# Patient Record
Sex: MALE | Race: WHITE | NOT HISPANIC OR LATINO | Employment: FULL TIME | URBAN - METROPOLITAN AREA
[De-identification: names, ages, dates, MRNs, and addresses within clinical notes are randomized per-mention and may not be internally consistent; named-entity substitution may affect disease eponyms.]

---

## 2017-07-20 ENCOUNTER — CLINICAL SUPPORT (OUTPATIENT)
Dept: OCCUPATIONAL MEDICINE | Facility: CLINIC | Age: 52
End: 2017-07-20

## 2017-07-20 VITALS
OXYGEN SATURATION: 98 % | SYSTOLIC BLOOD PRESSURE: 110 MMHG | DIASTOLIC BLOOD PRESSURE: 74 MMHG | WEIGHT: 265 LBS | HEIGHT: 73 IN | HEART RATE: 52 BPM | BODY MASS INDEX: 35.12 KG/M2 | TEMPERATURE: 98 F

## 2017-07-20 DIAGNOSIS — Z02.89 ENCOUNTER FOR PHYSICAL EXAMINATION RELATED TO EMPLOYMENT: ICD-10-CM

## 2017-07-20 LAB
BILIRUB UR QL STRIP: NORMAL
GLUCOSE UR QL STRIP: NORMAL
KETONES UR QL STRIP: NORMAL
LEUKOCYTE ESTERASE UR QL STRIP: NORMAL
PH, POC UA: NORMAL (ref 5–8)
POC BLOOD, URINE: NORMAL
POC NITRATES, URINE: NORMAL
PROT UR QL STRIP: NORMAL
SP GR UR STRIP: NORMAL (ref 1–1.03)
UROBILINOGEN UR STRIP-ACNC: NORMAL (ref 0.3–2.2)

## 2017-07-20 PROCEDURE — 90714 TD VACC NO PRESV 7 YRS+ IM: CPT | Mod: S$GLB,,,

## 2017-07-20 PROCEDURE — 90691 TYPHOID VACCINE IM: CPT | Mod: S$GLB,,,

## 2017-07-20 PROCEDURE — 99499 UNLISTED E&M SERVICE: CPT | Mod: S$GLB,,, | Performed by: PREVENTIVE MEDICINE

## 2018-02-12 ENCOUNTER — CLINICAL SUPPORT (OUTPATIENT)
Dept: OCCUPATIONAL MEDICINE | Facility: CLINIC | Age: 53
End: 2018-02-12

## 2018-02-12 DIAGNOSIS — Z00.00 PHYSICAL EXAM: Primary | ICD-10-CM

## 2018-02-12 PROCEDURE — 99000 SPECIMEN HANDLING OFFICE-LAB: CPT | Mod: S$GLB,,, | Performed by: PREVENTIVE MEDICINE

## 2018-02-12 PROCEDURE — 99499 UNLISTED E&M SERVICE: CPT | Mod: S$GLB,,, | Performed by: PREVENTIVE MEDICINE

## 2018-02-12 PROCEDURE — 92552 PURE TONE AUDIOMETRY AIR: CPT | Mod: S$GLB,,, | Performed by: PREVENTIVE MEDICINE

## 2018-02-12 PROCEDURE — 99172 OCULAR FUNCTION SCREEN: CPT | Mod: S$GLB,,, | Performed by: PREVENTIVE MEDICINE

## 2018-02-12 PROCEDURE — 36415 COLL VENOUS BLD VENIPUNCTURE: CPT | Mod: S$GLB,,, | Performed by: PREVENTIVE MEDICINE

## 2018-02-12 PROCEDURE — 93000 ELECTROCARDIOGRAM COMPLETE: CPT | Mod: S$GLB,,, | Performed by: PREVENTIVE MEDICINE

## 2018-09-24 ENCOUNTER — CLINICAL SUPPORT (OUTPATIENT)
Dept: URGENT CARE | Facility: CLINIC | Age: 53
End: 2018-09-24

## 2018-09-24 DIAGNOSIS — Z00.00 PHYSICAL EXAM: Primary | ICD-10-CM

## 2018-09-24 PROCEDURE — 99499 UNLISTED E&M SERVICE: CPT | Mod: S$GLB,,, | Performed by: PREVENTIVE MEDICINE

## 2018-09-24 PROCEDURE — 92552 PURE TONE AUDIOMETRY AIR: CPT | Mod: S$GLB,,, | Performed by: PREVENTIVE MEDICINE

## 2021-03-01 LAB
CHOL/HDLC RATIO: 5.2
CHOLEST SERPL-MSCNC: 258 MG/DL (ref 0–200)
HBA1C MFR BLD: 6.2 % (ref 4–6)
HDLC SERPL-MCNC: 50 MG/DL
LDLC SERPL CALC-MCNC: 183 MG/DL
NON-HDL CHOLESTEROL: 208
TRIGLYCERIDE (LIPID PAN): 121

## 2021-04-08 ENCOUNTER — OFFICE VISIT (OUTPATIENT)
Dept: FAMILY MEDICINE | Facility: CLINIC | Age: 56
End: 2021-04-08
Payer: COMMERCIAL

## 2021-04-08 VITALS
HEART RATE: 63 BPM | RESPIRATION RATE: 18 BRPM | WEIGHT: 257.25 LBS | SYSTOLIC BLOOD PRESSURE: 150 MMHG | TEMPERATURE: 97 F | HEIGHT: 73 IN | OXYGEN SATURATION: 98 % | DIASTOLIC BLOOD PRESSURE: 84 MMHG | BODY MASS INDEX: 34.09 KG/M2

## 2021-04-08 DIAGNOSIS — K21.9 GASTROESOPHAGEAL REFLUX DISEASE WITHOUT ESOPHAGITIS: ICD-10-CM

## 2021-04-08 DIAGNOSIS — E78.2 MIXED HYPERLIPIDEMIA: ICD-10-CM

## 2021-04-08 DIAGNOSIS — R06.09 DYSPNEA ON EXERTION: ICD-10-CM

## 2021-04-08 DIAGNOSIS — R55 NEAR SYNCOPE: ICD-10-CM

## 2021-04-08 DIAGNOSIS — E11.9 DIABETES MELLITUS WITHOUT COMPLICATION: Primary | ICD-10-CM

## 2021-04-08 DIAGNOSIS — G47.33 OSA (OBSTRUCTIVE SLEEP APNEA): ICD-10-CM

## 2021-04-08 DIAGNOSIS — R07.9 CHEST PAIN, UNSPECIFIED TYPE: ICD-10-CM

## 2021-04-08 PROCEDURE — 3008F PR BODY MASS INDEX (BMI) DOCUMENTED: ICD-10-PCS | Mod: CPTII,S$GLB,, | Performed by: INTERNAL MEDICINE

## 2021-04-08 PROCEDURE — 1126F AMNT PAIN NOTED NONE PRSNT: CPT | Mod: S$GLB,,, | Performed by: INTERNAL MEDICINE

## 2021-04-08 PROCEDURE — 99204 PR OFFICE/OUTPT VISIT, NEW, LEVL IV, 45-59 MIN: ICD-10-PCS | Mod: S$GLB,,, | Performed by: INTERNAL MEDICINE

## 2021-04-08 PROCEDURE — 3008F BODY MASS INDEX DOCD: CPT | Mod: CPTII,S$GLB,, | Performed by: INTERNAL MEDICINE

## 2021-04-08 PROCEDURE — 99999 PR PBB SHADOW E&M-EST. PATIENT-LVL III: CPT | Mod: PBBFAC,,, | Performed by: INTERNAL MEDICINE

## 2021-04-08 PROCEDURE — 1126F PR PAIN SEVERITY QUANTIFIED, NO PAIN PRESENT: ICD-10-PCS | Mod: S$GLB,,, | Performed by: INTERNAL MEDICINE

## 2021-04-08 PROCEDURE — 99204 OFFICE O/P NEW MOD 45 MIN: CPT | Mod: S$GLB,,, | Performed by: INTERNAL MEDICINE

## 2021-04-08 PROCEDURE — 99999 PR PBB SHADOW E&M-EST. PATIENT-LVL III: ICD-10-PCS | Mod: PBBFAC,,, | Performed by: INTERNAL MEDICINE

## 2021-04-08 RX ORDER — OMEPRAZOLE 40 MG/1
40 CAPSULE, DELAYED RELEASE ORAL DAILY
COMMUNITY
End: 2022-02-21 | Stop reason: ALTCHOICE

## 2021-04-08 RX ORDER — ROSUVASTATIN CALCIUM 20 MG/1
20 TABLET, COATED ORAL DAILY
Qty: 90 TABLET | Refills: 3 | Status: SHIPPED | OUTPATIENT
Start: 2021-04-08 | End: 2022-01-13

## 2021-04-09 ENCOUNTER — PATIENT OUTREACH (OUTPATIENT)
Dept: ADMINISTRATIVE | Facility: HOSPITAL | Age: 56
End: 2021-04-09

## 2021-04-09 ENCOUNTER — PATIENT MESSAGE (OUTPATIENT)
Dept: FAMILY MEDICINE | Facility: CLINIC | Age: 56
End: 2021-04-09

## 2021-04-09 ENCOUNTER — TELEPHONE (OUTPATIENT)
Dept: ADMINISTRATIVE | Facility: HOSPITAL | Age: 56
End: 2021-04-09

## 2021-04-13 ENCOUNTER — HOSPITAL ENCOUNTER (OUTPATIENT)
Dept: CARDIOLOGY | Facility: HOSPITAL | Age: 56
Discharge: HOME | End: 2021-04-13
Attending: INTERNAL MEDICINE
Payer: COMMERCIAL

## 2021-04-13 DIAGNOSIS — R06.09 DYSPNEA ON EXERTION: ICD-10-CM

## 2021-04-13 DIAGNOSIS — R07.9 CHEST PAIN, UNSPECIFIED TYPE: ICD-10-CM

## 2021-04-13 LAB
CV STRESS BASE HR: 68 BPM
DIASTOLIC BLOOD PRESSURE: 96 MMHG
OHS CV CPX 1 MINUTE RECOVERY HEART RATE: 141 BPM
OHS CV CPX 85 PERCENT MAX PREDICTED HEART RATE MALE: 140
OHS CV CPX ESTIMATED METS: 10
OHS CV CPX MAX PREDICTED HEART RATE: 165
OHS CV CPX PATIENT IS FEMALE: 0
OHS CV CPX PATIENT IS MALE: 1
OHS CV CPX PEAK DIASTOLIC BLOOD PRESSURE: 82 MMHG
OHS CV CPX PEAK HEAR RATE: 169 BPM
OHS CV CPX PEAK RATE PRESSURE PRODUCT: NORMAL
OHS CV CPX PEAK SYSTOLIC BLOOD PRESSURE: 235 MMHG
OHS CV CPX PERCENT MAX PREDICTED HEART RATE ACHIEVED: 102
OHS CV CPX RATE PRESSURE PRODUCT PRESENTING: NORMAL
STRESS ECHO POST EXERCISE DUR MIN: 7 MINUTES
STRESS ECHO POST EXERCISE DUR SEC: 58 SECONDS
SYSTOLIC BLOOD PRESSURE: 152 MMHG

## 2021-04-13 PROCEDURE — 93018 CV STRESS TEST I&R ONLY: CPT | Mod: ,,, | Performed by: INTERNAL MEDICINE

## 2021-04-13 PROCEDURE — 93018 EXERCISE STRESS - EKG (CUPID ONLY): ICD-10-PCS | Mod: ,,, | Performed by: INTERNAL MEDICINE

## 2021-04-13 PROCEDURE — 93016 EXERCISE STRESS - EKG (CUPID ONLY): ICD-10-PCS | Mod: ,,, | Performed by: INTERNAL MEDICINE

## 2021-04-13 PROCEDURE — 93017 CV STRESS TEST TRACING ONLY: CPT

## 2021-04-13 PROCEDURE — 93016 CV STRESS TEST SUPVJ ONLY: CPT | Mod: ,,, | Performed by: INTERNAL MEDICINE

## 2021-04-14 ENCOUNTER — TELEPHONE (OUTPATIENT)
Dept: FAMILY MEDICINE | Facility: CLINIC | Age: 56
End: 2021-04-14

## 2021-04-20 ENCOUNTER — TELEPHONE (OUTPATIENT)
Dept: FAMILY MEDICINE | Facility: CLINIC | Age: 56
End: 2021-04-20

## 2021-04-20 DIAGNOSIS — M25.569 KNEE PAIN, UNSPECIFIED CHRONICITY, UNSPECIFIED LATERALITY: Primary | ICD-10-CM

## 2021-04-26 DIAGNOSIS — M25.562 LEFT KNEE PAIN, UNSPECIFIED CHRONICITY: Primary | ICD-10-CM

## 2021-04-26 DIAGNOSIS — M25.561 RIGHT KNEE PAIN, UNSPECIFIED CHRONICITY: ICD-10-CM

## 2021-04-27 ENCOUNTER — OFFICE VISIT (OUTPATIENT)
Dept: ORTHOPEDICS | Facility: CLINIC | Age: 56
End: 2021-04-27
Payer: COMMERCIAL

## 2021-04-27 ENCOUNTER — HOSPITAL ENCOUNTER (OUTPATIENT)
Dept: RADIOLOGY | Facility: HOSPITAL | Age: 56
Discharge: HOME | End: 2021-04-27
Attending: ORTHOPAEDIC SURGERY
Payer: COMMERCIAL

## 2021-04-27 VITALS
SYSTOLIC BLOOD PRESSURE: 173 MMHG | HEIGHT: 73 IN | BODY MASS INDEX: 34.65 KG/M2 | WEIGHT: 261.44 LBS | DIASTOLIC BLOOD PRESSURE: 93 MMHG | HEART RATE: 71 BPM

## 2021-04-27 DIAGNOSIS — M17.11 PRIMARY OSTEOARTHRITIS OF RIGHT KNEE: Primary | ICD-10-CM

## 2021-04-27 DIAGNOSIS — M25.562 LEFT KNEE PAIN, UNSPECIFIED CHRONICITY: ICD-10-CM

## 2021-04-27 DIAGNOSIS — M17.12 PRIMARY OSTEOARTHRITIS OF LEFT KNEE: ICD-10-CM

## 2021-04-27 DIAGNOSIS — M25.569 KNEE PAIN, UNSPECIFIED CHRONICITY, UNSPECIFIED LATERALITY: ICD-10-CM

## 2021-04-27 PROCEDURE — 73564 XR KNEE COMP 4 OR MORE VIEWS BILAT: ICD-10-PCS | Mod: 26,50,, | Performed by: RADIOLOGY

## 2021-04-27 PROCEDURE — 3008F PR BODY MASS INDEX (BMI) DOCUMENTED: ICD-10-PCS | Mod: CPTII,S$GLB,, | Performed by: ORTHOPAEDIC SURGERY

## 2021-04-27 PROCEDURE — 99214 PR OFFICE/OUTPT VISIT, EST, LEVL IV, 30-39 MIN: ICD-10-PCS | Mod: S$GLB,,, | Performed by: ORTHOPAEDIC SURGERY

## 2021-04-27 PROCEDURE — 1125F PR PAIN SEVERITY QUANTIFIED, PAIN PRESENT: ICD-10-PCS | Mod: S$GLB,,, | Performed by: ORTHOPAEDIC SURGERY

## 2021-04-27 PROCEDURE — 73564 X-RAY EXAM KNEE 4 OR MORE: CPT | Mod: TC,50,FY

## 2021-04-27 PROCEDURE — 99214 OFFICE O/P EST MOD 30 MIN: CPT | Mod: S$GLB,,, | Performed by: ORTHOPAEDIC SURGERY

## 2021-04-27 PROCEDURE — 1125F AMNT PAIN NOTED PAIN PRSNT: CPT | Mod: S$GLB,,, | Performed by: ORTHOPAEDIC SURGERY

## 2021-04-27 PROCEDURE — 99999 PR PBB SHADOW E&M-EST. PATIENT-LVL III: ICD-10-PCS | Mod: PBBFAC,,, | Performed by: ORTHOPAEDIC SURGERY

## 2021-04-27 PROCEDURE — 73564 X-RAY EXAM KNEE 4 OR MORE: CPT | Mod: 26,50,, | Performed by: RADIOLOGY

## 2021-04-27 PROCEDURE — 99999 PR PBB SHADOW E&M-EST. PATIENT-LVL III: CPT | Mod: PBBFAC,,, | Performed by: ORTHOPAEDIC SURGERY

## 2021-04-27 PROCEDURE — 3008F BODY MASS INDEX DOCD: CPT | Mod: CPTII,S$GLB,, | Performed by: ORTHOPAEDIC SURGERY

## 2021-04-27 RX ORDER — EZETIMIBE 10 MG/1
TABLET ORAL
COMMUNITY
End: 2022-04-18

## 2021-04-27 RX ORDER — DICLOFENAC SODIUM 75 MG/1
75 TABLET, DELAYED RELEASE ORAL 2 TIMES DAILY
Qty: 28 TABLET | Refills: 0 | Status: SHIPPED | OUTPATIENT
Start: 2021-04-27 | End: 2021-05-11

## 2021-05-05 ENCOUNTER — PATIENT MESSAGE (OUTPATIENT)
Dept: FAMILY MEDICINE | Facility: CLINIC | Age: 56
End: 2021-05-05

## 2021-06-01 DIAGNOSIS — M17.12 PRIMARY OSTEOARTHRITIS OF LEFT KNEE: ICD-10-CM

## 2021-06-01 DIAGNOSIS — M17.11 PRIMARY OSTEOARTHRITIS OF RIGHT KNEE: Primary | ICD-10-CM

## 2021-07-01 ENCOUNTER — TELEPHONE (OUTPATIENT)
Dept: ADMINISTRATIVE | Facility: HOSPITAL | Age: 56
End: 2021-07-01

## 2021-07-01 ENCOUNTER — PATIENT OUTREACH (OUTPATIENT)
Dept: ADMINISTRATIVE | Facility: HOSPITAL | Age: 56
End: 2021-07-01

## 2022-01-07 ENCOUNTER — PATIENT MESSAGE (OUTPATIENT)
Dept: INTERNAL MEDICINE | Facility: CLINIC | Age: 57
End: 2022-01-07
Payer: COMMERCIAL

## 2022-01-07 DIAGNOSIS — Z12.5 PROSTATE CANCER SCREENING: ICD-10-CM

## 2022-01-07 DIAGNOSIS — E11.9 DIABETES MELLITUS WITHOUT COMPLICATION: ICD-10-CM

## 2022-01-07 DIAGNOSIS — E78.2 MIXED HYPERLIPIDEMIA: Primary | ICD-10-CM

## 2022-01-08 ENCOUNTER — PATIENT MESSAGE (OUTPATIENT)
Dept: INTERNAL MEDICINE | Facility: CLINIC | Age: 57
End: 2022-01-08
Payer: COMMERCIAL

## 2022-01-09 ENCOUNTER — PATIENT MESSAGE (OUTPATIENT)
Dept: INTERNAL MEDICINE | Facility: CLINIC | Age: 57
End: 2022-01-09
Payer: COMMERCIAL

## 2022-01-11 ENCOUNTER — LAB VISIT (OUTPATIENT)
Dept: LAB | Facility: HOSPITAL | Age: 57
End: 2022-01-11
Attending: INTERNAL MEDICINE
Payer: COMMERCIAL

## 2022-01-11 DIAGNOSIS — Z12.5 PROSTATE CANCER SCREENING: ICD-10-CM

## 2022-01-11 DIAGNOSIS — E11.9 DIABETES MELLITUS WITHOUT COMPLICATION: ICD-10-CM

## 2022-01-11 DIAGNOSIS — E78.2 MIXED HYPERLIPIDEMIA: ICD-10-CM

## 2022-01-11 LAB
ALBUMIN SERPL BCP-MCNC: 4 G/DL (ref 3.5–5.2)
ALP SERPL-CCNC: 78 U/L (ref 55–135)
ALT SERPL W/O P-5'-P-CCNC: 37 U/L (ref 10–44)
ANION GAP SERPL CALC-SCNC: 8 MMOL/L (ref 8–16)
AST SERPL-CCNC: 40 U/L (ref 10–40)
BILIRUB SERPL-MCNC: 0.3 MG/DL (ref 0.1–1)
BUN SERPL-MCNC: 14 MG/DL (ref 6–20)
CALCIUM SERPL-MCNC: 8.7 MG/DL (ref 8.7–10.5)
CHLORIDE SERPL-SCNC: 102 MMOL/L (ref 95–110)
CHOLEST SERPL-MCNC: 236 MG/DL (ref 120–199)
CHOLEST/HDLC SERPL: 6.4 {RATIO} (ref 2–5)
CO2 SERPL-SCNC: 25 MMOL/L (ref 23–29)
COMPLEXED PSA SERPL-MCNC: 0.24 NG/ML (ref 0–4)
CREAT SERPL-MCNC: 0.8 MG/DL (ref 0.5–1.4)
EST. GFR  (AFRICAN AMERICAN): >60 ML/MIN/1.73 M^2
EST. GFR  (NON AFRICAN AMERICAN): >60 ML/MIN/1.73 M^2
ESTIMATED AVG GLUCOSE: 154 MG/DL (ref 68–131)
GLUCOSE SERPL-MCNC: 202 MG/DL (ref 70–110)
HBA1C MFR BLD: 7 % (ref 4–5.6)
HDLC SERPL-MCNC: 37 MG/DL (ref 40–75)
HDLC SERPL: 15.7 % (ref 20–50)
LDLC SERPL CALC-MCNC: ABNORMAL MG/DL (ref 63–159)
NONHDLC SERPL-MCNC: 199 MG/DL
POTASSIUM SERPL-SCNC: 3.8 MMOL/L (ref 3.5–5.1)
PROT SERPL-MCNC: 7 G/DL (ref 6–8.4)
SODIUM SERPL-SCNC: 135 MMOL/L (ref 136–145)
TRIGL SERPL-MCNC: 438 MG/DL (ref 30–150)

## 2022-01-11 PROCEDURE — 80053 COMPREHEN METABOLIC PANEL: CPT | Performed by: INTERNAL MEDICINE

## 2022-01-11 PROCEDURE — 36415 COLL VENOUS BLD VENIPUNCTURE: CPT | Mod: PO | Performed by: INTERNAL MEDICINE

## 2022-01-11 PROCEDURE — 84153 ASSAY OF PSA TOTAL: CPT | Performed by: INTERNAL MEDICINE

## 2022-01-11 PROCEDURE — 83036 HEMOGLOBIN GLYCOSYLATED A1C: CPT | Performed by: INTERNAL MEDICINE

## 2022-01-11 PROCEDURE — 80061 LIPID PANEL: CPT | Performed by: INTERNAL MEDICINE

## 2022-01-13 ENCOUNTER — PATIENT MESSAGE (OUTPATIENT)
Dept: INTERNAL MEDICINE | Facility: CLINIC | Age: 57
End: 2022-01-13

## 2022-01-13 ENCOUNTER — OFFICE VISIT (OUTPATIENT)
Dept: INTERNAL MEDICINE | Facility: CLINIC | Age: 57
End: 2022-01-13
Payer: COMMERCIAL

## 2022-01-13 VITALS
SYSTOLIC BLOOD PRESSURE: 140 MMHG | TEMPERATURE: 99 F | OXYGEN SATURATION: 98 % | RESPIRATION RATE: 18 BRPM | HEART RATE: 68 BPM | HEIGHT: 73 IN | DIASTOLIC BLOOD PRESSURE: 80 MMHG | BODY MASS INDEX: 36.35 KG/M2 | WEIGHT: 274.25 LBS

## 2022-01-13 DIAGNOSIS — E11.9 DIABETES MELLITUS WITHOUT COMPLICATION: ICD-10-CM

## 2022-01-13 DIAGNOSIS — E78.2 MIXED HYPERLIPIDEMIA: ICD-10-CM

## 2022-01-13 DIAGNOSIS — F33.0 DEPRESSION, MAJOR, RECURRENT, MILD: ICD-10-CM

## 2022-01-13 DIAGNOSIS — Z01.818 PREOP EXAM FOR INTERNAL MEDICINE: Primary | ICD-10-CM

## 2022-01-13 DIAGNOSIS — J34.2 DEVIATED SEPTUM: ICD-10-CM

## 2022-01-13 DIAGNOSIS — R03.0 TRANSIENT ELEVATED BLOOD PRESSURE: ICD-10-CM

## 2022-01-13 PROBLEM — R07.9 CHEST PAIN: Status: RESOLVED | Noted: 2021-04-08 | Resolved: 2022-01-13

## 2022-01-13 PROCEDURE — 93005 ELECTROCARDIOGRAM TRACING: CPT | Mod: S$GLB,,, | Performed by: INTERNAL MEDICINE

## 2022-01-13 PROCEDURE — 3066F NEPHROPATHY DOC TX: CPT | Mod: CPTII,S$GLB,, | Performed by: INTERNAL MEDICINE

## 2022-01-13 PROCEDURE — 3079F DIAST BP 80-89 MM HG: CPT | Mod: CPTII,S$GLB,, | Performed by: INTERNAL MEDICINE

## 2022-01-13 PROCEDURE — 1159F MED LIST DOCD IN RCRD: CPT | Mod: CPTII,S$GLB,, | Performed by: INTERNAL MEDICINE

## 2022-01-13 PROCEDURE — 93005 EKG 12-LEAD: ICD-10-PCS | Mod: S$GLB,,, | Performed by: INTERNAL MEDICINE

## 2022-01-13 PROCEDURE — 3051F PR MOST RECENT HEMOGLOBIN A1C LEVEL 7.0 - < 8.0%: ICD-10-PCS | Mod: CPTII,S$GLB,, | Performed by: INTERNAL MEDICINE

## 2022-01-13 PROCEDURE — 99999 PR PBB SHADOW E&M-EST. PATIENT-LVL III: ICD-10-PCS | Mod: PBBFAC,,, | Performed by: INTERNAL MEDICINE

## 2022-01-13 PROCEDURE — 3061F PR NEG MICROALBUMINURIA RESULT DOCUMENTED/REVIEW: ICD-10-PCS | Mod: CPTII,S$GLB,, | Performed by: INTERNAL MEDICINE

## 2022-01-13 PROCEDURE — 3066F PR DOCUMENTATION OF TREATMENT FOR NEPHROPATHY: ICD-10-PCS | Mod: CPTII,S$GLB,, | Performed by: INTERNAL MEDICINE

## 2022-01-13 PROCEDURE — 1160F PR REVIEW ALL MEDS BY PRESCRIBER/CLIN PHARMACIST DOCUMENTED: ICD-10-PCS | Mod: CPTII,S$GLB,, | Performed by: INTERNAL MEDICINE

## 2022-01-13 PROCEDURE — 99214 PR OFFICE/OUTPT VISIT, EST, LEVL IV, 30-39 MIN: ICD-10-PCS | Mod: S$GLB,,, | Performed by: INTERNAL MEDICINE

## 2022-01-13 PROCEDURE — 1160F RVW MEDS BY RX/DR IN RCRD: CPT | Mod: CPTII,S$GLB,, | Performed by: INTERNAL MEDICINE

## 2022-01-13 PROCEDURE — 3008F BODY MASS INDEX DOCD: CPT | Mod: CPTII,S$GLB,, | Performed by: INTERNAL MEDICINE

## 2022-01-13 PROCEDURE — 3077F SYST BP >= 140 MM HG: CPT | Mod: CPTII,S$GLB,, | Performed by: INTERNAL MEDICINE

## 2022-01-13 PROCEDURE — 3079F PR MOST RECENT DIASTOLIC BLOOD PRESSURE 80-89 MM HG: ICD-10-PCS | Mod: CPTII,S$GLB,, | Performed by: INTERNAL MEDICINE

## 2022-01-13 PROCEDURE — 99999 PR PBB SHADOW E&M-EST. PATIENT-LVL III: CPT | Mod: PBBFAC,,, | Performed by: INTERNAL MEDICINE

## 2022-01-13 PROCEDURE — 3077F PR MOST RECENT SYSTOLIC BLOOD PRESSURE >= 140 MM HG: ICD-10-PCS | Mod: CPTII,S$GLB,, | Performed by: INTERNAL MEDICINE

## 2022-01-13 PROCEDURE — 93010 ELECTROCARDIOGRAM REPORT: CPT | Mod: S$GLB,,, | Performed by: INTERNAL MEDICINE

## 2022-01-13 PROCEDURE — 1159F PR MEDICATION LIST DOCUMENTED IN MEDICAL RECORD: ICD-10-PCS | Mod: CPTII,S$GLB,, | Performed by: INTERNAL MEDICINE

## 2022-01-13 PROCEDURE — 99214 OFFICE O/P EST MOD 30 MIN: CPT | Mod: S$GLB,,, | Performed by: INTERNAL MEDICINE

## 2022-01-13 PROCEDURE — 3051F HG A1C>EQUAL 7.0%<8.0%: CPT | Mod: CPTII,S$GLB,, | Performed by: INTERNAL MEDICINE

## 2022-01-13 PROCEDURE — 93010 EKG 12-LEAD: ICD-10-PCS | Mod: S$GLB,,, | Performed by: INTERNAL MEDICINE

## 2022-01-13 PROCEDURE — 3061F NEG MICROALBUMINURIA REV: CPT | Mod: CPTII,S$GLB,, | Performed by: INTERNAL MEDICINE

## 2022-01-13 PROCEDURE — 3008F PR BODY MASS INDEX (BMI) DOCUMENTED: ICD-10-PCS | Mod: CPTII,S$GLB,, | Performed by: INTERNAL MEDICINE

## 2022-01-13 RX ORDER — BUPROPION HYDROCHLORIDE 150 MG/1
150 TABLET ORAL DAILY
Qty: 30 TABLET | Refills: 1 | Status: SHIPPED | OUTPATIENT
Start: 2022-01-13 | End: 2022-03-10

## 2022-01-13 RX ORDER — ROSUVASTATIN CALCIUM 20 MG/1
20 TABLET, COATED ORAL DAILY
Qty: 90 TABLET | Refills: 3 | Status: SHIPPED | OUTPATIENT
Start: 2022-01-13 | End: 2022-07-25 | Stop reason: SDUPTHER

## 2022-01-13 NOTE — PROGRESS NOTES
[facility] Note    Chief Complaint      Chief Complaint   Patient presents with    Pre-op Exam       History of Present Illness      Ian Cifuentes is a 56 y.o. male who presents today for   Chief Complaint   Patient presents with    Pre-op Exam   .  Patient comes to appointment here fro preop internal medicine visit . He will be getting correction for deviated septum with Dr shelby caceres at [facility] sinus specialist . He has good functional status , can go up a flight of steps and walk for over a block with no chest pain . He is complaining of depression symptoms currently he describes some issues with learning difficulties as a child , lack of focus .     HPI    No problem-specific Assessment & Plan notes found for this encounter.       Problem List Items Addressed This Visit        Psychiatric    Depression, major, recurrent, mild    Overview     Refer to psychology for assessment of possibel learning disability , depression symptoms             ENT    Deviated septum    Overview     For surgical correction with Dr Kamila caceres             Cardiac/Vascular    Transient elevated blood pressure    Overview     ekg             Endocrine    Diabetes mellitus without complication    Overview     Restart janumet 50/500 daily a1c in 3 m               Other    Preop exam for internal medicine - Primary    Overview     Good functional status no cehst pain with walking greater than one block or up a flight stairs   ekg as noted   Cleared for procedure with low risk cardiac complications   Will need to hold janumet day of procedure                 Past Medical History:  Past Medical History:   Diagnosis Date    Acid reflux        Past Surgical History:  Past Surgical History:   Procedure Laterality Date    TONSILLECTOMY         Family History:  family history includes Diabetes in his brother, father, mother, and sister.     Social History:  Social History     Socioeconomic History    Marital  status:    Tobacco Use    Smoking status: Never Smoker    Smokeless tobacco: Never Used   Substance and Sexual Activity    Alcohol use: Yes     Comment: <1    Drug use: No       Review of Systems:   Review of Systems   Constitutional: Negative for fever and weight loss.   HENT: Positive for congestion. Negative for hearing loss and sore throat.    Eyes: Negative for blurred vision.   Respiratory: Negative for cough and shortness of breath.    Cardiovascular: Negative for chest pain, palpitations, claudication and leg swelling.   Gastrointestinal: Negative for abdominal pain, constipation, diarrhea and heartburn.   Genitourinary: Negative for dysuria.   Musculoskeletal: Negative for back pain and myalgias.   Skin: Negative for rash.   Neurological: Negative for tingling, focal weakness and headaches.   Psychiatric/Behavioral: Positive for depression. Negative for suicidal ideas. The patient is not nervous/anxious.         Medications:  Outpatient Encounter Medications as of 1/13/2022   Medication Sig Dispense Refill    ezetimibe (ZETIA) 10 mg tablet Zetia 10 mg tablet   TK 1 T PO QD      omeprazole (PRILOSEC) 40 MG capsule Take 40 mg by mouth once daily.      [DISCONTINUED] SITagliptan-metformin (JANUMET)  mg per tablet Take 1 tablet by mouth once daily. 90 tablet 3    SITagliptan-metformin (JANUMET XR) 50-1,000 mg TM24 Take 1 tablet by mouth before dinner. 30 tablet 5    [DISCONTINUED] ranitidine (ZANTAC) 75 MG tablet Take 75 mg by mouth as needed for Heartburn.      [DISCONTINUED] rosuvastatin (CRESTOR) 20 MG tablet Take 1 tablet (20 mg total) by mouth once daily. (Patient not taking: No sig reported) 90 tablet 3     No facility-administered encounter medications on file as of 1/13/2022.       Allergies:  Review of patient's allergies indicates:  No Known Allergies      Physical Exam      Vitals:    01/13/22 0812   BP: (!) 140/80   Pulse: 68   Resp: 18   Temp: 98.6 °F (37 °C)        Vital  "Signs  Temp: 98.6 °F (37 °C)  Temp src: Oral  Pulse: 68  Resp: 18  SpO2: 98 %  BP: (!) 140/80  BP Location: Right arm  Patient Position: Sitting  Pain Score: 0-No pain  Height and Weight  Height: 6' 1" (185.4 cm)  Weight: 124.4 kg (274 lb 4 oz)  BSA (Calculated - sq m): 2.53 sq meters  BMI (Calculated): 36.2  Weight in (lb) to have BMI = 25: 189.1]     Body mass index is 36.18 kg/m².    Physical Exam  Constitutional:       Appearance: He is well-developed and well-nourished.   HENT:      Head: Normocephalic.   Eyes:      Pupils: Pupils are equal, round, and reactive to light.   Neck:      Thyroid: No thyromegaly.   Cardiovascular:      Rate and Rhythm: Normal rate and regular rhythm.      Heart sounds: No murmur heard.  No friction rub. No gallop.    Pulmonary:      Effort: Pulmonary effort is normal.      Breath sounds: Normal breath sounds.   Abdominal:      General: Bowel sounds are normal.      Palpations: Abdomen is soft.   Musculoskeletal:         General: No edema. Normal range of motion.      Cervical back: Normal range of motion.   Skin:     General: Skin is warm and dry.   Neurological:      Mental Status: He is alert and oriented to person, place, and time.      Sensory: No sensory deficit.   Psychiatric:         Mood and Affect: Mood and affect normal.         Speech: Speech normal.         Behavior: Behavior normal.          Laboratory:  CBC:  No results for input(s): WBC, RBC, HGB, HCT, PLT, MCV, MCH, MCHC in the last 2160 hours.  CMP:  Recent Labs   Lab Result Units 01/11/22  0755   Glucose mg/dL 202*   Calcium mg/dL 8.7   Albumin g/dL 4.0   Total Protein g/dL 7.0   Sodium mmol/L 135*   Potassium mmol/L 3.8   CO2 mmol/L 25   Chloride mmol/L 102   BUN mg/dL 14   Alkaline Phosphatase U/L 78   ALT U/L 37   AST U/L 40   Total Bilirubin mg/dL 0.3     URINALYSIS:  No results for input(s): COLORU, CLARITYU, SPECGRAV, PHUR, PROTEINUA, GLUCOSEU, BILIRUBINCON, BLOODU, WBCU, RBCU, BACTERIA, MUCUS, NITRITE, " LEUKOCYTESUR, UROBILINOGEN, HYALINECASTS in the last 2160 hours.   LIPIDS:  Recent Labs   Lab Result Units 01/11/22  0755   HDL mg/dL 37*   Cholesterol mg/dL 236*   Triglycerides mg/dL 438*   LDL Cholesterol mg/dL Invalid, Trig>400.0   HDL/Cholesterol Ratio % 15.7*   Non-HDL Cholesterol mg/dL 199   Total Cholesterol/HDL Ratio  6.4*     TSH:  No results for input(s): TSH in the last 2160 hours.  A1C:  Recent Labs   Lab Result Units 01/11/22  0755   Hemoglobin A1C % 7.0*       Radiology:        Assessment:     Ian Cifuentes is a 56 y.o.male with:    Preop exam for internal medicine    Deviated septum    Transient elevated blood pressure  -     IN OFFICE EKG 12-LEAD (to Muse)    Diabetes mellitus without complication  -     SITagliptan-metformin (JANUMET XR) 50-1,000 mg TM24; Take 1 tablet by mouth before dinner.  Dispense: 30 tablet; Refill: 5    Depression, major, recurrent, mild  -     Ambulatory referral/consult to Psychology; Future; Expected date: 01/20/2022                Plan:     Problem List Items Addressed This Visit        Psychiatric    Depression, major, recurrent, mild    Overview     Refer to psychology for assessment of possibel learning disability , depression symptoms             ENT    Deviated septum    Overview     For surgical correction with Dr Treviño ent             Cardiac/Vascular    Transient elevated blood pressure    Overview     ekg             Endocrine    Diabetes mellitus without complication    Overview     Restart janumet 50/500 daily a1c in 3 m               Other    Preop exam for internal medicine - Primary    Overview     Good functional status no cehst pain with walking greater than one block or up a flight stairs   ekg as noted   Cleared for procedure with low risk cardiac complications   Will need to hold janumet day of procedure                As above, continue current medications and maintain follow up with specialists.  Return to clinic in 3  months.      Elias DOMÍNGUEZ  [facility] [place]

## 2022-01-26 ENCOUNTER — PATIENT MESSAGE (OUTPATIENT)
Dept: INTERNAL MEDICINE | Facility: CLINIC | Age: 57
End: 2022-01-26
Payer: COMMERCIAL

## 2022-01-26 DIAGNOSIS — R41.840 LACK OF CONCENTRATION: Primary | ICD-10-CM

## 2022-01-27 ENCOUNTER — PATIENT MESSAGE (OUTPATIENT)
Dept: INTERNAL MEDICINE | Facility: CLINIC | Age: 57
End: 2022-01-27
Payer: COMMERCIAL

## 2022-01-27 NOTE — TELEPHONE ENCOUNTER
Yes but he is saying the referral is wrong he is saying he is going for ADHD testing you have Depression on the referral

## 2022-02-20 ENCOUNTER — PATIENT MESSAGE (OUTPATIENT)
Dept: INTERNAL MEDICINE | Facility: CLINIC | Age: 57
End: 2022-02-20
Payer: COMMERCIAL

## 2022-02-20 DIAGNOSIS — K21.9 GASTROESOPHAGEAL REFLUX DISEASE WITHOUT ESOPHAGITIS: Primary | ICD-10-CM

## 2022-02-21 ENCOUNTER — PATIENT MESSAGE (OUTPATIENT)
Dept: INTERNAL MEDICINE | Facility: CLINIC | Age: 57
End: 2022-02-21
Payer: COMMERCIAL

## 2022-02-21 RX ORDER — PANTOPRAZOLE SODIUM 40 MG/1
40 TABLET, DELAYED RELEASE ORAL DAILY
Qty: 30 TABLET | Refills: 5 | Status: SHIPPED | OUTPATIENT
Start: 2022-02-21 | End: 2022-07-25 | Stop reason: SDUPTHER

## 2022-02-22 ENCOUNTER — PATIENT MESSAGE (OUTPATIENT)
Dept: INTERNAL MEDICINE | Facility: CLINIC | Age: 57
End: 2022-02-22
Payer: COMMERCIAL

## 2022-02-22 DIAGNOSIS — F33.0 DEPRESSION, MAJOR, RECURRENT, MILD: Primary | ICD-10-CM

## 2022-02-23 ENCOUNTER — PATIENT MESSAGE (OUTPATIENT)
Dept: INTERNAL MEDICINE | Facility: CLINIC | Age: 57
End: 2022-02-23
Payer: COMMERCIAL

## 2022-02-24 ENCOUNTER — LAB VISIT (OUTPATIENT)
Dept: LAB | Facility: HOSPITAL | Age: 57
End: 2022-02-24
Attending: INTERNAL MEDICINE
Payer: COMMERCIAL

## 2022-02-24 DIAGNOSIS — F33.0 DEPRESSION, MAJOR, RECURRENT, MILD: ICD-10-CM

## 2022-02-24 LAB
T3 SERPL-MCNC: 97 NG/DL (ref 60–180)
T4 FREE SERPL-MCNC: 1 NG/DL (ref 0.71–1.51)
TSH SERPL DL<=0.005 MIU/L-ACNC: 1.53 UIU/ML (ref 0.4–4)

## 2022-02-24 PROCEDURE — 84443 ASSAY THYROID STIM HORMONE: CPT | Performed by: INTERNAL MEDICINE

## 2022-02-24 PROCEDURE — 84439 ASSAY OF FREE THYROXINE: CPT | Performed by: INTERNAL MEDICINE

## 2022-02-24 PROCEDURE — 36415 COLL VENOUS BLD VENIPUNCTURE: CPT | Mod: PO | Performed by: INTERNAL MEDICINE

## 2022-02-24 PROCEDURE — 84480 ASSAY TRIIODOTHYRONINE (T3): CPT | Performed by: INTERNAL MEDICINE

## 2022-02-25 ENCOUNTER — PATIENT MESSAGE (OUTPATIENT)
Dept: INTERNAL MEDICINE | Facility: CLINIC | Age: 57
End: 2022-02-25
Payer: COMMERCIAL

## 2022-03-09 DIAGNOSIS — F33.0 DEPRESSION, MAJOR, RECURRENT, MILD: ICD-10-CM

## 2022-03-09 NOTE — TELEPHONE ENCOUNTER
No new care gaps identified.  Powered by [employer]. Reference number: [id number].   3/09/2022 12:50:21 PM CST

## 2022-03-10 RX ORDER — BUPROPION HYDROCHLORIDE 150 MG/1
TABLET ORAL
Qty: 30 TABLET | Refills: 5 | Status: SHIPPED | OUTPATIENT
Start: 2022-03-10 | End: 2022-07-25 | Stop reason: SDUPTHER

## 2022-04-18 ENCOUNTER — LAB VISIT (OUTPATIENT)
Dept: LAB | Facility: HOSPITAL | Age: 57
End: 2022-04-18
Attending: INTERNAL MEDICINE
Payer: COMMERCIAL

## 2022-04-18 ENCOUNTER — OFFICE VISIT (OUTPATIENT)
Dept: INTERNAL MEDICINE | Facility: CLINIC | Age: 57
End: 2022-04-18
Payer: COMMERCIAL

## 2022-04-18 VITALS
RESPIRATION RATE: 18 BRPM | HEART RATE: 92 BPM | OXYGEN SATURATION: 96 % | HEIGHT: 73 IN | TEMPERATURE: 98 F | SYSTOLIC BLOOD PRESSURE: 122 MMHG | WEIGHT: 264.69 LBS | DIASTOLIC BLOOD PRESSURE: 80 MMHG | BODY MASS INDEX: 35.08 KG/M2

## 2022-04-18 DIAGNOSIS — F33.0 DEPRESSION, MAJOR, RECURRENT, MILD: ICD-10-CM

## 2022-04-18 DIAGNOSIS — E11.9 DIABETES MELLITUS WITHOUT COMPLICATION: ICD-10-CM

## 2022-04-18 DIAGNOSIS — E78.2 MIXED HYPERLIPIDEMIA: ICD-10-CM

## 2022-04-18 DIAGNOSIS — G47.33 OSA (OBSTRUCTIVE SLEEP APNEA): ICD-10-CM

## 2022-04-18 DIAGNOSIS — M94.0 COSTOCHONDRITIS: Primary | ICD-10-CM

## 2022-04-18 LAB
ESTIMATED AVG GLUCOSE: 143 MG/DL (ref 68–131)
HBA1C MFR BLD: 6.6 % (ref 4–5.6)

## 2022-04-18 PROCEDURE — 1160F PR REVIEW ALL MEDS BY PRESCRIBER/CLIN PHARMACIST DOCUMENTED: ICD-10-PCS | Mod: CPTII,S$GLB,, | Performed by: INTERNAL MEDICINE

## 2022-04-18 PROCEDURE — 99214 OFFICE O/P EST MOD 30 MIN: CPT | Mod: S$GLB,,, | Performed by: INTERNAL MEDICINE

## 2022-04-18 PROCEDURE — 99999 PR PBB SHADOW E&M-EST. PATIENT-LVL IV: CPT | Mod: PBBFAC,,, | Performed by: INTERNAL MEDICINE

## 2022-04-18 PROCEDURE — 99214 PR OFFICE/OUTPT VISIT, EST, LEVL IV, 30-39 MIN: ICD-10-PCS | Mod: S$GLB,,, | Performed by: INTERNAL MEDICINE

## 2022-04-18 PROCEDURE — 3066F NEPHROPATHY DOC TX: CPT | Mod: CPTII,S$GLB,, | Performed by: INTERNAL MEDICINE

## 2022-04-18 PROCEDURE — 1159F PR MEDICATION LIST DOCUMENTED IN MEDICAL RECORD: ICD-10-PCS | Mod: CPTII,S$GLB,, | Performed by: INTERNAL MEDICINE

## 2022-04-18 PROCEDURE — 3061F NEG MICROALBUMINURIA REV: CPT | Mod: CPTII,S$GLB,, | Performed by: INTERNAL MEDICINE

## 2022-04-18 PROCEDURE — 99999 PR PBB SHADOW E&M-EST. PATIENT-LVL IV: ICD-10-PCS | Mod: PBBFAC,,, | Performed by: INTERNAL MEDICINE

## 2022-04-18 PROCEDURE — 3061F PR NEG MICROALBUMINURIA RESULT DOCUMENTED/REVIEW: ICD-10-PCS | Mod: CPTII,S$GLB,, | Performed by: INTERNAL MEDICINE

## 2022-04-18 PROCEDURE — 3079F PR MOST RECENT DIASTOLIC BLOOD PRESSURE 80-89 MM HG: ICD-10-PCS | Mod: CPTII,S$GLB,, | Performed by: INTERNAL MEDICINE

## 2022-04-18 PROCEDURE — 1159F MED LIST DOCD IN RCRD: CPT | Mod: CPTII,S$GLB,, | Performed by: INTERNAL MEDICINE

## 2022-04-18 PROCEDURE — 3051F HG A1C>EQUAL 7.0%<8.0%: CPT | Mod: CPTII,S$GLB,, | Performed by: INTERNAL MEDICINE

## 2022-04-18 PROCEDURE — 83036 HEMOGLOBIN GLYCOSYLATED A1C: CPT | Performed by: INTERNAL MEDICINE

## 2022-04-18 PROCEDURE — 3051F PR MOST RECENT HEMOGLOBIN A1C LEVEL 7.0 - < 8.0%: ICD-10-PCS | Mod: CPTII,S$GLB,, | Performed by: INTERNAL MEDICINE

## 2022-04-18 PROCEDURE — 36415 COLL VENOUS BLD VENIPUNCTURE: CPT | Performed by: INTERNAL MEDICINE

## 2022-04-18 PROCEDURE — 1160F RVW MEDS BY RX/DR IN RCRD: CPT | Mod: CPTII,S$GLB,, | Performed by: INTERNAL MEDICINE

## 2022-04-18 PROCEDURE — 3074F PR MOST RECENT SYSTOLIC BLOOD PRESSURE < 130 MM HG: ICD-10-PCS | Mod: CPTII,S$GLB,, | Performed by: INTERNAL MEDICINE

## 2022-04-18 PROCEDURE — 3074F SYST BP LT 130 MM HG: CPT | Mod: CPTII,S$GLB,, | Performed by: INTERNAL MEDICINE

## 2022-04-18 PROCEDURE — 3008F PR BODY MASS INDEX (BMI) DOCUMENTED: ICD-10-PCS | Mod: CPTII,S$GLB,, | Performed by: INTERNAL MEDICINE

## 2022-04-18 PROCEDURE — 3008F BODY MASS INDEX DOCD: CPT | Mod: CPTII,S$GLB,, | Performed by: INTERNAL MEDICINE

## 2022-04-18 PROCEDURE — 3079F DIAST BP 80-89 MM HG: CPT | Mod: CPTII,S$GLB,, | Performed by: INTERNAL MEDICINE

## 2022-04-18 PROCEDURE — 3066F PR DOCUMENTATION OF TREATMENT FOR NEPHROPATHY: ICD-10-PCS | Mod: CPTII,S$GLB,, | Performed by: INTERNAL MEDICINE

## 2022-04-18 RX ORDER — MELOXICAM 15 MG/1
15 TABLET ORAL DAILY
Qty: 30 TABLET | Refills: 0 | Status: SHIPPED | OUTPATIENT
Start: 2022-04-18 | End: 2022-06-11

## 2022-04-18 NOTE — PROGRESS NOTES
[facility] Note    Chief Complaint      Chief Complaint   Patient presents with    Follow-up     3m       History of Present Illness      Ian Cifuentes is a 57 y.o. male who presents today for   Chief Complaint   Patient presents with    Follow-up     3m   .  Patient comes to appointment here for 3 m f/u for chronic issues as below . He has been complaining of a pain in his back just in his ribcage on the left side . He is still seeing psychologist for issues as discussed at last visit workup in progress . He is comlpiant with all m eds , admits he could be better with his diet .     HPI    No problem-specific Assessment & Plan notes found for this encounter.       Problem List Items Addressed This Visit        Psychiatric    Depression, major, recurrent, mild    Overview       Started buproprion 150 mg qam. Is still seeing psychology workup in progress               Cardiac/Vascular    Mixed hyperlipidemia    Overview     Refill crestor cont current               Endocrine    Diabetes mellitus without complication    Overview     Needs a1c , he states in march he had sinus surrgery and needed to take steroids . . bs were veryhigh . He has in the last 2 weeks gotten better but still in the 150 -170 range                 Orthopedic    Costochondritis - Primary    Overview     meloxicam 15 mg po qd disp 30              Other    NEETA (obstructive sleep apnea)    Overview     Has not restarted cpap as he just had ent procedure . Dr kapoor is managing                   Past Medical History:  Past Medical History:   Diagnosis Date    Acid reflux        Past Surgical History:  Past Surgical History:   Procedure Laterality Date    TONSILLECTOMY         Family History:  family history includes Diabetes in his brother, father, mother, and sister.     Social History:  Social History     Socioeconomic History    Marital status:    Tobacco Use    Smoking status: Never Smoker     Smokeless tobacco: Never Used   Substance and Sexual Activity    Alcohol use: Yes     Alcohol/week: 6.0 standard drinks     Types: 6 Cans of beer per week     Comment: <1    Drug use: No    Sexual activity: Not Currently     Partners: Female       Review of Systems:   Review of Systems   Constitutional: Negative for fever and weight loss.   HENT: Negative for congestion, hearing loss and sore throat.    Eyes: Negative for blurred vision.   Respiratory: Negative for cough and shortness of breath.    Cardiovascular: Negative for chest pain, palpitations, claudication and leg swelling.   Gastrointestinal: Negative for abdominal pain, constipation, diarrhea and heartburn.   Genitourinary: Negative for dysuria.   Musculoskeletal: Negative for back pain and myalgias.   Skin: Negative for rash.   Neurological: Negative for focal weakness and headaches.   Psychiatric/Behavioral: Negative for depression and suicidal ideas. The patient is not nervous/anxious.         Medications:  Outpatient Encounter Medications as of 4/18/2022   Medication Sig Dispense Refill    buPROPion (WELLBUTRIN XL) 150 MG TB24 tablet TAKE 1 TABLET(150 MG) BY MOUTH EVERY DAY 30 tablet 5    pantoprazole (PROTONIX) 40 MG tablet Take 1 tablet (40 mg total) by mouth once daily. 30 tablet 5    rosuvastatin (CRESTOR) 20 MG tablet Take 1 tablet (20 mg total) by mouth once daily. 90 tablet 3    SITagliptan-metformin (JANUMET XR) 50-1,000 mg TM24 Take 1 tablet by mouth before dinner. 30 tablet 5    meloxicam (MOBIC) 15 MG tablet Take 1 tablet (15 mg total) by mouth once daily. 30 tablet 0    [DISCONTINUED] ezetimibe (ZETIA) 10 mg tablet Zetia 10 mg tablet   TK 1 T PO QD       No facility-administered encounter medications on file as of 4/18/2022.       Allergies:  Review of patient's allergies indicates:  No Known Allergies      Physical Exam      Vitals:    04/18/22 1610   BP: 122/80   Pulse: 92   Resp: 18   Temp: 97.6 °F (36.4 °C)        Vital  "Signs  Temp: 97.6 °F (36.4 °C)  Temp src: Oral  Pulse: 92  Resp: 18  SpO2: 96 %  BP: 122/80  BP Location: Right arm  Patient Position: Sitting  Pain Score: 0-No pain  Height and Weight  Height: 6' 1" (185.4 cm)  Weight: 120.1 kg (264 lb 10.6 oz)  BSA (Calculated - sq m): 2.49 sq meters  BMI (Calculated): 34.9  Weight in (lb) to have BMI = 25: 189.1]     Body mass index is 34.92 kg/m².    Physical Exam  Constitutional:       Appearance: He is well-developed.   HENT:      Head: Normocephalic.   Eyes:      Pupils: Pupils are equal, round, and reactive to light.   Neck:      Thyroid: No thyromegaly.   Cardiovascular:      Rate and Rhythm: Normal rate and regular rhythm.      Heart sounds: No murmur heard.    No friction rub. No gallop.   Pulmonary:      Effort: Pulmonary effort is normal.      Breath sounds: Normal breath sounds.   Abdominal:      General: Bowel sounds are normal.      Palpations: Abdomen is soft.   Musculoskeletal:         General: Normal range of motion.      Cervical back: Normal range of motion.   Skin:     General: Skin is warm and dry.   Neurological:      Mental Status: He is alert and oriented to person, place, and time.      Sensory: No sensory deficit.   Psychiatric:         Behavior: Behavior normal.          Laboratory:  CBC:  No results for input(s): WBC, RBC, HGB, HCT, PLT, MCV, MCH, MCHC in the last 2160 hours.  CMP:  No results for input(s): GLU, CALCIUM, ALBUMIN, PROT, NA, K, CO2, CL, BUN, ALKPHOS, ALT, AST, BILITOT in the last 2160 hours.    Invalid input(s): CREATININ  URINALYSIS:  No results for input(s): COLORU, CLARITYU, SPECGRAV, PHUR, PROTEINUA, GLUCOSEU, BILIRUBINCON, BLOODU, WBCU, RBCU, BACTERIA, MUCUS, NITRITE, LEUKOCYTESUR, UROBILINOGEN, HYALINECASTS in the last 2160 hours.   LIPIDS:  Recent Labs   Lab Result Units 02/24/22  1606   TSH uIU/mL 1.526     TSH:  Recent Labs   Lab Result Units 02/24/22  1606   TSH uIU/mL 1.526     A1C:  No results for input(s): HGBA1C in the last " 2160 hours.    Radiology:        Assessment:     Ian Cifuentes is a 57 y.o.male with:    Costochondritis  -     meloxicam (MOBIC) 15 MG tablet; Take 1 tablet (15 mg total) by mouth once daily.  Dispense: 30 tablet; Refill: 0    Diabetes mellitus without complication  -     Cancel: Hemoglobin A1C; Future; Expected date: 04/18/2022  -     Hemoglobin A1C; Future; Expected date: 04/18/2022    Mixed hyperlipidemia    NEETA (obstructive sleep apnea)    Depression, major, recurrent, mild                Plan:     Problem List Items Addressed This Visit        Psychiatric    Depression, major, recurrent, mild    Overview       Started buproprion 150 mg qam. Is still seeing psychology workup in progress               Cardiac/Vascular    Mixed hyperlipidemia    Overview     Refill crestor cont current               Endocrine    Diabetes mellitus without complication    Overview     Needs a1c , he states in march he had sinus surrgery and needed to take steroids . . bs were veryhigh . He has in the last 2 weeks gotten better but still in the 150 -170 range                 Orthopedic    Costochondritis - Primary    Overview     meloxicam 15 mg po qd disp 30              Other    NEETA (obstructive sleep apnea)    Overview     Has not restarted cpap as he just had ent procedure . Dr kapoor is managing                  As above, continue current medications and maintain follow up with specialists.  Return to clinic in 3  months.      Frederick W [facility] [place]

## 2022-05-04 ENCOUNTER — PATIENT MESSAGE (OUTPATIENT)
Dept: INTERNAL MEDICINE | Facility: CLINIC | Age: 57
End: 2022-05-04
Payer: COMMERCIAL

## 2022-05-04 DIAGNOSIS — R10.12 LEFT UPPER QUADRANT ABDOMINAL PAIN: Primary | ICD-10-CM

## 2022-05-09 ENCOUNTER — OFFICE VISIT (OUTPATIENT)
Dept: GASTROENTEROLOGY | Facility: CLINIC | Age: 57
End: 2022-05-09
Payer: COMMERCIAL

## 2022-05-09 ENCOUNTER — PATIENT OUTREACH (OUTPATIENT)
Dept: ADMINISTRATIVE | Facility: OTHER | Age: 57
End: 2022-05-09
Payer: COMMERCIAL

## 2022-05-09 VITALS — HEIGHT: 73 IN | BODY MASS INDEX: 34.51 KG/M2 | WEIGHT: 260.38 LBS

## 2022-05-09 DIAGNOSIS — R10.12 LEFT UPPER QUADRANT ABDOMINAL PAIN: ICD-10-CM

## 2022-05-09 DIAGNOSIS — E11.9 DIABETES MELLITUS WITHOUT COMPLICATION: Primary | ICD-10-CM

## 2022-05-09 DIAGNOSIS — R10.9 LEFT SIDED ABDOMINAL PAIN: Primary | ICD-10-CM

## 2022-05-09 DIAGNOSIS — Z12.11 SCREENING FOR COLON CANCER: ICD-10-CM

## 2022-05-09 DIAGNOSIS — K21.00 GASTROESOPHAGEAL REFLUX DISEASE WITH ESOPHAGITIS, UNSPECIFIED WHETHER HEMORRHAGE: ICD-10-CM

## 2022-05-09 PROCEDURE — 3044F PR MOST RECENT HEMOGLOBIN A1C LEVEL <7.0%: ICD-10-PCS | Mod: CPTII,S$GLB,, | Performed by: NURSE PRACTITIONER

## 2022-05-09 PROCEDURE — 1159F PR MEDICATION LIST DOCUMENTED IN MEDICAL RECORD: ICD-10-PCS | Mod: CPTII,S$GLB,, | Performed by: NURSE PRACTITIONER

## 2022-05-09 PROCEDURE — 3008F PR BODY MASS INDEX (BMI) DOCUMENTED: ICD-10-PCS | Mod: CPTII,S$GLB,, | Performed by: NURSE PRACTITIONER

## 2022-05-09 PROCEDURE — 3008F BODY MASS INDEX DOCD: CPT | Mod: CPTII,S$GLB,, | Performed by: NURSE PRACTITIONER

## 2022-05-09 PROCEDURE — 3066F PR DOCUMENTATION OF TREATMENT FOR NEPHROPATHY: ICD-10-PCS | Mod: CPTII,S$GLB,, | Performed by: NURSE PRACTITIONER

## 2022-05-09 PROCEDURE — 99204 OFFICE O/P NEW MOD 45 MIN: CPT | Mod: S$GLB,,, | Performed by: NURSE PRACTITIONER

## 2022-05-09 PROCEDURE — 3066F NEPHROPATHY DOC TX: CPT | Mod: CPTII,S$GLB,, | Performed by: NURSE PRACTITIONER

## 2022-05-09 PROCEDURE — 99204 PR OFFICE/OUTPT VISIT, NEW, LEVL IV, 45-59 MIN: ICD-10-PCS | Mod: S$GLB,,, | Performed by: NURSE PRACTITIONER

## 2022-05-09 PROCEDURE — 1159F MED LIST DOCD IN RCRD: CPT | Mod: CPTII,S$GLB,, | Performed by: NURSE PRACTITIONER

## 2022-05-09 PROCEDURE — 99999 PR PBB SHADOW E&M-EST. PATIENT-LVL IV: CPT | Mod: PBBFAC,,, | Performed by: NURSE PRACTITIONER

## 2022-05-09 PROCEDURE — 3061F PR NEG MICROALBUMINURIA RESULT DOCUMENTED/REVIEW: ICD-10-PCS | Mod: CPTII,S$GLB,, | Performed by: NURSE PRACTITIONER

## 2022-05-09 PROCEDURE — 99999 PR PBB SHADOW E&M-EST. PATIENT-LVL IV: ICD-10-PCS | Mod: PBBFAC,,, | Performed by: NURSE PRACTITIONER

## 2022-05-09 PROCEDURE — 3061F NEG MICROALBUMINURIA REV: CPT | Mod: CPTII,S$GLB,, | Performed by: NURSE PRACTITIONER

## 2022-05-09 PROCEDURE — 3044F HG A1C LEVEL LT 7.0%: CPT | Mod: CPTII,S$GLB,, | Performed by: NURSE PRACTITIONER

## 2022-05-09 RX ORDER — SODIUM, POTASSIUM,MAG SULFATES 17.5-3.13G
1 SOLUTION, RECONSTITUTED, ORAL ORAL DAILY
Qty: 1 KIT | Refills: 0 | Status: SHIPPED | OUTPATIENT
Start: 2022-05-09 | End: 2022-07-25

## 2022-05-09 NOTE — PROGRESS NOTES
GASTROENTEROLOGY CLINIC NOTE    Chief Complaint: The primary encounter diagnosis was Left sided abdominal pain. Diagnoses of Left upper quadrant abdominal pain, Screening for colon cancer, and Gastroesophageal reflux disease with esophagitis, unspecified whether hemorrhage were also pertinent to this visit.  Referring provider/PCP: Elias Garcia MD    HPI:  Ian Cifuentes is a 57 y.o. male who is a new patient to me with a PMH GERD and DM.  He is here today to establish care for abdominal pain, acid reflux, and colon cancer screening.  The abdominal pain is a new problem that has been occurring intermittently over the last year but has become more constant over the last 3-4 months. The pain is primarily located on his left side and fluctuates between dull and stabbing.  It is a constant dull pain and becomes stabbing in nature with coughing, sneezing, having a bowel movement.  It is sometimes aggravated with bending or sitting.  It is not alleviated with having a bowel movement and is not aggravated or alleviated with eating.  He was initiated on Meloxicam for the pain as it was believed to be musculoskeletal in nature.  Patient reports pain worsened after taking meloxicam for two weeks; therefore, he stopped it.  No changes in bowel habits.  No melena, hematochezia, nocturnal symptoms, nausea, or vomiting.  Bowel movements occur daily and are soft in consistency. He does also endorse h/o reflux. Takes Protonix daily which controls his symptoms.     Treatments Tried: Protonix 40mg daily, Meloxicam, ibuprofen  NSAIDs: Meloxicam, ibuprofen  Anticoagulation or Antiplatelet: No    Prior Upper Endoscopy: 2015 No abnormal findings per patient.   Patient does have h/o esophageal ulcer in 2008.    Prior Colonoscopy:  2015 Few benign polyps removed per patient.     Family h/o Colon Cancer: No  Family h/o Crohn's Disease or Ulcerative Colitis: No  Family h/o Celiac Sprue: No  Abdominal Surgeries:  "No    Review of Systems   Constitutional: Negative for weight loss.   HENT: Negative for sore throat.    Eyes: Negative for blurred vision.   Respiratory: Negative for cough.    Cardiovascular: Negative for chest pain.   Gastrointestinal: Positive for abdominal pain. Negative for blood in stool, constipation, diarrhea, heartburn, melena, nausea and vomiting.   Genitourinary: Negative for dysuria.   Musculoskeletal: Negative for myalgias.   Skin: Negative for rash.   Neurological: Negative for headaches.   Endo/Heme/Allergies: Negative for environmental allergies.   Psychiatric/Behavioral: Negative for suicidal ideas. The patient is not nervous/anxious.        Past Medical History: has a past medical history of Acid reflux.    Past Surgical History: has a past surgical history that includes Tonsillectomy.    Family History:family history includes Diabetes in his brother, father, mother, and sister.    Allergies: Review of patient's allergies indicates:  No Known Allergies    Social History: reports that he has never smoked. He has never used smokeless tobacco. He reports current alcohol use of about 6.0 standard drinks of alcohol per week. He reports that he does not use drugs.    Home medications:   Current Outpatient Medications on File Prior to Visit   Medication Sig Dispense Refill    buPROPion (WELLBUTRIN XL) 150 MG TB24 tablet TAKE 1 TABLET(150 MG) BY MOUTH EVERY DAY 30 tablet 5    meloxicam (MOBIC) 15 MG tablet Take 1 tablet (15 mg total) by mouth once daily. 30 tablet 0    pantoprazole (PROTONIX) 40 MG tablet Take 1 tablet (40 mg total) by mouth once daily. 30 tablet 5    rosuvastatin (CRESTOR) 20 MG tablet Take 1 tablet (20 mg total) by mouth once daily. 90 tablet 3    SITagliptan-metformin (JANUMET XR) 50-1,000 mg TM24 Take 1 tablet by mouth before dinner. 30 tablet 5     No current facility-administered medications on file prior to visit.       Vital signs:  Ht 6' 1" (1.854 m)   Wt 118.1 kg (260 lb " 5.8 oz)   BMI 34.35 kg/m²     Physical Exam  Vitals reviewed.   Constitutional:       General: He is not in acute distress.     Appearance: Normal appearance. He is not ill-appearing.   HENT:      Head: Normocephalic.   Cardiovascular:      Rate and Rhythm: Normal rate and regular rhythm.      Heart sounds: Normal heart sounds. No murmur heard.  Pulmonary:      Effort: Pulmonary effort is normal. No respiratory distress.      Breath sounds: Normal breath sounds.   Chest:      Chest wall: No tenderness.   Abdominal:      General: Bowel sounds are normal. There is no distension.      Palpations: Abdomen is soft.      Tenderness: There is no abdominal tenderness. There is no left CVA tenderness. Negative signs include Mcdonald's sign.      Hernia: No hernia is present.          Comments: Pain reproducible with Carnett's Maneuver.    Skin:     General: Skin is warm.   Neurological:      Mental Status: He is alert and oriented to person, place, and time.   Psychiatric:         Mood and Affect: Mood normal.         Behavior: Behavior normal.         Routine labs:  Lab Results   Component Value Date    WBC 6.06 03/31/2008    HGB 16.3 03/31/2008    HCT 47.2 03/31/2008    MCV 89.4 03/31/2008     03/31/2008     No results found for: INR  No results found for: IRON, FERRITIN, TIBC, FESATURATED  Lab Results   Component Value Date     (L) 01/11/2022    K 3.8 01/11/2022     01/11/2022    CO2 25 01/11/2022    BUN 14 01/11/2022    CREATININE 0.8 01/11/2022     Lab Results   Component Value Date    ALBUMIN 4.0 01/11/2022    ALT 37 01/11/2022    AST 40 01/11/2022    ALKPHOS 78 01/11/2022    BILITOT 0.3 01/11/2022     No results found for: GLUCOSE  Lab Results   Component Value Date    TSH 1.526 02/24/2022     Lab Results   Component Value Date    CALCIUM 8.7 01/11/2022       Imaging:      I have reviewed prior labs, imaging, and notes.      Assessment:  1. Left sided abdominal pain    2. Left upper quadrant  abdominal pain    3. Screening for colon cancer    4. Gastroesophageal reflux disease with esophagitis, unspecified whether hemorrhage        Plan:  Orders Placed This Encounter    Ambulatory referral/consult to Pain Clinic    sodium,potassium,mag sulfates (SUPREP BOWEL PREP KIT) 17.5-3.13-1.6 gram SolR    Case Request Endoscopy: COLONOSCOPY, EGD (ESOPHAGOGASTRODUODENOSCOPY)     EGD d/t h/o reflux and patient with recent NSAID use.   Continue Protonix daily as previously prescribed.   Colonoscopy for colon cancer screening. Suprep   Referral to pain management for suspected abdominal wall tenderness.  Tenderness reproduced with Carnett's Maneuver.  Pain increases in intensity with use of abdominal muscles.  He has tried NSAIDs without relief.       Plan of care discussed with patient who is in agreement and verbalized understanding.     I have explained the planned procedures to the patient.The risks, benefits and alternatives of the procedure were also explained in detail. Patient verbalized understanding, all questions were answered. The patient agrees to proceed as planned    Follow Up: As Needed Pending Above Workup          Ca Pope, OPAL,FNP-BC  [facility]

## 2022-05-09 NOTE — PROGRESS NOTES
Health Maintenance Due   Topic Date Due    Hepatitis C Screening  Never done    Foot Exam  Never done    Eye Exam  Never done    HIV Screening  Never done    Shingles Vaccine (1 of 2) Never done    Colorectal Cancer Screening  04/09/2018    COVID-19 Vaccine (3 - Booster for Moderna series) 08/26/2021     Updates were requested from care everywhere.  Chart was reviewed for overdue Proactive [facility] (ROB) topics (CRS, Breast Cancer Screening, Eye exam)  Health Maintenance has been updated.  LINKS immunization registry triggered.  Immunizations were reconciled.  Order placed for diabetic eye screening photo.

## 2022-05-09 NOTE — PATIENT INSTRUCTIONS
SUPREP Instructions    [facility] [address]  Clinic Office [phone]  Endoscopy Lab [phone]    You are scheduled for a Colonoscopy with Dr. Yao on 07/01/2022 at [facility] in [place].    Check in at the Hospital -1st floor, Information desk.   Call [phone] to reschedule.    An adult friend/family member must come with you to drive you home.  You cannot drive, take a taxi, Uber/Lyft or bus to leave the Endoscopy Center alone.  If you do not have someone to drive you home, your test will be cancelled.     Please follow the directions of your doctor if you take any pills that thin your blood. If you take these meds: Aggrenox, Brilinta, Effient, Eliquis, Lovenox, Plavix, Pletal, Pradaxa, Ticilid, Xarelto or Coumadin, let the doctor's office know.    DON'T: On the morning of the test do not take insulin or pills for diabetes.     DO: On the morning of the test, do take any pills for blood pressure, heart, anti-rejection and or seizures with a small sip of water. Bring any inhalers with you.    To have a good prep, you must follow these instructions - please do not use the directions from the pharmacy.    The doctor will send a prescription for the SUPREP.      The Day Before the test:    You can only drink CLEAR LIQUIDS the whole day before your test.  You can't eat any food for the whole day.    You CAN have:  Water, Coffee or decaf coffee (no milk or cream)  Tea  Soft drinks - regular and sugar free  Jell-O (green or yellow)  Apple Juice, grape juice, white cranberry juice  Gatorade, Power Aid, Crystal Light, Govind Aid  Lemonade and Limeade  Bouillon, clear soup  Snowball, popsicles  YOU CAN'T DRINK ANYTHING RED  YOU CAN'T DRINK ALCOHOL  ONLY DRINK WHAT IS ON THE LIST      At 5 pm the night before your test:    Pour the 1st bottle of SUPREP into the cup provided in the box. Add water to the line on the cup and mix well.  Drink the whole cup and then drink 2 more  full cups of water over 1 hour.  This can be easier to drink if it is cold. You can mix it 20 minutes ahead of time and place in the refrigerator before you drink it.  You must drink it within 30-45 minutes of mixing it.  Do NOT pour the drink over ice.  You can drink it with a straw.    The Day of the test - We will call you 2 days before your test to tell you what time to get there.    5 hours before you come to the hospital (this may be in the middle of the night)  Pour the 2nd bottle of SUPREP into the cup provided in the box. Add water to the line on the cup and mix well.  Drink the whole cup and then drink 2 more full cups of water over 1 hour.  It might be easier to drink if it is cold. You can mix it 20 minutes ahead of time and place in the refrigerator before you drink it.  You must drink it within 30-45 minutes of mixing it.  Do NOT pour the drink over ice.  You can drink it with a straw.    YOU CAN'T EAT OR DRINK ANYTHING ELSE ONCE YOU FINISH THE PREP    Leave all valuables and jewelry at home. You will be at the hospital for 2-4 hours.    Call the Endoscopy department at [phone] with any questions about your procedure.

## 2022-05-12 ENCOUNTER — PATIENT MESSAGE (OUTPATIENT)
Dept: INTERNAL MEDICINE | Facility: CLINIC | Age: 57
End: 2022-05-12
Payer: COMMERCIAL

## 2022-05-12 DIAGNOSIS — F90.0 ATTENTION DEFICIT HYPERACTIVITY DISORDER (ADHD), PREDOMINANTLY INATTENTIVE TYPE: Primary | ICD-10-CM

## 2022-05-12 RX ORDER — LISDEXAMFETAMINE DIMESYLATE CAPSULES 20 MG/1
20 CAPSULE ORAL EVERY MORNING
Qty: 30 CAPSULE | Refills: 0 | Status: SHIPPED | OUTPATIENT
Start: 2022-05-12 | End: 2022-06-11

## 2022-06-03 ENCOUNTER — TELEPHONE (OUTPATIENT)
Dept: ADMINISTRATIVE | Facility: OTHER | Age: 57
End: 2022-06-03
Payer: COMMERCIAL

## 2022-06-11 ENCOUNTER — OFFICE VISIT (OUTPATIENT)
Dept: INTERNAL MEDICINE | Facility: CLINIC | Age: 57
End: 2022-06-11
Payer: COMMERCIAL

## 2022-06-11 VITALS
SYSTOLIC BLOOD PRESSURE: 134 MMHG | OXYGEN SATURATION: 97 % | WEIGHT: 257.94 LBS | HEART RATE: 87 BPM | HEIGHT: 73 IN | BODY MASS INDEX: 34.19 KG/M2 | DIASTOLIC BLOOD PRESSURE: 84 MMHG

## 2022-06-11 DIAGNOSIS — F33.0 DEPRESSION, MAJOR, RECURRENT, MILD: ICD-10-CM

## 2022-06-11 DIAGNOSIS — F90.0 ATTENTION DEFICIT HYPERACTIVITY DISORDER (ADHD), PREDOMINANTLY INATTENTIVE TYPE: ICD-10-CM

## 2022-06-11 PROCEDURE — 3075F SYST BP GE 130 - 139MM HG: CPT | Mod: CPTII,S$GLB,, | Performed by: INTERNAL MEDICINE

## 2022-06-11 PROCEDURE — 99214 PR OFFICE/OUTPT VISIT, EST, LEVL IV, 30-39 MIN: ICD-10-PCS | Mod: S$GLB,,, | Performed by: INTERNAL MEDICINE

## 2022-06-11 PROCEDURE — 1160F RVW MEDS BY RX/DR IN RCRD: CPT | Mod: CPTII,S$GLB,, | Performed by: INTERNAL MEDICINE

## 2022-06-11 PROCEDURE — 3066F PR DOCUMENTATION OF TREATMENT FOR NEPHROPATHY: ICD-10-PCS | Mod: CPTII,S$GLB,, | Performed by: INTERNAL MEDICINE

## 2022-06-11 PROCEDURE — 3066F NEPHROPATHY DOC TX: CPT | Mod: CPTII,S$GLB,, | Performed by: INTERNAL MEDICINE

## 2022-06-11 PROCEDURE — 1159F MED LIST DOCD IN RCRD: CPT | Mod: CPTII,S$GLB,, | Performed by: INTERNAL MEDICINE

## 2022-06-11 PROCEDURE — 3061F NEG MICROALBUMINURIA REV: CPT | Mod: CPTII,S$GLB,, | Performed by: INTERNAL MEDICINE

## 2022-06-11 PROCEDURE — 1160F PR REVIEW ALL MEDS BY PRESCRIBER/CLIN PHARMACIST DOCUMENTED: ICD-10-PCS | Mod: CPTII,S$GLB,, | Performed by: INTERNAL MEDICINE

## 2022-06-11 PROCEDURE — 99999 PR PBB SHADOW E&M-EST. PATIENT-LVL III: ICD-10-PCS | Mod: PBBFAC,,, | Performed by: INTERNAL MEDICINE

## 2022-06-11 PROCEDURE — 3079F DIAST BP 80-89 MM HG: CPT | Mod: CPTII,S$GLB,, | Performed by: INTERNAL MEDICINE

## 2022-06-11 PROCEDURE — 1159F PR MEDICATION LIST DOCUMENTED IN MEDICAL RECORD: ICD-10-PCS | Mod: CPTII,S$GLB,, | Performed by: INTERNAL MEDICINE

## 2022-06-11 PROCEDURE — 3061F PR NEG MICROALBUMINURIA RESULT DOCUMENTED/REVIEW: ICD-10-PCS | Mod: CPTII,S$GLB,, | Performed by: INTERNAL MEDICINE

## 2022-06-11 PROCEDURE — 99214 OFFICE O/P EST MOD 30 MIN: CPT | Mod: S$GLB,,, | Performed by: INTERNAL MEDICINE

## 2022-06-11 PROCEDURE — 3008F BODY MASS INDEX DOCD: CPT | Mod: CPTII,S$GLB,, | Performed by: INTERNAL MEDICINE

## 2022-06-11 PROCEDURE — 3008F PR BODY MASS INDEX (BMI) DOCUMENTED: ICD-10-PCS | Mod: CPTII,S$GLB,, | Performed by: INTERNAL MEDICINE

## 2022-06-11 PROCEDURE — 99999 PR PBB SHADOW E&M-EST. PATIENT-LVL III: CPT | Mod: PBBFAC,,, | Performed by: INTERNAL MEDICINE

## 2022-06-11 PROCEDURE — 3044F HG A1C LEVEL LT 7.0%: CPT | Mod: CPTII,S$GLB,, | Performed by: INTERNAL MEDICINE

## 2022-06-11 PROCEDURE — 3075F PR MOST RECENT SYSTOLIC BLOOD PRESS GE 130-139MM HG: ICD-10-PCS | Mod: CPTII,S$GLB,, | Performed by: INTERNAL MEDICINE

## 2022-06-11 PROCEDURE — 3044F PR MOST RECENT HEMOGLOBIN A1C LEVEL <7.0%: ICD-10-PCS | Mod: CPTII,S$GLB,, | Performed by: INTERNAL MEDICINE

## 2022-06-11 PROCEDURE — 3079F PR MOST RECENT DIASTOLIC BLOOD PRESSURE 80-89 MM HG: ICD-10-PCS | Mod: CPTII,S$GLB,, | Performed by: INTERNAL MEDICINE

## 2022-06-11 RX ORDER — LISDEXAMFETAMINE DIMESYLATE 30 MG/1
30 CAPSULE ORAL EVERY MORNING
Qty: 30 CAPSULE | Refills: 0 | Status: SHIPPED | OUTPATIENT
Start: 2022-06-11 | End: 2022-07-05 | Stop reason: SDUPTHER

## 2022-06-11 NOTE — PROGRESS NOTES
[facility] Clinic Note    Chief Complaint      Chief Complaint   Patient presents with    Follow-up     1 MONTH    Hypertension    Medication Refill       History of Present Illness      Ian Cifuentes is a 57 y.o. male who presents today for   Chief Complaint   Patient presents with    Follow-up     1 MONTH    Hypertension    Medication Refill   .  Patient comes to appointment here for f/u for initiating vyvanse . Is working a little but not very much . Will increase to 30 mg . bp well controlled . No meds currently he has complained of a little dizziness with the medication but is getting better     HPI    No problem-specific Assessment & Plan notes found for this encounter.       Problem List Items Addressed This Visit        Psychiatric    Depression, major, recurrent, mild    Overview       Started buproprion 150 mg qam. Is still seeing psychology workup in progress            Attention deficit hyperactivity disorder (ADHD), predominantly inattentive type    Overview     Increase to 30 mg , he will call back in 2 weeks with update on dose adjustment                   Past Medical History:  Past Medical History:   Diagnosis Date    Acid reflux        Past Surgical History:  Past Surgical History:   Procedure Laterality Date    TONSILLECTOMY         Family History:  family history includes Diabetes in his brother, father, mother, and sister.     Social History:  Social History     Socioeconomic History    Marital status:    Tobacco Use    Smoking status: Never Smoker    Smokeless tobacco: Never Used   Substance and Sexual Activity    Alcohol use: Yes     Alcohol/week: 6.0 standard drinks     Types: 6 Cans of beer per week     Comment: <1    Drug use: No    Sexual activity: Not Currently     Partners: Female       Review of Systems:   Review of Systems   HENT: Negative for hearing loss.    Eyes: Negative for discharge.   Respiratory: Negative for wheezing.   Patient saw Deedee Otto 8/22/18 for BV:    \"ASSESSMENT:  1. Bacterial vaginosis    2. Vaginal itching    3. Vaginal bleeding          PLAN:  Metronidazole 500 mg BID x 10 days  She was encouraged to continue vaginal metrogel twice weekly for prophylaxis due to recurrent BV  Will treat for yeast based on clinical symptoms of vaginal itching and thick, white discharge - rx diflucan 150 mg today, repeat dose in 4 days  If spotting and cramping persists after treatment with metronidazole and Diflucan recommend she follow up with Dr. Jimenez  She is agreeable to the above plan of care she will otherwise follow-up as needed with any concerns\"      Patient states she is still having cramping, light irregular spotting, and dryness. States she is still \"a little itchy.\"    Denies any discharge or odor.    She has finished the above POC appropriately.    Dr. Jimenez, please advise. Any other recommendations? Make appointment with you?     "  Cardiovascular: Negative for chest pain and palpitations.   Gastrointestinal: Negative for blood in stool, constipation, diarrhea and vomiting.   Genitourinary: Negative for hematuria and urgency.   Musculoskeletal: Negative for neck pain.   Neurological: Negative for weakness and headaches.   Endo/Heme/Allergies: Negative for polydipsia.        Medications:  Outpatient Encounter Medications as of 6/11/2022   Medication Sig Dispense Refill    buPROPion (WELLBUTRIN XL) 150 MG TB24 tablet TAKE 1 TABLET(150 MG) BY MOUTH EVERY DAY 30 tablet 5    pantoprazole (PROTONIX) 40 MG tablet Take 1 tablet (40 mg total) by mouth once daily. 30 tablet 5    rosuvastatin (CRESTOR) 20 MG tablet Take 1 tablet (20 mg total) by mouth once daily. 90 tablet 3    SITagliptan-metformin (JANUMET XR) 50-1,000 mg TM24 Take 1 tablet by mouth before dinner. 30 tablet 5    sodium,potassium,mag sulfates (SUPREP BOWEL PREP KIT) 17.5-3.13-1.6 gram SolR Take 177 mLs by mouth once daily. 1 kit 0    [DISCONTINUED] lisdexamfetamine (VYVANSE) 20 MG capsule Take 1 capsule (20 mg total) by mouth every morning. 30 capsule 0    lisdexamfetamine (VYVANSE) 30 MG capsule Take 1 capsule (30 mg total) by mouth every morning. 30 capsule 0    [DISCONTINUED] meloxicam (MOBIC) 15 MG tablet Take 1 tablet (15 mg total) by mouth once daily. 30 tablet 0     No facility-administered encounter medications on file as of 6/11/2022.       Allergies:  Review of patient's allergies indicates:  No Known Allergies      Physical Exam      Vitals:    06/11/22 1039   BP: 134/84   Pulse:         Vital Signs  Pulse: 87  SpO2: 97 %  BP: 134/84  BP Location: Right arm  Patient Position: Sitting  Pain Score: 0-No pain  Height and Weight  Height: 6' 1" (185.4 cm)  Weight: 117 kg (257 lb 15 oz)  BSA (Calculated - sq m): 2.45 sq meters  BMI (Calculated): 34  Weight in (lb) to have BMI = 25: 189.1]     Body mass index is 34.03 kg/m².    Physical Exam  Constitutional:       " Appearance: He is well-developed.   HENT:      Head: Normocephalic.   Eyes:      Pupils: Pupils are equal, round, and reactive to light.   Neck:      Thyroid: No thyromegaly.   Cardiovascular:      Rate and Rhythm: Normal rate and regular rhythm.      Heart sounds: No murmur heard.    No friction rub. No gallop.   Pulmonary:      Effort: Pulmonary effort is normal.      Breath sounds: Normal breath sounds.   Abdominal:      General: Bowel sounds are normal.      Palpations: Abdomen is soft.   Musculoskeletal:         General: Normal range of motion.      Cervical back: Normal range of motion.   Skin:     General: Skin is warm and dry.   Neurological:      Mental Status: He is alert and oriented to person, place, and time.      Sensory: No sensory deficit.   Psychiatric:         Behavior: Behavior normal.          Laboratory:  CBC:  No results for input(s): WBC, RBC, HGB, HCT, PLT, MCV, MCH, MCHC in the last 2160 hours.  CMP:  No results for input(s): GLU, CALCIUM, ALBUMIN, PROT, NA, K, CO2, CL, BUN, ALKPHOS, ALT, AST, BILITOT in the last 2160 hours.    Invalid input(s): CREATININ  URINALYSIS:  No results for input(s): COLORU, CLARITYU, SPECGRAV, PHUR, PROTEINUA, GLUCOSEU, BILIRUBINCON, BLOODU, WBCU, RBCU, BACTERIA, MUCUS, NITRITE, LEUKOCYTESUR, UROBILINOGEN, HYALINECASTS in the last 2160 hours.   LIPIDS:  No results for input(s): TSH, HDL, CHOL, TRIG, LDLCALC, CHOLHDL, NONHDLCHOL, TOTALCHOLEST in the last 2160 hours.  TSH:  No results for input(s): TSH in the last 2160 hours.  A1C:  Recent Labs   Lab Result Units 04/18/22  1647   Hemoglobin A1C % 6.6*       Radiology:        Assessment:     Ian Cifuentes is a 57 y.o.male with:    Depression, major, recurrent, mild    Attention deficit hyperactivity disorder (ADHD), predominantly inattentive type  -     lisdexamfetamine (VYVANSE) 30 MG capsule; Take 1 capsule (30 mg total) by mouth every morning.  Dispense: 30 capsule; Refill: 0                Plan:     Problem  List Items Addressed This Visit        Psychiatric    Depression, major, recurrent, mild    Overview       Started buproprion 150 mg qam. Is still seeing psychology workup in progress            Attention deficit hyperactivity disorder (ADHD), predominantly inattentive type    Overview     Increase to 30 mg , he will call back in 2 weeks with update on dose adjustment                  As above, continue current medications and maintain follow up with specialists.  Return to clinic in 3months.      Frederick W Dantagnan [facility] [place]                  Answers for HPI/ROS submitted by the patient on 6/11/2022  activity change: No  unexpected weight change: No  rhinorrhea: No  trouble swallowing: No  visual disturbance: No  chest tightness: No  polyuria: No  difficulty urinating: No  joint swelling: No  arthralgias: No  confusion: No  dysphoric mood: No

## 2022-06-23 ENCOUNTER — TELEPHONE (OUTPATIENT)
Dept: ENDOSCOPY | Facility: HOSPITAL | Age: 57
End: 2022-06-23
Payer: COMMERCIAL

## 2022-06-29 ENCOUNTER — PATIENT MESSAGE (OUTPATIENT)
Dept: INTERNAL MEDICINE | Facility: CLINIC | Age: 57
End: 2022-06-29
Payer: COMMERCIAL

## 2022-07-05 ENCOUNTER — PATIENT MESSAGE (OUTPATIENT)
Dept: INTERNAL MEDICINE | Facility: CLINIC | Age: 57
End: 2022-07-05
Payer: COMMERCIAL

## 2022-07-05 DIAGNOSIS — F90.0 ATTENTION DEFICIT HYPERACTIVITY DISORDER (ADHD), PREDOMINANTLY INATTENTIVE TYPE: ICD-10-CM

## 2022-07-05 RX ORDER — LISDEXAMFETAMINE DIMESYLATE 30 MG/1
30 CAPSULE ORAL EVERY MORNING
Qty: 30 CAPSULE | Refills: 0 | Status: SHIPPED | OUTPATIENT
Start: 2022-07-05 | End: 2022-07-25

## 2022-07-05 NOTE — TELEPHONE ENCOUNTER
Pended refill, can you dill for Dr MEJIA pt please? Last filled 6/11. Pt leaving to go out of town until 7/15, has 6 pills left.

## 2022-07-05 NOTE — TELEPHONE ENCOUNTER
No new care gaps identified.  [facility] Embedded Care Gaps. Reference number: [id number]. 7/05/2022   1:18:40 PM CDT

## 2022-07-15 DIAGNOSIS — E11.9 DIABETES MELLITUS WITHOUT COMPLICATION: ICD-10-CM

## 2022-07-15 RX ORDER — SITAGLIPTIN AND METFORMIN HYDROCHLORIDE 1000; 50 MG/1; MG/1
TABLET, FILM COATED, EXTENDED RELEASE ORAL
Qty: 30 TABLET | Refills: 2 | Status: SHIPPED | OUTPATIENT
Start: 2022-07-15 | End: 2022-07-25 | Stop reason: SDUPTHER

## 2022-07-15 NOTE — TELEPHONE ENCOUNTER
No new care gaps identified.  [facility] Embedded Care Gaps. Reference number: [id number]. 7/15/2022   8:56:45 AM CDT

## 2022-07-25 ENCOUNTER — OFFICE VISIT (OUTPATIENT)
Dept: INTERNAL MEDICINE | Facility: CLINIC | Age: 57
End: 2022-07-25
Payer: COMMERCIAL

## 2022-07-25 VITALS
WEIGHT: 253.75 LBS | HEIGHT: 73 IN | RESPIRATION RATE: 18 BRPM | DIASTOLIC BLOOD PRESSURE: 80 MMHG | HEART RATE: 70 BPM | SYSTOLIC BLOOD PRESSURE: 124 MMHG | BODY MASS INDEX: 33.63 KG/M2 | OXYGEN SATURATION: 98 %

## 2022-07-25 DIAGNOSIS — F90.0 ATTENTION DEFICIT HYPERACTIVITY DISORDER (ADHD), PREDOMINANTLY INATTENTIVE TYPE: ICD-10-CM

## 2022-07-25 DIAGNOSIS — F33.0 DEPRESSION, MAJOR, RECURRENT, MILD: ICD-10-CM

## 2022-07-25 DIAGNOSIS — K21.9 GASTROESOPHAGEAL REFLUX DISEASE WITHOUT ESOPHAGITIS: ICD-10-CM

## 2022-07-25 DIAGNOSIS — E11.9 DIABETES MELLITUS WITHOUT COMPLICATION: ICD-10-CM

## 2022-07-25 DIAGNOSIS — N52.9 ERECTILE DYSFUNCTION, UNSPECIFIED ERECTILE DYSFUNCTION TYPE: Primary | ICD-10-CM

## 2022-07-25 PROCEDURE — 3008F PR BODY MASS INDEX (BMI) DOCUMENTED: ICD-10-PCS | Mod: CPTII,S$GLB,, | Performed by: INTERNAL MEDICINE

## 2022-07-25 PROCEDURE — 3074F SYST BP LT 130 MM HG: CPT | Mod: CPTII,S$GLB,, | Performed by: INTERNAL MEDICINE

## 2022-07-25 PROCEDURE — 99999 PR PBB SHADOW E&M-EST. PATIENT-LVL III: CPT | Mod: PBBFAC,,, | Performed by: INTERNAL MEDICINE

## 2022-07-25 PROCEDURE — 3074F PR MOST RECENT SYSTOLIC BLOOD PRESSURE < 130 MM HG: ICD-10-PCS | Mod: CPTII,S$GLB,, | Performed by: INTERNAL MEDICINE

## 2022-07-25 PROCEDURE — 3008F BODY MASS INDEX DOCD: CPT | Mod: CPTII,S$GLB,, | Performed by: INTERNAL MEDICINE

## 2022-07-25 PROCEDURE — 3061F PR NEG MICROALBUMINURIA RESULT DOCUMENTED/REVIEW: ICD-10-PCS | Mod: CPTII,S$GLB,, | Performed by: INTERNAL MEDICINE

## 2022-07-25 PROCEDURE — 99999 PR PBB SHADOW E&M-EST. PATIENT-LVL III: ICD-10-PCS | Mod: PBBFAC,,, | Performed by: INTERNAL MEDICINE

## 2022-07-25 PROCEDURE — 99214 PR OFFICE/OUTPT VISIT, EST, LEVL IV, 30-39 MIN: ICD-10-PCS | Mod: S$GLB,,, | Performed by: INTERNAL MEDICINE

## 2022-07-25 PROCEDURE — 99214 OFFICE O/P EST MOD 30 MIN: CPT | Mod: S$GLB,,, | Performed by: INTERNAL MEDICINE

## 2022-07-25 PROCEDURE — 3061F NEG MICROALBUMINURIA REV: CPT | Mod: CPTII,S$GLB,, | Performed by: INTERNAL MEDICINE

## 2022-07-25 PROCEDURE — 3079F PR MOST RECENT DIASTOLIC BLOOD PRESSURE 80-89 MM HG: ICD-10-PCS | Mod: CPTII,S$GLB,, | Performed by: INTERNAL MEDICINE

## 2022-07-25 PROCEDURE — 1159F MED LIST DOCD IN RCRD: CPT | Mod: CPTII,S$GLB,, | Performed by: INTERNAL MEDICINE

## 2022-07-25 PROCEDURE — 3044F HG A1C LEVEL LT 7.0%: CPT | Mod: CPTII,S$GLB,, | Performed by: INTERNAL MEDICINE

## 2022-07-25 PROCEDURE — 1159F PR MEDICATION LIST DOCUMENTED IN MEDICAL RECORD: ICD-10-PCS | Mod: CPTII,S$GLB,, | Performed by: INTERNAL MEDICINE

## 2022-07-25 PROCEDURE — 1160F RVW MEDS BY RX/DR IN RCRD: CPT | Mod: CPTII,S$GLB,, | Performed by: INTERNAL MEDICINE

## 2022-07-25 PROCEDURE — 3066F NEPHROPATHY DOC TX: CPT | Mod: CPTII,S$GLB,, | Performed by: INTERNAL MEDICINE

## 2022-07-25 PROCEDURE — 1160F PR REVIEW ALL MEDS BY PRESCRIBER/CLIN PHARMACIST DOCUMENTED: ICD-10-PCS | Mod: CPTII,S$GLB,, | Performed by: INTERNAL MEDICINE

## 2022-07-25 PROCEDURE — 3079F DIAST BP 80-89 MM HG: CPT | Mod: CPTII,S$GLB,, | Performed by: INTERNAL MEDICINE

## 2022-07-25 PROCEDURE — 3066F PR DOCUMENTATION OF TREATMENT FOR NEPHROPATHY: ICD-10-PCS | Mod: CPTII,S$GLB,, | Performed by: INTERNAL MEDICINE

## 2022-07-25 PROCEDURE — 3044F PR MOST RECENT HEMOGLOBIN A1C LEVEL <7.0%: ICD-10-PCS | Mod: CPTII,S$GLB,, | Performed by: INTERNAL MEDICINE

## 2022-07-25 RX ORDER — ROSUVASTATIN CALCIUM 20 MG/1
20 TABLET, COATED ORAL DAILY
Qty: 30 TABLET | Refills: 5 | Status: SHIPPED | OUTPATIENT
Start: 2022-07-25 | End: 2023-02-23

## 2022-07-25 RX ORDER — BUPROPION HYDROCHLORIDE 150 MG/1
150 TABLET ORAL DAILY
Qty: 30 TABLET | Refills: 5 | Status: SHIPPED | OUTPATIENT
Start: 2022-07-25 | End: 2023-02-22

## 2022-07-25 RX ORDER — PANTOPRAZOLE SODIUM 40 MG/1
40 TABLET, DELAYED RELEASE ORAL DAILY
Qty: 30 TABLET | Refills: 5 | Status: SHIPPED | OUTPATIENT
Start: 2022-07-25 | End: 2023-02-22

## 2022-07-25 RX ORDER — SITAGLIPTIN AND METFORMIN HYDROCHLORIDE 1000; 50 MG/1; MG/1
1 TABLET, FILM COATED, EXTENDED RELEASE ORAL
Qty: 30 TABLET | Refills: 5 | Status: SHIPPED | OUTPATIENT
Start: 2022-07-25 | End: 2023-06-07

## 2022-07-25 RX ORDER — SILDENAFIL 100 MG/1
100 TABLET, FILM COATED ORAL DAILY PRN
Qty: 30 TABLET | Refills: 1 | Status: SHIPPED | OUTPATIENT
Start: 2022-07-25 | End: 2023-06-07

## 2022-07-25 NOTE — PROGRESS NOTES
[facility] Note    Chief Complaint      Chief Complaint   Patient presents with    Follow-up       History of Present Illness      Ian Cifuentes is a 57 y.o. male who presents today for   Chief Complaint   Patient presents with    Follow-up   .  Patient comes to appointment here for 3m f/u for add medication management . He was adjusted to 40 mg vyvanse . He feels he is better focused and his ability to remember things has gotten better .     HPI    No problem-specific Assessment & Plan notes found for this encounter.       Problem List Items Addressed This Visit        Psychiatric    Depression, major, recurrent, mild    Overview       Started buproprion 150 mg qam. Is still seeing psychology workup in progress . Is doing better            Attention deficit hyperactivity disorder (ADHD), predominantly inattentive type    Overview     Increased to 30 mg . Cont current regimen  reviewed               Renal/    Erectile dysfunction - Primary    Overview     Will try viagra 100 mg trial .               Endocrine    Diabetes mellitus without complication    Overview     Needs a1c , he states in march he had sinus surrgery and needed to take steroids . . bs were veryhigh . He has in the last 2 weeks gotten better but still in the 150 -170 range                 GI    Gastroesophageal reflux disease without esophagitis    Overview     Cont current regimen                   Past Medical History:  Past Medical History:   Diagnosis Date    Acid reflux        Past Surgical History:  Past Surgical History:   Procedure Laterality Date    TONSILLECTOMY         Family History:  family history includes Diabetes in his brother, father, mother, and sister.     Social History:  Social History     Socioeconomic History    Marital status:    Tobacco Use    Smoking status: Never Smoker    Smokeless tobacco: Never Used   Substance and Sexual Activity    Alcohol use: Yes     Alcohol/week:  6.0 standard drinks     Types: 6 Cans of beer per week     Comment: <1    Drug use: No    Sexual activity: Not Currently     Partners: Female       Review of Systems:   Review of Systems   Constitutional: Negative for fever and weight loss.   HENT: Negative for congestion, hearing loss and sore throat.    Eyes: Negative for blurred vision.   Respiratory: Negative for cough and shortness of breath.    Cardiovascular: Negative for chest pain, palpitations, claudication and leg swelling.   Gastrointestinal: Negative for abdominal pain, constipation, diarrhea and heartburn.   Genitourinary: Negative for dysuria.        Erectile dysfunction    Musculoskeletal: Negative for back pain and myalgias.   Skin: Negative for rash.   Neurological: Negative for focal weakness and headaches.   Psychiatric/Behavioral: Negative for depression and suicidal ideas. The patient is not nervous/anxious.         Medications:  Outpatient Encounter Medications as of 7/25/2022   Medication Sig Dispense Refill    [DISCONTINUED] buPROPion (WELLBUTRIN XL) 150 MG TB24 tablet TAKE 1 TABLET(150 MG) BY MOUTH EVERY DAY 30 tablet 5    [DISCONTINUED] JANUMET XR 50-1,000 mg TM24 TAKE 1 TABLET BY MOUTH BEFORE DINNER 30 tablet 2    [DISCONTINUED] lisdexamfetamine (VYVANSE) 30 MG capsule Take 1 capsule (30 mg total) by mouth every morning. 30 capsule 0    [DISCONTINUED] pantoprazole (PROTONIX) 40 MG tablet Take 1 tablet (40 mg total) by mouth once daily. 30 tablet 5    [DISCONTINUED] rosuvastatin (CRESTOR) 20 MG tablet Take 1 tablet (20 mg total) by mouth once daily. 90 tablet 3    buPROPion (WELLBUTRIN XL) 150 MG TB24 tablet Take 1 tablet (150 mg total) by mouth once daily. 30 tablet 5    pantoprazole (PROTONIX) 40 MG tablet Take 1 tablet (40 mg total) by mouth once daily. 30 tablet 5    rosuvastatin (CRESTOR) 20 MG tablet Take 1 tablet (20 mg total) by mouth once daily. 30 tablet 5    sildenafiL (VIAGRA) 100 MG tablet Take 1 tablet (100 mg  "total) by mouth daily as needed for Erectile Dysfunction. 30 tablet 1    SITagliptan-metformin (JANUMET XR) 50-1,000 mg TM24 Take 1 tablet by mouth before evening meal. 30 tablet 5    [DISCONTINUED] sodium,potassium,mag sulfates (SUPREP BOWEL PREP KIT) 17.5-3.13-1.6 gram SolR Take 177 mLs by mouth once daily. 1 kit 0     No facility-administered encounter medications on file as of 7/25/2022.       Allergies:  Review of patient's allergies indicates:  No Known Allergies      Physical Exam      Vitals:    07/25/22 1606   BP: 124/80   Pulse: 70   Resp: 18        Vital Signs  Pulse: 70  Resp: 18  SpO2: 98 %  BP: 124/80  BP Location: Left arm  Patient Position: Sitting  Pain Score: 0-No pain  Height and Weight  Height: 6' 1" (185.4 cm)  Weight: 115.1 kg (253 lb 12 oz)  BSA (Calculated - sq m): 2.43 sq meters  BMI (Calculated): 33.5  Weight in (lb) to have BMI = 25: 189.1]     Body mass index is 33.48 kg/m².    Physical Exam  Constitutional:       Appearance: He is well-developed.   HENT:      Head: Normocephalic.   Eyes:      Pupils: Pupils are equal, round, and reactive to light.   Neck:      Thyroid: No thyromegaly.   Cardiovascular:      Rate and Rhythm: Normal rate and regular rhythm.      Heart sounds: No murmur heard.    No friction rub. No gallop.   Pulmonary:      Effort: Pulmonary effort is normal.      Breath sounds: Normal breath sounds.   Abdominal:      General: Bowel sounds are normal.      Palpations: Abdomen is soft.   Musculoskeletal:         General: Normal range of motion.      Cervical back: Normal range of motion.   Skin:     General: Skin is warm and dry.   Neurological:      Mental Status: He is alert and oriented to person, place, and time.      Sensory: No sensory deficit.   Psychiatric:         Behavior: Behavior normal.          Laboratory:  CBC:  No results for input(s): WBC, RBC, HGB, HCT, PLT, MCV, MCH, MCHC in the last 2160 hours.  CMP:  No results for input(s): GLU, CALCIUM, ALBUMIN, " PROT, NA, K, CO2, CL, BUN, ALKPHOS, ALT, AST, BILITOT in the last 2160 hours.    Invalid input(s): CREATININ  URINALYSIS:  No results for input(s): COLORU, CLARITYU, SPECGRAV, PHUR, PROTEINUA, GLUCOSEU, BILIRUBINCON, BLOODU, WBCU, RBCU, BACTERIA, MUCUS, NITRITE, LEUKOCYTESUR, UROBILINOGEN, HYALINECASTS in the last 2160 hours.   LIPIDS:  No results for input(s): TSH, HDL, CHOL, TRIG, LDLCALC, CHOLHDL, NONHDLCHOL, TOTALCHOLEST in the last 2160 hours.  TSH:  No results for input(s): TSH in the last 2160 hours.  A1C:  No results for input(s): HGBA1C in the last 2160 hours.    Radiology:        Assessment:     Ian Cifuentes is a 57 y.o.male with:    Erectile dysfunction, unspecified erectile dysfunction type  -     sildenafiL (VIAGRA) 100 MG tablet; Take 1 tablet (100 mg total) by mouth daily as needed for Erectile Dysfunction.  Dispense: 30 tablet; Refill: 1    Attention deficit hyperactivity disorder (ADHD), predominantly inattentive type    Depression, major, recurrent, mild  -     buPROPion (WELLBUTRIN XL) 150 MG TB24 tablet; Take 1 tablet (150 mg total) by mouth once daily.  Dispense: 30 tablet; Refill: 5    Gastroesophageal reflux disease without esophagitis  -     pantoprazole (PROTONIX) 40 MG tablet; Take 1 tablet (40 mg total) by mouth once daily.  Dispense: 30 tablet; Refill: 5    Diabetes mellitus without complication  -     SITagliptan-metformin (JANUMET XR) 50-1,000 mg TM24; Take 1 tablet by mouth before evening meal.  Dispense: 30 tablet; Refill: 5    Other orders  -     rosuvastatin (CRESTOR) 20 MG tablet; Take 1 tablet (20 mg total) by mouth once daily.  Dispense: 30 tablet; Refill: 5                Plan:     Problem List Items Addressed This Visit        Psychiatric    Depression, major, recurrent, mild    Overview       Started buproprion 150 mg qam. Is still seeing psychology workup in progress . Is doing better            Attention deficit hyperactivity disorder (ADHD), predominantly inattentive  type    Overview     Increased to 30 mg . Cont current regimen  reviewed               Renal/    Erectile dysfunction - Primary    Overview     Will try viagra 100 mg trial .               Endocrine    Diabetes mellitus without complication    Overview     Needs a1c , he states in march he had sinus surrgery and needed to take steroids . . bs were veryhigh . He has in the last 2 weeks gotten better but still in the 150 -170 range                 GI    Gastroesophageal reflux disease without esophagitis    Overview     Cont current regimen                  As above, continue current medications and maintain follow up with specialists.  Return to clinic in 6 months.      Frederick W Dantagnan [facility] [place]

## 2022-08-12 ENCOUNTER — PATIENT MESSAGE (OUTPATIENT)
Dept: INTERNAL MEDICINE | Facility: CLINIC | Age: 57
End: 2022-08-12
Payer: COMMERCIAL

## 2022-08-12 RX ORDER — LISDEXAMFETAMINE DIMESYLATE 40 MG/1
40 CAPSULE ORAL DAILY
Qty: 30 CAPSULE | Refills: 0 | Status: SHIPPED | OUTPATIENT
Start: 2022-08-12 | End: 2022-09-14

## 2022-08-24 ENCOUNTER — PATIENT MESSAGE (OUTPATIENT)
Dept: ADMINISTRATIVE | Facility: HOSPITAL | Age: 57
End: 2022-08-24
Payer: COMMERCIAL

## 2022-09-13 NOTE — TELEPHONE ENCOUNTER
Care Due:                  Date            Visit Type   Department     Provider  --------------------------------------------------------------------------------                                EP -                              PRIMARY      [facility] PRIMARY  Last Visit: 07-      CARE ([facility])   CARE           Elias Garcia                              [facility] PRIMARY  Next Visit: 10-      CARE ([facility])   [facility]           Elias Garcia                                                            Last  Test          Frequency    Reason                     Performed    Due Date  --------------------------------------------------------------------------------    HBA1C.......  6 months...  SITagliptan-metformin....  04-   10-    [facility] Embedded Care Gaps. Reference number: [id number]. 9/13/2022   5:25:47 PM CDT

## 2022-09-14 RX ORDER — LISDEXAMFETAMINE DIMESYLATE 40 MG/1
CAPSULE ORAL
Qty: 30 CAPSULE | Refills: 0 | Status: SHIPPED | OUTPATIENT
Start: 2022-09-14 | End: 2022-10-14

## 2022-11-07 ENCOUNTER — PATIENT MESSAGE (OUTPATIENT)
Dept: ADMINISTRATIVE | Facility: HOSPITAL | Age: 57
End: 2022-11-07
Payer: COMMERCIAL

## 2022-11-07 ENCOUNTER — PATIENT OUTREACH (OUTPATIENT)
Dept: ADMINISTRATIVE | Facility: HOSPITAL | Age: 57
End: 2022-11-07
Payer: COMMERCIAL

## 2022-12-14 DIAGNOSIS — E11.9 TYPE 2 DIABETES MELLITUS WITHOUT COMPLICATION: ICD-10-CM

## 2022-12-14 RX ORDER — LISDEXAMFETAMINE DIMESYLATE 40 MG/1
CAPSULE ORAL
Qty: 30 CAPSULE | Refills: 0 | Status: SHIPPED | OUTPATIENT
Start: 2022-12-14 | End: 2023-01-17

## 2022-12-14 NOTE — TELEPHONE ENCOUNTER
Care Due:                  Date            Visit Type   Department     Provider  --------------------------------------------------------------------------------                                EP -                              PRIMARY      [facility] PRIMARY  Last Visit: 07-      CARE ([facility])   CARE           Elias Garcia  Next Visit: None Scheduled  None         None Found                                                            Last  Test          Frequency    Reason                     Performed    Due Date  --------------------------------------------------------------------------------    CMP.........  12 months..  SITagliptan-metformin,     01- 01-                             rosuvastatin.............    HBA1C.......  6 months...  SITagliptan-metformin....  04-   10-    Lipid Panel.  12 months..  rosuvastatin.............  01- 01-    [facility] Embedded Care Gaps. Reference number: [id number]. 12/14/2022   10:06:11 AM CST

## 2022-12-19 ENCOUNTER — PATIENT MESSAGE (OUTPATIENT)
Dept: ADMINISTRATIVE | Facility: HOSPITAL | Age: 57
End: 2022-12-19
Payer: COMMERCIAL

## 2023-01-16 ENCOUNTER — PATIENT MESSAGE (OUTPATIENT)
Dept: ADMINISTRATIVE | Facility: HOSPITAL | Age: 58
End: 2023-01-16
Payer: COMMERCIAL

## 2023-01-16 NOTE — TELEPHONE ENCOUNTER
No new care gaps identified.  [facility] Embedded Care Gaps. Reference number: [id number]. 1/16/2023   10:05:03 AM CST

## 2023-01-17 RX ORDER — LISDEXAMFETAMINE DIMESYLATE 40 MG/1
CAPSULE ORAL
Qty: 30 CAPSULE | Refills: 0 | Status: SHIPPED | OUTPATIENT
Start: 2023-01-17 | End: 2023-02-23

## 2023-01-22 ENCOUNTER — PATIENT MESSAGE (OUTPATIENT)
Dept: INTERNAL MEDICINE | Facility: CLINIC | Age: 58
End: 2023-01-22
Payer: COMMERCIAL

## 2023-02-01 DIAGNOSIS — E11.9 DIABETES MELLITUS WITHOUT COMPLICATION: ICD-10-CM

## 2023-02-01 DIAGNOSIS — E11.9 TYPE 2 DIABETES MELLITUS WITHOUT COMPLICATION: ICD-10-CM

## 2023-02-22 DIAGNOSIS — K21.9 GASTROESOPHAGEAL REFLUX DISEASE WITHOUT ESOPHAGITIS: ICD-10-CM

## 2023-02-22 DIAGNOSIS — F33.0 DEPRESSION, MAJOR, RECURRENT, MILD: ICD-10-CM

## 2023-02-22 RX ORDER — BUPROPION HYDROCHLORIDE 150 MG/1
TABLET ORAL
Qty: 90 TABLET | Refills: 1 | Status: SHIPPED | OUTPATIENT
Start: 2023-02-22 | End: 2023-06-07

## 2023-02-22 RX ORDER — PANTOPRAZOLE SODIUM 40 MG/1
TABLET, DELAYED RELEASE ORAL
Qty: 90 TABLET | Refills: 1 | Status: SHIPPED | OUTPATIENT
Start: 2023-02-22 | End: 2023-09-29

## 2023-02-23 RX ORDER — LISDEXAMFETAMINE DIMESYLATE 40 MG/1
CAPSULE ORAL
Qty: 30 CAPSULE | Refills: 0 | Status: SHIPPED | OUTPATIENT
Start: 2023-02-23 | End: 2023-06-07

## 2023-02-23 RX ORDER — ROSUVASTATIN CALCIUM 20 MG/1
TABLET, COATED ORAL
Qty: 90 TABLET | Refills: 3 | Status: SHIPPED | OUTPATIENT
Start: 2023-02-23 | End: 2023-06-07

## 2023-02-23 NOTE — TELEPHONE ENCOUNTER
Refill Routing Note   Medication(s) are not appropriate for processing by [facility] for the following reason(s):         Medication outside of protocol  Required labs outdated    [facility] action(s):  Defer  Route  Approve         Appointments  past 12m or future 3m with PCP    Date Provider   Last Visit   7/25/2022 Elias Garcia MD   Next Visit   Visit date not found Elias Garcia MD   ED visits in past 90 days: 0        Note composed:8:29 PM 02/22/2023

## 2023-02-23 NOTE — TELEPHONE ENCOUNTER
Care Due:                  Date            Visit Type   Department     Provider  --------------------------------------------------------------------------------                                EP -                              PRIMARY      [facility] PRIMARY  Last Visit: 07-      CARE ([facility])   CARE           Elias Garcia  Next Visit: None Scheduled  None         None Found                                                            Last  Test          Frequency    Reason                     Performed    Due Date  --------------------------------------------------------------------------------    CMP.........  12 months..  SITagliptan-metformin,     01- 01-                             rosuvastatin.............    HBA1C.......  6 months...  SITagliptan-metformin....  04-   10-    Lipid Panel.  12 months..  rosuvastatin.............  01- 01-    [facility] Embedded Care Gaps. Reference number: [id number]. 2/22/2023   8:28:49 PM CST

## 2023-03-01 ENCOUNTER — PATIENT MESSAGE (OUTPATIENT)
Dept: PRIMARY CARE CLINIC | Facility: CLINIC | Age: 58
End: 2023-03-01
Payer: COMMERCIAL

## 2023-04-03 ENCOUNTER — PATIENT MESSAGE (OUTPATIENT)
Dept: ADMINISTRATIVE | Facility: HOSPITAL | Age: 58
End: 2023-04-03
Payer: COMMERCIAL

## 2023-04-06 DIAGNOSIS — E11.9 TYPE 2 DIABETES MELLITUS WITHOUT COMPLICATION: ICD-10-CM

## 2023-04-13 ENCOUNTER — PATIENT MESSAGE (OUTPATIENT)
Dept: ADMINISTRATIVE | Facility: HOSPITAL | Age: 58
End: 2023-04-13
Payer: COMMERCIAL

## 2023-04-17 ENCOUNTER — PATIENT MESSAGE (OUTPATIENT)
Dept: ADMINISTRATIVE | Facility: HOSPITAL | Age: 58
End: 2023-04-17
Payer: COMMERCIAL

## 2023-04-21 ENCOUNTER — PATIENT MESSAGE (OUTPATIENT)
Dept: ADMINISTRATIVE | Facility: HOSPITAL | Age: 58
End: 2023-04-21
Payer: COMMERCIAL

## 2023-05-31 ENCOUNTER — TELEPHONE (OUTPATIENT)
Dept: PRIMARY CARE CLINIC | Facility: CLINIC | Age: 58
End: 2023-05-31
Payer: COMMERCIAL

## 2023-05-31 NOTE — TELEPHONE ENCOUNTER
Called patient to schedule alc ,patient has labs in the system that was never done . Was unable to leave vm

## 2023-06-07 ENCOUNTER — CLINICAL SUPPORT (OUTPATIENT)
Dept: PRIMARY CARE CLINIC | Facility: CLINIC | Age: 58
End: 2023-06-07
Attending: INTERNAL MEDICINE
Payer: COMMERCIAL

## 2023-06-07 ENCOUNTER — OFFICE VISIT (OUTPATIENT)
Dept: PRIMARY CARE CLINIC | Facility: CLINIC | Age: 58
End: 2023-06-07
Payer: COMMERCIAL

## 2023-06-07 ENCOUNTER — PATIENT MESSAGE (OUTPATIENT)
Dept: INTERNAL MEDICINE | Facility: CLINIC | Age: 58
End: 2023-06-07
Payer: COMMERCIAL

## 2023-06-07 VITALS
SYSTOLIC BLOOD PRESSURE: 140 MMHG | HEIGHT: 73 IN | RESPIRATION RATE: 18 BRPM | DIASTOLIC BLOOD PRESSURE: 80 MMHG | TEMPERATURE: 97 F | HEART RATE: 79 BPM | BODY MASS INDEX: 34.53 KG/M2 | OXYGEN SATURATION: 97 % | WEIGHT: 260.56 LBS

## 2023-06-07 DIAGNOSIS — E78.2 MIXED HYPERLIPIDEMIA: ICD-10-CM

## 2023-06-07 DIAGNOSIS — F33.0 DEPRESSION, MAJOR, RECURRENT, MILD: ICD-10-CM

## 2023-06-07 DIAGNOSIS — Z12.12 SCREENING FOR COLORECTAL CANCER: ICD-10-CM

## 2023-06-07 DIAGNOSIS — Z12.11 SCREENING FOR COLORECTAL CANCER: ICD-10-CM

## 2023-06-07 DIAGNOSIS — E11.9 DIABETES MELLITUS WITHOUT COMPLICATION: ICD-10-CM

## 2023-06-07 DIAGNOSIS — Z11.4 SCREENING FOR HIV (HUMAN IMMUNODEFICIENCY VIRUS): ICD-10-CM

## 2023-06-07 DIAGNOSIS — M79.10 MYALGIA: Primary | ICD-10-CM

## 2023-06-07 DIAGNOSIS — Z11.59 NEED FOR HEPATITIS C SCREENING TEST: ICD-10-CM

## 2023-06-07 PROCEDURE — 3008F PR BODY MASS INDEX (BMI) DOCUMENTED: ICD-10-PCS | Mod: CPTII,S$GLB,, | Performed by: INTERNAL MEDICINE

## 2023-06-07 PROCEDURE — 3077F SYST BP >= 140 MM HG: CPT | Mod: CPTII,S$GLB,, | Performed by: INTERNAL MEDICINE

## 2023-06-07 PROCEDURE — 1159F PR MEDICATION LIST DOCUMENTED IN MEDICAL RECORD: ICD-10-PCS | Mod: CPTII,S$GLB,, | Performed by: INTERNAL MEDICINE

## 2023-06-07 PROCEDURE — 99999 PR PBB SHADOW E&M-EST. PATIENT-LVL IV: CPT | Mod: PBBFAC,,, | Performed by: INTERNAL MEDICINE

## 2023-06-07 PROCEDURE — 3079F DIAST BP 80-89 MM HG: CPT | Mod: CPTII,S$GLB,, | Performed by: INTERNAL MEDICINE

## 2023-06-07 PROCEDURE — 3008F BODY MASS INDEX DOCD: CPT | Mod: CPTII,S$GLB,, | Performed by: INTERNAL MEDICINE

## 2023-06-07 PROCEDURE — 92228 IMG RTA DETC/MNTR DS PHY/QHP: CPT | Mod: TC,S$GLB,, | Performed by: INTERNAL MEDICINE

## 2023-06-07 PROCEDURE — 1159F MED LIST DOCD IN RCRD: CPT | Mod: CPTII,S$GLB,, | Performed by: INTERNAL MEDICINE

## 2023-06-07 PROCEDURE — 92228 IMG RTA DETC/MNTR DS PHY/QHP: CPT | Mod: 26,S$GLB,, | Performed by: OPTOMETRIST

## 2023-06-07 PROCEDURE — 92228 DIABETIC EYE SCREENING PHOTO: ICD-10-PCS | Mod: TC,S$GLB,, | Performed by: INTERNAL MEDICINE

## 2023-06-07 PROCEDURE — 99214 PR OFFICE/OUTPT VISIT, EST, LEVL IV, 30-39 MIN: ICD-10-PCS | Mod: S$GLB,,, | Performed by: INTERNAL MEDICINE

## 2023-06-07 PROCEDURE — 92228 DIABETIC EYE SCREENING PHOTO: ICD-10-PCS | Mod: 26,S$GLB,, | Performed by: OPTOMETRIST

## 2023-06-07 PROCEDURE — 3077F PR MOST RECENT SYSTOLIC BLOOD PRESSURE >= 140 MM HG: ICD-10-PCS | Mod: CPTII,S$GLB,, | Performed by: INTERNAL MEDICINE

## 2023-06-07 PROCEDURE — 3079F PR MOST RECENT DIASTOLIC BLOOD PRESSURE 80-89 MM HG: ICD-10-PCS | Mod: CPTII,S$GLB,, | Performed by: INTERNAL MEDICINE

## 2023-06-07 PROCEDURE — 99214 OFFICE O/P EST MOD 30 MIN: CPT | Mod: S$GLB,,, | Performed by: INTERNAL MEDICINE

## 2023-06-07 PROCEDURE — 99999 PR PBB SHADOW E&M-EST. PATIENT-LVL IV: ICD-10-PCS | Mod: PBBFAC,,, | Performed by: INTERNAL MEDICINE

## 2023-06-07 RX ORDER — ROSUVASTATIN CALCIUM 20 MG/1
20 TABLET, COATED ORAL DAILY
Qty: 90 TABLET | Refills: 3 | Status: SHIPPED | OUTPATIENT
Start: 2023-06-07 | End: 2024-03-26

## 2023-06-07 RX ORDER — METFORMIN HYDROCHLORIDE 500 MG/1
500 TABLET, EXTENDED RELEASE ORAL
Qty: 90 TABLET | Refills: 3 | Status: SHIPPED | OUTPATIENT
Start: 2023-06-07 | End: 2023-09-22 | Stop reason: SDUPTHER

## 2023-06-07 RX ORDER — DULOXETIN HYDROCHLORIDE 60 MG/1
60 CAPSULE, DELAYED RELEASE ORAL DAILY
Qty: 30 CAPSULE | Refills: 11 | Status: SHIPPED | OUTPATIENT
Start: 2023-06-07 | End: 2023-09-22

## 2023-06-07 NOTE — PROGRESS NOTES
HPI:  Ian Cifuentes is a 58 y.o. year old male that  presents with   Chief Complaint   Patient presents with    Muscle Pain     X 2 years     Anxiety   .       Past Medical History:   Diagnosis Date    Acid reflux      Social History     Socioeconomic History    Marital status:    Tobacco Use    Smoking status: Never    Smokeless tobacco: Never   Substance and Sexual Activity    Alcohol use: Yes     Alcohol/week: 6.0 standard drinks     Types: 6 Cans of beer per week     Comment: <1    Drug use: No    Sexual activity: Not Currently     Partners: Female     Past Surgical History:   Procedure Laterality Date    TONSILLECTOMY       Family History   Problem Relation Age of Onset    Diabetes Mother     Diabetes Father     Diabetes Sister     Diabetes Brother            Pt with 2 years of having diffuse muscle pains.  Originally noted in forearms while driving.  Has progressed to bilat arms and legs and back and chest.  Noted that he has pain simply from holding the newspaper to read it.  Reports having pain in am.  Stopped his meds with no change at all.  Notes cramping with repetitive movements.  Feels like he  ran a 10K in the AM.  Has tried high doses of OTC meds (advil, etc) with no relief.  Has progressively worsened over time.  Mild weakness in his opinion.  He does have a hx of lower back disc issues and cervical disc issues.  He does report occ numbness in 4th and 5th digits on his left hand occasionally.      Health Maintenance Topics with due status: Overdue       Topic Date Due    Hepatitis C Screening Never done    Pneumococcal Vaccines (Age 0-64) Never done    Eye Exam Never done    HIV Screening Never done    Shingles Vaccine Never done    Colorectal Cancer Screening 04/09/2018    COVID-19 Vaccine 05/21/2021    Hemoglobin A1c 10/18/2022    Diabetes Urine Screening 01/11/2023    Lipid Panel 01/11/2023     Health Maintenance Topics with due status: Due Soon       Topic Date Due    Foot Exam  "07/25/2023       Review of Systems  Review of Systems   Constitutional:  Negative for chills, fever and weight loss.   Respiratory:  Negative for cough and shortness of breath.    Cardiovascular:  Negative for claudication and leg swelling.   Gastrointestinal:  Negative for abdominal pain.   Musculoskeletal:  Positive for myalgias. Negative for falls and joint pain.   Skin:  Negative for rash.   Neurological:  Negative for dizziness, tingling, focal weakness and headaches.       Physical Exam:  BP (!) 140/80 (BP Location: Right arm, Patient Position: Sitting, BP Method: Large (Manual))   Pulse 79   Temp 97 °F (36.1 °C) (Oral)   Resp 18   Ht 6' 1" (1.854 m)   Wt 118.2 kg (260 lb 9.3 oz)   SpO2 97%   BMI 34.38 kg/m²   Physical Exam  Constitutional:       Appearance: Normal appearance.   Musculoskeletal:         General: No swelling, tenderness, deformity or signs of injury. Normal range of motion.      Right lower leg: No edema.      Left lower leg: No edema.   Skin:     Coloration: Skin is not jaundiced.      Findings: No bruising or rash.   Neurological:      General: No focal deficit present.      Mental Status: He is alert.      Cranial Nerves: No cranial nerve deficit.      Sensory: No sensory deficit.      Gait: Gait normal.         LABS:    No results found for this or any previous visit (from the past 2016 hour(s)).    Imaging:        Assessment:    ICD-10-CM ICD-9-CM    1. Myalgia  M79.10 729.1 BORA Screen w/Reflex      CBC Auto Differential      CK      TSH      Rheumatoid Factor      Sedimentation rate      2. Depression, major, recurrent, mild  F33.0 296.31 DULoxetine (CYMBALTA) 60 MG capsule      3. Mixed hyperlipidemia  E78.2 272.2 rosuvastatin (CRESTOR) 20 MG tablet      4. Diabetes mellitus without complication  E11.9 250.00 metFORMIN (GLUCOPHAGE-XR) 500 MG ER 24hr tablet      Diabetic Eye Screening Photo      5. Need for hepatitis C screening test  Z11.59 V73.89 Hepatitis C Antibody      6. " Screening for colorectal cancer  Z12.11 V76.51 Cologuard Screening (Multitarget Stool DNA)    Z12.12 V76.41 Cologuard Screening (Multitarget Stool DNA)      7. Screening for HIV (human immunodeficiency virus)  Z11.4 V73.89 HIV 1/2 Ag/Ab (4th Gen)        The primary encounter diagnosis was Myalgia. Diagnoses of Depression, major, recurrent, mild, Mixed hyperlipidemia, Diabetes mellitus without complication, Need for hepatitis C screening test, Screening for colorectal cancer, and Screening for HIV (human immunodeficiency virus) were also pertinent to this visit.      Plan:  Orders Placed This Encounter   Procedures    Cologuard Screening (Multitarget Stool DNA)    BORA Screen w/Reflex    CBC Auto Differential    CK    Hepatitis C Antibody    TSH    Rheumatoid Factor    Sedimentation rate    HIV 1/2 Ag/Ab (4th Gen)    Diabetic Eye Screening Photo       Because no change off statin jeana restart crestor.  Pt had issues with afforsing meds, will change from Janumet to Metformin .  Pt stated that the wellbutrin was not sufficient - will change to cymbalta.  Pt off vyyvanse and does not want to start anything at this time.  Will get pt caught up on screenings as well as lab workup for possible causes of myalgias, potentially may need MRI, but will start with labs.    Tomy Younger MD

## 2023-06-07 NOTE — PROGRESS NOTES
Ian Cifuentes is a 58 y.o. male here for a diabetic eye screening with non-dilated fundus photos per Dr Younger .    Patient cooperative?: Yes  Small pupils?: No  Last eye exam: NA    For exam results, see Encounter Report.

## 2023-06-08 ENCOUNTER — LAB VISIT (OUTPATIENT)
Dept: LAB | Facility: HOSPITAL | Age: 58
End: 2023-06-08
Attending: INTERNAL MEDICINE
Payer: COMMERCIAL

## 2023-06-08 DIAGNOSIS — M79.10 MYALGIA: ICD-10-CM

## 2023-06-08 DIAGNOSIS — Z11.4 SCREENING FOR HIV (HUMAN IMMUNODEFICIENCY VIRUS): ICD-10-CM

## 2023-06-08 DIAGNOSIS — E11.9 DIABETES MELLITUS WITHOUT COMPLICATION: ICD-10-CM

## 2023-06-08 DIAGNOSIS — E11.9 TYPE 2 DIABETES MELLITUS WITHOUT COMPLICATION: ICD-10-CM

## 2023-06-08 DIAGNOSIS — Z11.59 NEED FOR HEPATITIS C SCREENING TEST: ICD-10-CM

## 2023-06-08 LAB
ALBUMIN SERPL BCP-MCNC: 4 G/DL (ref 3.5–5.2)
ALP SERPL-CCNC: 82 U/L (ref 55–135)
ALT SERPL W/O P-5'-P-CCNC: 33 U/L (ref 10–44)
ANION GAP SERPL CALC-SCNC: 9 MMOL/L (ref 8–16)
AST SERPL-CCNC: 24 U/L (ref 10–40)
BASOPHILS # BLD AUTO: 0.05 K/UL (ref 0–0.2)
BASOPHILS NFR BLD: 0.9 % (ref 0–1.9)
BILIRUB SERPL-MCNC: 0.7 MG/DL (ref 0.1–1)
BUN SERPL-MCNC: 17 MG/DL (ref 6–20)
CALCIUM SERPL-MCNC: 9.1 MG/DL (ref 8.7–10.5)
CHLORIDE SERPL-SCNC: 102 MMOL/L (ref 95–110)
CHOLEST SERPL-MCNC: 259 MG/DL (ref 120–199)
CHOLEST/HDLC SERPL: 6.3 {RATIO} (ref 2–5)
CK SERPL-CCNC: 157 U/L (ref 20–200)
CO2 SERPL-SCNC: 25 MMOL/L (ref 23–29)
CREAT SERPL-MCNC: 1 MG/DL (ref 0.5–1.4)
DIFFERENTIAL METHOD: ABNORMAL
EOSINOPHIL # BLD AUTO: 0.4 K/UL (ref 0–0.5)
EOSINOPHIL NFR BLD: 6.6 % (ref 0–8)
ERYTHROCYTE [DISTWIDTH] IN BLOOD BY AUTOMATED COUNT: 12.9 % (ref 11.5–14.5)
ERYTHROCYTE [SEDIMENTATION RATE] IN BLOOD BY PHOTOMETRIC METHOD: 7 MM/HR (ref 0–23)
EST. GFR  (NO RACE VARIABLE): >60 ML/MIN/1.73 M^2
ESTIMATED AVG GLUCOSE: 197 MG/DL (ref 68–131)
GLUCOSE SERPL-MCNC: 247 MG/DL (ref 70–110)
HBA1C MFR BLD: 8.5 % (ref 4–5.6)
HCT VFR BLD AUTO: 47 % (ref 40–54)
HCV AB SERPL QL IA: NORMAL
HDLC SERPL-MCNC: 41 MG/DL (ref 40–75)
HDLC SERPL: 15.8 % (ref 20–50)
HGB BLD-MCNC: 16.1 G/DL (ref 14–18)
HIV 1+2 AB+HIV1 P24 AG SERPL QL IA: NORMAL
IMM GRANULOCYTES # BLD AUTO: 0.02 K/UL (ref 0–0.04)
IMM GRANULOCYTES NFR BLD AUTO: 0.4 % (ref 0–0.5)
LDLC SERPL CALC-MCNC: 185.2 MG/DL (ref 63–159)
LYMPHOCYTES # BLD AUTO: 2.1 K/UL (ref 1–4.8)
LYMPHOCYTES NFR BLD: 38.2 % (ref 18–48)
MCH RBC QN AUTO: 31.8 PG (ref 27–31)
MCHC RBC AUTO-ENTMCNC: 34.3 G/DL (ref 32–36)
MCV RBC AUTO: 93 FL (ref 82–98)
MONOCYTES # BLD AUTO: 0.6 K/UL (ref 0.3–1)
MONOCYTES NFR BLD: 10.4 % (ref 4–15)
NEUTROPHILS # BLD AUTO: 2.4 K/UL (ref 1.8–7.7)
NEUTROPHILS NFR BLD: 43.5 % (ref 38–73)
NONHDLC SERPL-MCNC: 218 MG/DL
NRBC BLD-RTO: 0 /100 WBC
PLATELET # BLD AUTO: 186 K/UL (ref 150–450)
PMV BLD AUTO: 10.6 FL (ref 9.2–12.9)
POTASSIUM SERPL-SCNC: 4.7 MMOL/L (ref 3.5–5.1)
PROT SERPL-MCNC: 6.9 G/DL (ref 6–8.4)
RBC # BLD AUTO: 5.06 M/UL (ref 4.6–6.2)
RHEUMATOID FACT SERPL-ACNC: <13 IU/ML (ref 0–15)
SODIUM SERPL-SCNC: 136 MMOL/L (ref 136–145)
TRIGL SERPL-MCNC: 164 MG/DL (ref 30–150)
TSH SERPL DL<=0.005 MIU/L-ACNC: 1.63 UIU/ML (ref 0.4–4)
WBC # BLD AUTO: 5.57 K/UL (ref 3.9–12.7)

## 2023-06-08 PROCEDURE — 86803 HEPATITIS C AB TEST: CPT | Performed by: INTERNAL MEDICINE

## 2023-06-08 PROCEDURE — 86038 ANTINUCLEAR ANTIBODIES: CPT | Performed by: INTERNAL MEDICINE

## 2023-06-08 PROCEDURE — 85025 COMPLETE CBC W/AUTO DIFF WBC: CPT | Performed by: INTERNAL MEDICINE

## 2023-06-08 PROCEDURE — 86235 NUCLEAR ANTIGEN ANTIBODY: CPT | Mod: 59 | Performed by: INTERNAL MEDICINE

## 2023-06-08 PROCEDURE — 86225 DNA ANTIBODY NATIVE: CPT | Performed by: INTERNAL MEDICINE

## 2023-06-08 PROCEDURE — 84443 ASSAY THYROID STIM HORMONE: CPT | Performed by: INTERNAL MEDICINE

## 2023-06-08 PROCEDURE — 86039 ANTINUCLEAR ANTIBODIES (ANA): CPT | Performed by: INTERNAL MEDICINE

## 2023-06-08 PROCEDURE — 87389 HIV-1 AG W/HIV-1&-2 AB AG IA: CPT | Performed by: INTERNAL MEDICINE

## 2023-06-08 PROCEDURE — 83036 HEMOGLOBIN GLYCOSYLATED A1C: CPT | Performed by: INTERNAL MEDICINE

## 2023-06-08 PROCEDURE — 86431 RHEUMATOID FACTOR QUANT: CPT | Performed by: INTERNAL MEDICINE

## 2023-06-08 PROCEDURE — 80053 COMPREHEN METABOLIC PANEL: CPT | Performed by: INTERNAL MEDICINE

## 2023-06-08 PROCEDURE — 85652 RBC SED RATE AUTOMATED: CPT | Performed by: INTERNAL MEDICINE

## 2023-06-08 PROCEDURE — 80061 LIPID PANEL: CPT | Performed by: INTERNAL MEDICINE

## 2023-06-08 PROCEDURE — 82550 ASSAY OF CK (CPK): CPT | Performed by: INTERNAL MEDICINE

## 2023-06-08 PROCEDURE — 36415 COLL VENOUS BLD VENIPUNCTURE: CPT | Performed by: INTERNAL MEDICINE

## 2023-06-09 LAB
ANA PATTERN 1: NORMAL
ANA SER QL IF: POSITIVE
ANA TITR SER IF: NORMAL {TITER}

## 2023-06-12 DIAGNOSIS — R76.8 ELEVATED ANTINUCLEAR ANTIBODY (ANA) LEVEL: Primary | ICD-10-CM

## 2023-06-12 DIAGNOSIS — M25.50 POLYARTHRALGIA: ICD-10-CM

## 2023-06-12 NOTE — PROGRESS NOTES
Lasb ok but cholesterol is elevated and hhis blood sugar is very high . Is he taking the crestor and metformin ?

## 2023-06-13 LAB
ANTI SM ANTIBODY: 0.09 RATIO (ref 0–0.99)
ANTI SM/RNP ANTIBODY: 0.12 RATIO (ref 0–0.99)
ANTI-SM INTERPRETATION: NEGATIVE
ANTI-SM/RNP INTERPRETATION: NEGATIVE
ANTI-SSA ANTIBODY: 0.07 RATIO (ref 0–0.99)
ANTI-SSA INTERPRETATION: NEGATIVE
ANTI-SSB ANTIBODY: 0.07 RATIO (ref 0–0.99)
ANTI-SSB INTERPRETATION: NEGATIVE
DSDNA AB SER-ACNC: NORMAL [IU]/ML

## 2023-06-20 ENCOUNTER — OFFICE VISIT (OUTPATIENT)
Dept: RHEUMATOLOGY | Facility: CLINIC | Age: 58
End: 2023-06-20
Payer: COMMERCIAL

## 2023-06-20 ENCOUNTER — HOSPITAL ENCOUNTER (OUTPATIENT)
Dept: RADIOLOGY | Facility: HOSPITAL | Age: 58
Discharge: HOME | End: 2023-06-20
Attending: INTERNAL MEDICINE
Payer: COMMERCIAL

## 2023-06-20 VITALS
HEART RATE: 73 BPM | HEIGHT: 73 IN | DIASTOLIC BLOOD PRESSURE: 89 MMHG | SYSTOLIC BLOOD PRESSURE: 138 MMHG | WEIGHT: 260.81 LBS | BODY MASS INDEX: 34.57 KG/M2

## 2023-06-20 DIAGNOSIS — M25.50 POLYARTHRALGIA: Primary | ICD-10-CM

## 2023-06-20 DIAGNOSIS — R20.2 PARESTHESIA: Primary | ICD-10-CM

## 2023-06-20 DIAGNOSIS — M25.50 POLYARTHRALGIA: ICD-10-CM

## 2023-06-20 DIAGNOSIS — M54.12 CERVICAL RADICULOPATHY: ICD-10-CM

## 2023-06-20 DIAGNOSIS — M54.16 LUMBAR RADICULOPATHY: ICD-10-CM

## 2023-06-20 DIAGNOSIS — R76.8 ELEVATED ANTINUCLEAR ANTIBODY (ANA) LEVEL: ICD-10-CM

## 2023-06-20 PROCEDURE — 3079F DIAST BP 80-89 MM HG: CPT | Mod: CPTII,S$GLB,, | Performed by: INTERNAL MEDICINE

## 2023-06-20 PROCEDURE — 3052F HG A1C>EQUAL 8.0%<EQUAL 9.0%: CPT | Mod: CPTII,S$GLB,, | Performed by: INTERNAL MEDICINE

## 2023-06-20 PROCEDURE — 3052F PR MOST RECENT HEMOGLOBIN A1C LEVEL 8.0 - < 9.0%: ICD-10-PCS | Mod: CPTII,S$GLB,, | Performed by: INTERNAL MEDICINE

## 2023-06-20 PROCEDURE — 3008F BODY MASS INDEX DOCD: CPT | Mod: CPTII,S$GLB,, | Performed by: INTERNAL MEDICINE

## 2023-06-20 PROCEDURE — 99999 PR PBB SHADOW E&M-EST. PATIENT-LVL IV: ICD-10-PCS | Mod: PBBFAC,,, | Performed by: INTERNAL MEDICINE

## 2023-06-20 PROCEDURE — 3075F SYST BP GE 130 - 139MM HG: CPT | Mod: CPTII,S$GLB,, | Performed by: INTERNAL MEDICINE

## 2023-06-20 PROCEDURE — 77077 JOINT SURVEY SINGLE VIEW: CPT | Mod: TC

## 2023-06-20 PROCEDURE — 73030 X-RAY EXAM OF SHOULDER: CPT | Mod: 26,50,, | Performed by: RADIOLOGY

## 2023-06-20 PROCEDURE — 3008F PR BODY MASS INDEX (BMI) DOCUMENTED: ICD-10-PCS | Mod: CPTII,S$GLB,, | Performed by: INTERNAL MEDICINE

## 2023-06-20 PROCEDURE — 77077 JOINT SURVEY SINGLE VIEW: CPT | Mod: 26,,, | Performed by: RADIOLOGY

## 2023-06-20 PROCEDURE — 99204 OFFICE O/P NEW MOD 45 MIN: CPT | Mod: S$GLB,,, | Performed by: INTERNAL MEDICINE

## 2023-06-20 PROCEDURE — 3075F PR MOST RECENT SYSTOLIC BLOOD PRESS GE 130-139MM HG: ICD-10-PCS | Mod: CPTII,S$GLB,, | Performed by: INTERNAL MEDICINE

## 2023-06-20 PROCEDURE — 73030 XR SHOULDER COMPLETE 2 OR MORE VIEWS BILATERAL: ICD-10-PCS | Mod: 26,50,, | Performed by: RADIOLOGY

## 2023-06-20 PROCEDURE — 1159F PR MEDICATION LIST DOCUMENTED IN MEDICAL RECORD: ICD-10-PCS | Mod: CPTII,S$GLB,, | Performed by: INTERNAL MEDICINE

## 2023-06-20 PROCEDURE — 1159F MED LIST DOCD IN RCRD: CPT | Mod: CPTII,S$GLB,, | Performed by: INTERNAL MEDICINE

## 2023-06-20 PROCEDURE — 99204 PR OFFICE/OUTPT VISIT, NEW, LEVL IV, 45-59 MIN: ICD-10-PCS | Mod: S$GLB,,, | Performed by: INTERNAL MEDICINE

## 2023-06-20 PROCEDURE — 73030 X-RAY EXAM OF SHOULDER: CPT | Mod: TC,50

## 2023-06-20 PROCEDURE — 77077 XR ARTHRITIS SURVEY: ICD-10-PCS | Mod: 26,,, | Performed by: RADIOLOGY

## 2023-06-20 PROCEDURE — 99999 PR PBB SHADOW E&M-EST. PATIENT-LVL IV: CPT | Mod: PBBFAC,,, | Performed by: INTERNAL MEDICINE

## 2023-06-20 PROCEDURE — 3079F PR MOST RECENT DIASTOLIC BLOOD PRESSURE 80-89 MM HG: ICD-10-PCS | Mod: CPTII,S$GLB,, | Performed by: INTERNAL MEDICINE

## 2023-06-20 RX ORDER — SITAGLIPTIN AND METFORMIN HYDROCHLORIDE 50; 500 MG/1; MG/1
TABLET, FILM COATED, EXTENDED RELEASE ORAL
COMMUNITY
Start: 2021-05-06 | End: 2023-09-22

## 2023-06-20 NOTE — PROGRESS NOTES
"Subjective:      Patient ID: Ian Cifuentes is a 58 y.o. male.    Chief Complaint: Disease Management    HPI  58 year old male with PMh of NEETA, depression, ADHD,  type II DM, HL,  GERD, cervical DJD, lumbar djd,  here for evaluation. He started to have muscle pain 4 years ago, worse in last 2 years.   Sometimes, he feels weak in his legs. He does not feel weakness in upper extremity.  Pain starts in forearm and goes to his shoulders.  Reports pain is sometimes burning. Denies joint swelling. Denies any rashes. He has good appetite. Denies night sweats or fevers.   He is currently on Crestor. He stopped it for 5 months and no improvement in the muscles. He used to be on lipitor due to muscle aches. He snores but not severely.  He gets headaches. He feels fatigued.     Patient Active Problem List   Diagnosis    Diabetes mellitus without complication    Mixed hyperlipidemia    Gastroesophageal reflux disease without esophagitis    NEETA (obstructive sleep apnea)    Near syncope    Dyspnea on exertion    Primary osteoarthritis of left knee    Primary osteoarthritis of right knee    Deviated septum    Preop exam for internal medicine    Transient elevated blood pressure    Depression, major, recurrent, mild    Costochondritis    Attention deficit hyperactivity disorder (ADHD), predominantly inattentive type    Erectile dysfunction         Past Medical History:   Diagnosis Date    Acid reflux        Review of Systems see HPI      Objective:   /89   Pulse 73   Ht 6' 1" (1.854 m)   Wt 118.3 kg (260 lb 12.9 oz)   BMI 34.41 kg/m²   Physical Exam   Constitutional: He is oriented to person, place, and time. normal appearance.   HENT:   Head: Normocephalic and atraumatic.   Right Ear: Tympanic membrane normal.   Left Ear: Tympanic membrane normal.   Nose: Nose normal. No rhinorrhea or nasal congestion.   Mouth/Throat: No oropharyngeal exudate or posterior oropharyngeal erythema. Oropharynx is clear.   Eyes: Pupils " are equal, round, and reactive to light. Conjunctivae are normal. Right eye exhibits no discharge. Left eye exhibits no discharge. No scleral icterus.   Cardiovascular: Regular rhythm. Exam reveals no gallop and no friction rub.   No murmur heard.  Pulmonary/Chest: Effort normal and breath sounds normal. No stridor. No respiratory distress. He has no wheezes.   Abdominal: Soft. Bowel sounds are normal. He exhibits no distension and no mass. There is no abdominal tenderness.   Musculoskeletal:         General: No swelling or deformity. Normal range of motion.   Neurological: He is alert and oriented to person, place, and time.   Skin: Skin is warm. No bruising noted. No jaundice or pallor.    5/5 strength in upper and lower extremity    No data to display     Assessment:   58 year old male with PMh of NEETA, depression, ADHD,  type II DM, HL,  GERD, cervical DJD, lumbar djd,  here for evaluation of myalgias.  He has +BORA but no clinical features of SLE. He has normal strength so low suspicion for myositis.    1. Elevated antinuclear antibody (BORA) level    2. Polyarthralgia          Plan:     Problem List Items Addressed This Visit    None  Visit Diagnoses       Elevated antinuclear antibody (BORA) level        Polyarthralgia              Labs  Xrays    Consider PMR referral pending results    #obesity: encourage weight loss  Discussed anti-inflammatory diet  45 * minutes of total time spent on the encounter, which includes face to face time and non-face to face time preparing to see the patient (eg, review of tests), Obtaining and/or reviewing separately obtained history, Documenting clinical information in the electronic or other health record, Independently interpreting results (not separately reported) and communicating results to the patient/family/caregiver, or Care coordination (not separately reported).

## 2023-06-22 ENCOUNTER — HOSPITAL ENCOUNTER (OUTPATIENT)
Dept: RADIOLOGY | Facility: HOSPITAL | Age: 58
Discharge: HOME | End: 2023-06-22
Attending: INTERNAL MEDICINE
Payer: COMMERCIAL

## 2023-06-22 DIAGNOSIS — M25.50 POLYARTHRALGIA: ICD-10-CM

## 2023-06-22 PROCEDURE — 72100 XR LUMBAR SPINE AP AND LATERAL: ICD-10-PCS | Mod: 26,,, | Performed by: RADIOLOGY

## 2023-06-22 PROCEDURE — 72100 X-RAY EXAM L-S SPINE 2/3 VWS: CPT | Mod: 26,,, | Performed by: RADIOLOGY

## 2023-06-22 PROCEDURE — 72100 X-RAY EXAM L-S SPINE 2/3 VWS: CPT | Mod: TC

## 2023-06-29 LAB — NONINV COLON CA DNA+OCC BLD SCRN STL QL: NEGATIVE

## 2023-07-05 ENCOUNTER — PATIENT MESSAGE (OUTPATIENT)
Dept: RHEUMATOLOGY | Facility: CLINIC | Age: 58
End: 2023-07-05
Payer: COMMERCIAL

## 2023-08-03 ENCOUNTER — OFFICE VISIT (OUTPATIENT)
Dept: NEUROLOGY | Facility: CLINIC | Age: 58
End: 2023-08-03
Payer: COMMERCIAL

## 2023-08-03 VITALS
SYSTOLIC BLOOD PRESSURE: 130 MMHG | DIASTOLIC BLOOD PRESSURE: 86 MMHG | BODY MASS INDEX: 33.84 KG/M2 | HEART RATE: 71 BPM | HEIGHT: 73 IN | WEIGHT: 255.31 LBS

## 2023-08-03 DIAGNOSIS — G72.9 MYOPATHY: Primary | ICD-10-CM

## 2023-08-03 DIAGNOSIS — R20.2 PARESTHESIA: ICD-10-CM

## 2023-08-03 PROCEDURE — 99204 PR OFFICE/OUTPT VISIT, NEW, LEVL IV, 45-59 MIN: ICD-10-PCS | Mod: S$GLB,,, | Performed by: PSYCHIATRY & NEUROLOGY

## 2023-08-03 PROCEDURE — 3075F SYST BP GE 130 - 139MM HG: CPT | Mod: CPTII,S$GLB,, | Performed by: PSYCHIATRY & NEUROLOGY

## 2023-08-03 PROCEDURE — 3079F DIAST BP 80-89 MM HG: CPT | Mod: CPTII,S$GLB,, | Performed by: PSYCHIATRY & NEUROLOGY

## 2023-08-03 PROCEDURE — 99204 OFFICE O/P NEW MOD 45 MIN: CPT | Mod: S$GLB,,, | Performed by: PSYCHIATRY & NEUROLOGY

## 2023-08-03 PROCEDURE — 3052F HG A1C>EQUAL 8.0%<EQUAL 9.0%: CPT | Mod: CPTII,S$GLB,, | Performed by: PSYCHIATRY & NEUROLOGY

## 2023-08-03 PROCEDURE — 3008F BODY MASS INDEX DOCD: CPT | Mod: CPTII,S$GLB,, | Performed by: PSYCHIATRY & NEUROLOGY

## 2023-08-03 PROCEDURE — 99999 PR PBB SHADOW E&M-EST. PATIENT-LVL III: ICD-10-PCS | Mod: PBBFAC,,, | Performed by: PSYCHIATRY & NEUROLOGY

## 2023-08-03 PROCEDURE — 3008F PR BODY MASS INDEX (BMI) DOCUMENTED: ICD-10-PCS | Mod: CPTII,S$GLB,, | Performed by: PSYCHIATRY & NEUROLOGY

## 2023-08-03 PROCEDURE — 3052F PR MOST RECENT HEMOGLOBIN A1C LEVEL 8.0 - < 9.0%: ICD-10-PCS | Mod: CPTII,S$GLB,, | Performed by: PSYCHIATRY & NEUROLOGY

## 2023-08-03 PROCEDURE — 99999 PR PBB SHADOW E&M-EST. PATIENT-LVL III: CPT | Mod: PBBFAC,,, | Performed by: PSYCHIATRY & NEUROLOGY

## 2023-08-03 PROCEDURE — 3079F PR MOST RECENT DIASTOLIC BLOOD PRESSURE 80-89 MM HG: ICD-10-PCS | Mod: CPTII,S$GLB,, | Performed by: PSYCHIATRY & NEUROLOGY

## 2023-08-03 PROCEDURE — 3075F PR MOST RECENT SYSTOLIC BLOOD PRESS GE 130-139MM HG: ICD-10-PCS | Mod: CPTII,S$GLB,, | Performed by: PSYCHIATRY & NEUROLOGY

## 2023-08-03 PROCEDURE — 1159F PR MEDICATION LIST DOCUMENTED IN MEDICAL RECORD: ICD-10-PCS | Mod: CPTII,S$GLB,, | Performed by: PSYCHIATRY & NEUROLOGY

## 2023-08-03 PROCEDURE — 1159F MED LIST DOCD IN RCRD: CPT | Mod: CPTII,S$GLB,, | Performed by: PSYCHIATRY & NEUROLOGY

## 2023-08-03 NOTE — PROGRESS NOTES
Ian Cifuentes is a 58 y.o. year old male that  presents for evaluation of chronic muscle pain and weakness. He is accompanied by his wife.    HPI:  Mr Cifuentes has HLD, DM, NEETA, depression, ADHD, and GERD.  He endorses a long standing history of diffuse muscle pain and weakness that he strongly believes had worsened lately.  Stated that the muscle pain is typically triggered by physical activity but can happen even when he is at rest. For instance, he reports several episodes of muscle pain and weakness when he is holding the newspaper, the steering wheel, or when combing his hair and brushing his teeth. He got very concerned recently when he developed an unusual episode of severe jaw pain and weakness and he couldn't chew his food.  Occasional episodes of similar symptoms at work or when climbing stairs.  No muscle wasting or fasciculations, shortness of breath, double vision, extreme fatigue, slurred speech, language or visual impairment. No HA, vertigo, imbalance, falls, bladder or bowel impairment, tremors, or body stiffness. No skin rash. No change in urine color. No family history of muscle disease.   Had extensive but unrevealing rheumatological evaluation including normal CK and MyoMarker 3 panel.     Past Medical History:   Diagnosis Date    Acid reflux      Social History     Socioeconomic History    Marital status:    Tobacco Use    Smoking status: Never    Smokeless tobacco: Never   Substance and Sexual Activity    Alcohol use: Yes     Alcohol/week: 6.0 standard drinks of alcohol     Types: 6 Cans of beer per week     Comment: <1    Drug use: No    Sexual activity: Not Currently     Partners: Female     Past Surgical History:   Procedure Laterality Date    TONSILLECTOMY       Family History   Problem Relation Age of Onset    Diabetes Mother     Diabetes Father     Diabetes Sister     Diabetes Brother            Review of Systems  General ROS: negative for chills, fever or weight  "loss  Psychological ROS: negative for hallucination, delusions, or suicidal ideation  Ophthalmic ROS: negative for blurry vision, photophobia or eye pain  ENT ROS: negative for epistaxis, sore throat or rhinorrhea  Respiratory ROS: no cough, shortness of breath, or wheezing  Cardiovascular ROS: no chest pain or dyspnea on exertion  Gastrointestinal ROS: no abdominal pain, change in bowel habits, or black/ bloody stools  Genito-Urinary ROS: no dysuria, trouble voiding, or hematuria  Musculoskeletal ROS: negative for gait disturbance, POSITIVE for rather subjective muscular weakness  Neurological ROS: no syncope or seizures; no ataxia  Dermatological ROS: negative for pruritis, rash and jaundice      Physical Exam:  /86   Pulse 71   Ht 6' 1" (1.854 m)   Wt 115.8 kg (255 lb 4.7 oz)   BMI 33.68 kg/m²   General appearance: alert, cooperative, no distress  Constitutional:Oriented to person, place, and time.appears well-developed and well-nourished.   HEENT: Normocephalic, atraumatic, neck symmetrical, no nasal discharge   Eyes: conjunctivae/corneas clear, PERRL, EOM's intact  Lungs: clear to auscultation bilaterally, no dullness to percussion bilaterally  Heart: regular rate and rhythm without rub; no displacement of the PMI   Abdomen: soft, non-tender; bowel sounds normoactive; no organomegaly  Extremities: extremities symmetric; no clubbing, cyanosis, or edema  Integument: Skin color, texture, turgor normal; no rashes; hair distrubution normal  Neurologic:   Mental status: alert and awake, oriented x 4, comprehension, naming, and repetition intact. No right to left confusion. Performs serial 7's without difficulty .No dysarthria.  CN 2-12: pupils 4 mm bilaterally, reactive to light. Fundi without papilledema. Visual fields full to confrontation. EOM full without nystagmus. Face sensation normal in all distributions. Face symmetric. Hearing grossly intact. Palate elevates well. Tongue midline without atrophy or " fasciculations.  Motor: 5/5 all over  Sensory: intact in all modalities.  Muscle bulk: normal  DTR's: 2+ all over.  Plantars: no tested.  Coordination: finger to nose and heel-knee-shin intact.  Gait: no ataxia or bradykinesia     LABS:    Complete Blood Count  Lab Results   Component Value Date    RBC 5.06 06/08/2023    HGB 16.1 06/08/2023    HCT 47.0 06/08/2023    MCV 93 06/08/2023    MCH 31.8 (H) 06/08/2023    MCHC 34.3 06/08/2023    RDW 12.9 06/08/2023     06/08/2023    MPV 10.6 06/08/2023    GRAN 2.4 06/08/2023    GRAN 43.5 06/08/2023    LYMPH 2.1 06/08/2023    LYMPH 38.2 06/08/2023    MONO 0.6 06/08/2023    MONO 10.4 06/08/2023    EOS 0.4 06/08/2023    BASO 0.05 06/08/2023    EOSINOPHIL 6.6 06/08/2023    BASOPHIL 0.9 06/08/2023    DIFFMETHOD Automated 06/08/2023       Comprehensive Metabolic Panel  Lab Results   Component Value Date     (H) 06/08/2023    BUN 17 06/08/2023    CREATININE 1.0 06/08/2023     06/08/2023    K 4.7 06/08/2023     06/08/2023    PROT 6.9 06/08/2023    ALBUMIN 4.0 06/08/2023    BILITOT 0.7 06/08/2023    AST 24 06/08/2023    ALKPHOS 82 06/08/2023    CO2 25 06/08/2023    ALT 33 06/08/2023    ANIONGAP 9 06/08/2023    EGFRNONAA >60.0 01/11/2022    ESTGFRAFRICA >60.0 01/11/2022       TSH  Lab Results   Component Value Date    TSH 1.628 06/08/2023         Assessment: 57 y/o with HLD, DM, NEETA, depression, ADHD, and GERD, presents for evaluation of chronic diffuse muscle pain and subjective weakness.  Neuro exam unremarkable.  Extensive rheumatological testing including CK and MyoMarker 3 panel unrevealing so far.  Unclear etiology.  Will pursue EMG/NCV to better characterize his symptoms.          ICD-10-CM ICD-9-CM    1. Myopathy  G72.9 359.9 EMG W/ ULTRASOUND AND NERVE CONDUCTION TEST 2 Extremities      2. Paresthesia  R20.2 782.0 Ambulatory referral/consult to Neurology        The primary encounter diagnosis was Myopathy. A diagnosis of Paresthesia was also  pertinent to this visit.      Plan:  1) Chronic diffuse body pain-weakness: as above  2) HLD  3) DM  4) NEETA  5) Depression  6) ADHD  7) GERD              Orders Placed This Encounter   Procedures    EMG W/ ULTRASOUND AND NERVE CONDUCTION TEST 2 Extremities           Chase Benites MD

## 2023-08-14 ENCOUNTER — TELEPHONE (OUTPATIENT)
Dept: NEUROLOGY | Facility: CLINIC | Age: 58
End: 2023-08-14
Payer: COMMERCIAL

## 2023-08-14 NOTE — TELEPHONE ENCOUNTER
----- Message from Preston Ferguson sent at 8/11/2023  4:24 PM CDT -----  Regarding: RE: EMG  SONJA Chua 8/31 at 8:30am.  Can you please assist with confirming? Thanks so much!    ~Preston    ----- Message -----  From: Geo Hodge MA  Sent: 8/3/2023   3:42 PM CDT  To: Preston Ferguson  Subject: EMG                                              Kimberley Johnston,     This pt needs to be scheduled for an EMG with ultrasound and nerve conduction test 2 extremities. Dr. Benites has put the orders in the system and I informed the pt you will be contacting them to schedule.     Geo Washburn

## 2023-08-14 NOTE — TELEPHONE ENCOUNTER
Called and spoke to pt. Pt asked if EMG was on upper or lower extremities. I told pt I would send message to Dr. Benites and pt verbalized understanding. Pt also confirmed date/time of appt.

## 2023-08-16 ENCOUNTER — PATIENT MESSAGE (OUTPATIENT)
Dept: INTERNAL MEDICINE | Facility: CLINIC | Age: 58
End: 2023-08-16
Payer: COMMERCIAL

## 2023-08-16 DIAGNOSIS — F33.0 DEPRESSION, MAJOR, RECURRENT, MILD: Primary | ICD-10-CM

## 2023-08-17 RX ORDER — BUPROPION HYDROCHLORIDE 150 MG/1
150 TABLET ORAL DAILY
Qty: 30 TABLET | Refills: 5 | Status: SHIPPED | OUTPATIENT
Start: 2023-08-17 | End: 2023-09-14

## 2023-08-31 ENCOUNTER — TELEPHONE (OUTPATIENT)
Dept: NEUROLOGY | Facility: CLINIC | Age: 58
End: 2023-08-31
Payer: COMMERCIAL

## 2023-08-31 NOTE — TELEPHONE ENCOUNTER
Called and received pt's vm. Left message w clinic number and instructed pt call back to be scheduled.

## 2023-09-05 ENCOUNTER — TELEPHONE (OUTPATIENT)
Dept: NEUROLOGY | Facility: CLINIC | Age: 58
End: 2023-09-05
Payer: COMMERCIAL

## 2023-09-05 NOTE — TELEPHONE ENCOUNTER
----- Message from Andree Trevino sent at 9/1/2023 11:10 AM CDT -----  Regarding: Call Back  Contact: Pt [phone]  Pt is returning a call please call

## 2023-09-05 NOTE — TELEPHONE ENCOUNTER
Called and spoke w pt. Pt explained that he is getting results from external EMG and was wondering how to get them to Dr. Benites's office. Discussed various methods for returning results and pt verbalized understanding.

## 2023-09-06 ENCOUNTER — PATIENT MESSAGE (OUTPATIENT)
Dept: NEUROLOGY | Facility: CLINIC | Age: 58
End: 2023-09-06
Payer: COMMERCIAL

## 2023-09-13 DIAGNOSIS — E11.9 DIABETES MELLITUS WITHOUT COMPLICATION: Primary | ICD-10-CM

## 2023-09-14 ENCOUNTER — LAB VISIT (OUTPATIENT)
Dept: LAB | Facility: HOSPITAL | Age: 58
End: 2023-09-14
Attending: INTERNAL MEDICINE
Payer: COMMERCIAL

## 2023-09-14 DIAGNOSIS — F33.0 DEPRESSION, MAJOR, RECURRENT, MILD: ICD-10-CM

## 2023-09-14 DIAGNOSIS — E11.9 DIABETES MELLITUS WITHOUT COMPLICATION: ICD-10-CM

## 2023-09-14 DIAGNOSIS — E11.9 TYPE 2 DIABETES MELLITUS WITHOUT COMPLICATION: ICD-10-CM

## 2023-09-14 LAB
ESTIMATED AVG GLUCOSE: 189 MG/DL (ref 68–131)
HBA1C MFR BLD: 8.2 % (ref 4–5.6)

## 2023-09-14 PROCEDURE — 36415 COLL VENOUS BLD VENIPUNCTURE: CPT | Performed by: INTERNAL MEDICINE

## 2023-09-14 PROCEDURE — 83036 HEMOGLOBIN GLYCOSYLATED A1C: CPT | Performed by: INTERNAL MEDICINE

## 2023-09-14 RX ORDER — BUPROPION HYDROCHLORIDE 150 MG/1
150 TABLET ORAL
Qty: 90 TABLET | Refills: 0 | Status: SHIPPED | OUTPATIENT
Start: 2023-09-14 | End: 2023-12-15

## 2023-09-14 NOTE — TELEPHONE ENCOUNTER
No care due was identified.  [facility] Embedded Care Due Messages. Reference number: [id number].   9/14/2023 12:03:58 PM CDT

## 2023-09-14 NOTE — TELEPHONE ENCOUNTER
Refill Routing Note   Medication(s) are not appropriate for processing by [facility] for the following reason(s):      New or recently adjusted medication    [facility] action(s):  Defer Care Due:  None identified            Appointments  past 12m or future 3m with PCP    Date Provider   Last Visit   7/25/2022 Elias Garcia MD   Next Visit   9/22/2023 Elias Garcia MD   ED visits in past 90 days: 0        Note composed:1:20 PM 09/14/2023

## 2023-09-18 DIAGNOSIS — M62.81 MUSCLE WEAKNESS: Primary | ICD-10-CM

## 2023-09-22 ENCOUNTER — OFFICE VISIT (OUTPATIENT)
Dept: PRIMARY CARE CLINIC | Facility: CLINIC | Age: 58
End: 2023-09-22
Payer: COMMERCIAL

## 2023-09-22 VITALS
WEIGHT: 256.81 LBS | OXYGEN SATURATION: 96 % | DIASTOLIC BLOOD PRESSURE: 74 MMHG | RESPIRATION RATE: 18 BRPM | HEIGHT: 73 IN | SYSTOLIC BLOOD PRESSURE: 118 MMHG | BODY MASS INDEX: 34.04 KG/M2 | HEART RATE: 82 BPM | TEMPERATURE: 97 F

## 2023-09-22 DIAGNOSIS — E78.2 MIXED HYPERLIPIDEMIA: Primary | ICD-10-CM

## 2023-09-22 DIAGNOSIS — F90.0 ATTENTION DEFICIT HYPERACTIVITY DISORDER (ADHD), PREDOMINANTLY INATTENTIVE TYPE: ICD-10-CM

## 2023-09-22 DIAGNOSIS — E11.9 DIABETES MELLITUS WITHOUT COMPLICATION: ICD-10-CM

## 2023-09-22 DIAGNOSIS — F33.0 DEPRESSION, MAJOR, RECURRENT, MILD: ICD-10-CM

## 2023-09-22 DIAGNOSIS — I83.892 VARICOSE VEINS OF LEFT LEG WITH EDEMA: ICD-10-CM

## 2023-09-22 PROCEDURE — 99999 PR PBB SHADOW E&M-EST. PATIENT-LVL IV: ICD-10-PCS | Mod: PBBFAC,,, | Performed by: INTERNAL MEDICINE

## 2023-09-22 PROCEDURE — 99214 OFFICE O/P EST MOD 30 MIN: CPT | Mod: S$GLB,,, | Performed by: INTERNAL MEDICINE

## 2023-09-22 PROCEDURE — 3074F SYST BP LT 130 MM HG: CPT | Mod: CPTII,S$GLB,, | Performed by: INTERNAL MEDICINE

## 2023-09-22 PROCEDURE — 1159F PR MEDICATION LIST DOCUMENTED IN MEDICAL RECORD: ICD-10-PCS | Mod: CPTII,S$GLB,, | Performed by: INTERNAL MEDICINE

## 2023-09-22 PROCEDURE — 99999 PR PBB SHADOW E&M-EST. PATIENT-LVL IV: CPT | Mod: PBBFAC,,, | Performed by: INTERNAL MEDICINE

## 2023-09-22 PROCEDURE — 3078F PR MOST RECENT DIASTOLIC BLOOD PRESSURE < 80 MM HG: ICD-10-PCS | Mod: CPTII,S$GLB,, | Performed by: INTERNAL MEDICINE

## 2023-09-22 PROCEDURE — 1160F RVW MEDS BY RX/DR IN RCRD: CPT | Mod: CPTII,S$GLB,, | Performed by: INTERNAL MEDICINE

## 2023-09-22 PROCEDURE — 3008F PR BODY MASS INDEX (BMI) DOCUMENTED: ICD-10-PCS | Mod: CPTII,S$GLB,, | Performed by: INTERNAL MEDICINE

## 2023-09-22 PROCEDURE — 99214 PR OFFICE/OUTPT VISIT, EST, LEVL IV, 30-39 MIN: ICD-10-PCS | Mod: S$GLB,,, | Performed by: INTERNAL MEDICINE

## 2023-09-22 PROCEDURE — 3052F HG A1C>EQUAL 8.0%<EQUAL 9.0%: CPT | Mod: CPTII,S$GLB,, | Performed by: INTERNAL MEDICINE

## 2023-09-22 PROCEDURE — 3074F PR MOST RECENT SYSTOLIC BLOOD PRESSURE < 130 MM HG: ICD-10-PCS | Mod: CPTII,S$GLB,, | Performed by: INTERNAL MEDICINE

## 2023-09-22 PROCEDURE — 3052F PR MOST RECENT HEMOGLOBIN A1C LEVEL 8.0 - < 9.0%: ICD-10-PCS | Mod: CPTII,S$GLB,, | Performed by: INTERNAL MEDICINE

## 2023-09-22 PROCEDURE — 3078F DIAST BP <80 MM HG: CPT | Mod: CPTII,S$GLB,, | Performed by: INTERNAL MEDICINE

## 2023-09-22 PROCEDURE — 3008F BODY MASS INDEX DOCD: CPT | Mod: CPTII,S$GLB,, | Performed by: INTERNAL MEDICINE

## 2023-09-22 PROCEDURE — 1159F MED LIST DOCD IN RCRD: CPT | Mod: CPTII,S$GLB,, | Performed by: INTERNAL MEDICINE

## 2023-09-22 PROCEDURE — 1160F PR REVIEW ALL MEDS BY PRESCRIBER/CLIN PHARMACIST DOCUMENTED: ICD-10-PCS | Mod: CPTII,S$GLB,, | Performed by: INTERNAL MEDICINE

## 2023-09-22 RX ORDER — METFORMIN HYDROCHLORIDE 500 MG/1
500 TABLET, EXTENDED RELEASE ORAL 2 TIMES DAILY WITH MEALS
Qty: 180 TABLET | Refills: 3 | Status: SHIPPED | OUTPATIENT
Start: 2023-09-22 | End: 2024-09-21

## 2023-09-22 RX ORDER — ESCITALOPRAM OXALATE 10 MG/1
10 TABLET ORAL DAILY
Qty: 30 TABLET | Refills: 5 | Status: SHIPPED | OUTPATIENT
Start: 2023-09-22 | End: 2023-12-04 | Stop reason: SDUPTHER

## 2023-09-22 RX ORDER — DEXTROAMPHETAMINE SACCHARATE, AMPHETAMINE ASPARTATE MONOHYDRATE, DEXTROAMPHETAMINE SULFATE AND AMPHETAMINE SULFATE 5; 5; 5; 5 MG/1; MG/1; MG/1; MG/1
20 CAPSULE, EXTENDED RELEASE ORAL EVERY MORNING
Qty: 30 CAPSULE | Refills: 0 | Status: SHIPPED | OUTPATIENT
Start: 2023-09-22 | End: 2024-03-05 | Stop reason: SDUPTHER

## 2023-09-22 NOTE — PROGRESS NOTES
[facility] Note    Chief Complaint      Chief Complaint   Patient presents with    Referral    Follow-up       History of Present Illness      Ian Cifuentes is a 58 y.o. male who presents today for   Chief Complaint   Patient presents with    Referral    Follow-up   .  Patient comes to appointment here for 6m checkup for chronic issues as below .his a1c is better at 8.2 but still high . Is mno longer taking janumet due to cost . Is on 500 metformin xr only . He is trying to be stricter with diet . Vyvanse could not afford . He is also c/o varicosities in left lower ext     HPI    No problem-specific Assessment & Plan notes found for this encounter.       Problem List Items Addressed This Visit          Psychiatric    Depression, major, recurrent, mild    Overview     Will add lexapro to welbutrin . He will call back with update          Attention deficit hyperactivity disorder (ADHD), predominantly inattentive type    Overview     adderall xr 20 mg secondary to cost             Cardiac/Vascular    Mixed hyperlipidemia - Primary    Overview     Refill crestor cont current             Endocrine    Diabetes mellitus without complication    Overview      a1c high at 8.2 will increase metormim xl to 1000 mg           Other Visit Diagnoses       Varicose veins of left leg with edema                 Past Medical History:  Past Medical History:   Diagnosis Date    Acid reflux        Past Surgical History:  Past Surgical History:   Procedure Laterality Date    TONSILLECTOMY         Family History:  family history includes Diabetes in his brother, father, mother, and sister.     Social History:  Social History     Socioeconomic History    Marital status:    Tobacco Use    Smoking status: Never    Smokeless tobacco: Never   Substance and Sexual Activity    Alcohol use: Yes     Alcohol/week: 6.0 standard drinks of alcohol     Types: 6 Cans of beer per week     Comment: <1    Drug use: No     Sexual activity: Not Currently     Partners: Female       Review of Systems:   Review of Systems   Constitutional:  Negative for fever and weight loss.   HENT:  Negative for congestion, hearing loss and sore throat.    Eyes:  Negative for blurred vision.   Respiratory:  Negative for cough and shortness of breath.    Cardiovascular:  Negative for chest pain, palpitations, claudication and leg swelling.   Gastrointestinal:  Negative for abdominal pain, constipation, diarrhea and heartburn.   Genitourinary:  Negative for dysuria.   Musculoskeletal:  Negative for back pain and myalgias.   Skin:  Negative for rash.   Neurological:  Negative for focal weakness and headaches.   Psychiatric/Behavioral:  Positive for depression. Negative for memory loss and suicidal ideas. The patient is not nervous/anxious.         Medications:  Outpatient Encounter Medications as of 9/22/2023   Medication Sig Dispense Refill    buPROPion (WELLBUTRIN XL) 150 MG TB24 tablet TAKE ONE TABLET BY MOUTH EVERY DAY 90 tablet 0    pantoprazole (PROTONIX) 40 MG tablet TAKE ONE TABLET BY MOUTH EVERY DAY 90 tablet 1    rosuvastatin (CRESTOR) 20 MG tablet Take 1 tablet (20 mg total) by mouth once daily. 90 tablet 3    [DISCONTINUED] metFORMIN (GLUCOPHAGE-XR) 500 MG ER 24hr tablet Take 1 tablet (500 mg total) by mouth daily with breakfast. 90 tablet 3    metFORMIN (GLUCOPHAGE-XR) 500 MG ER 24hr tablet Take 1 tablet (500 mg total) by mouth 2 (two) times daily with meals. 180 tablet 3    [DISCONTINUED] buPROPion (WELLBUTRIN XL) 150 MG TB24 tablet Take 1 tablet (150 mg total) by mouth once daily. 30 tablet 5    [DISCONTINUED] DULoxetine (CYMBALTA) 60 MG capsule Take 1 capsule (60 mg total) by mouth once daily. (Patient not taking: Reported on 6/20/2023) 30 capsule 11    [DISCONTINUED] SITagliptan-metformin (JANUMET XR)  mg TM24 Take by mouth.       No facility-administered encounter medications on file as of 9/22/2023.       Allergies:  Review of  "patient's allergies indicates:  No Known Allergies      Physical Exam      Vitals:    09/22/23 1308   BP: 118/74   Pulse: 82   Resp: 18   Temp: 97 °F (36.1 °C)        Vital Signs  Temp: 97 °F (36.1 °C)  Temp Source: Oral  Pulse: 82  Resp: 18  SpO2: 96 %  BP: 118/74  BP Location: Right arm  Patient Position: Sitting  Pain Score: 0-No pain  Height and Weight  Height: 6' 1" (185.4 cm)  Weight: 116.5 kg (256 lb 13.4 oz)  BSA (Calculated - sq m): 2.45 sq meters  BMI (Calculated): 33.9  Weight in (lb) to have BMI = 25: 189.1]     Body mass index is 33.89 kg/m².    Physical Exam  Constitutional:       Appearance: He is well-developed.   HENT:      Head: Normocephalic.   Eyes:      Pupils: Pupils are equal, round, and reactive to light.   Neck:      Thyroid: No thyromegaly.   Cardiovascular:      Rate and Rhythm: Normal rate and regular rhythm.      Heart sounds: No murmur heard.     No friction rub. No gallop.   Pulmonary:      Effort: Pulmonary effort is normal.      Breath sounds: Normal breath sounds.   Abdominal:      General: Bowel sounds are normal.      Palpations: Abdomen is soft.   Musculoskeletal:         General: Normal range of motion.      Cervical back: Normal range of motion.   Skin:     General: Skin is warm and dry.   Neurological:      Mental Status: He is alert and oriented to person, place, and time.      Sensory: No sensory deficit.   Psychiatric:         Behavior: Behavior normal.          Laboratory:  CBC:  No results for input(s): "WBC", "RBC", "HGB", "HCT", "PLT", "MCV", "MCH", "MCHC" in the last 2160 hours.  CMP:  No results for input(s): "GLU", "CALCIUM", "ALBUMIN", "PROT", "NA", "K", "CO2", "CL", "BUN", "ALKPHOS", "ALT", "AST", "BILITOT" in the last 2160 hours.    Invalid input(s): "CREATININ"  URINALYSIS:  No results for input(s): "COLORU", "CLARITYU", "SPECGRAV", "PHUR", "PROTEINUA", "GLUCOSEU", "BILIRUBINCON", "BLOODU", "WBCU", "RBCU", "BACTERIA", "MUCUS", "NITRITE", "LEUKOCYTESUR", " ""UROBILINOGEN", "HYALINECASTS" in the last 2160 hours.   LIPIDS:  No results for input(s): "TSH", "HDL", "CHOL", "TRIG", "LDLCALC", "CHOLHDL", "NONHDLCHOL", "TOTALCHOLEST" in the last 2160 hours.  TSH:  No results for input(s): "TSH" in the last 2160 hours.  A1C:  Recent Labs   Lab Result Units 09/14/23  1629   Hemoglobin A1C % 8.2*       Radiology:        Assessment:     Ian Cifuentes is a 58 y.o.male with:    Mixed hyperlipidemia    Diabetes mellitus without complication  -     metFORMIN (GLUCOPHAGE-XR) 500 MG ER 24hr tablet; Take 1 tablet (500 mg total) by mouth 2 (two) times daily with meals.  Dispense: 180 tablet; Refill: 3    Attention deficit hyperactivity disorder (ADHD), predominantly inattentive type    Depression, major, recurrent, mild    Varicose veins of left leg with edema                Plan:     Problem List Items Addressed This Visit          Psychiatric    Depression, major, recurrent, mild    Overview     Will add lexapro to welbutrin . He will call back with update          Attention deficit hyperactivity disorder (ADHD), predominantly inattentive type    Overview     adderall xr 20 mg secondary to cost             Cardiac/Vascular    Mixed hyperlipidemia - Primary    Overview     Refill crestor cont current             Endocrine    Diabetes mellitus without complication    Overview      a1c high at 8.2 will increase metormim xl to 1000 mg           Other Visit Diagnoses       Varicose veins of left leg with edema                As above, continue current medications and maintain follow up with specialists.  Return to clinic in 3 months.      Frederick W Dantagnan [facility]                  "

## 2023-10-09 DIAGNOSIS — M79.89 PAIN AND SWELLING OF LOWER EXTREMITY, UNSPECIFIED LATERALITY: Primary | ICD-10-CM

## 2023-10-09 DIAGNOSIS — M79.606 PAIN AND SWELLING OF LOWER EXTREMITY, UNSPECIFIED LATERALITY: Primary | ICD-10-CM

## 2023-10-12 ENCOUNTER — HOSPITAL ENCOUNTER (OUTPATIENT)
Dept: RADIOLOGY | Facility: OTHER | Age: 58
Discharge: HOME | End: 2023-10-12
Attending: SURGERY
Payer: COMMERCIAL

## 2023-10-12 DIAGNOSIS — M79.89 PAIN AND SWELLING OF LOWER EXTREMITY, UNSPECIFIED LATERALITY: ICD-10-CM

## 2023-10-12 DIAGNOSIS — M79.606 PAIN AND SWELLING OF LOWER EXTREMITY, UNSPECIFIED LATERALITY: ICD-10-CM

## 2023-10-12 PROCEDURE — 93970 US LOWER EXTREMITY VEINS BILATERAL INSUFFICIENCY: ICD-10-PCS | Mod: 26,,, | Performed by: STUDENT IN AN ORGANIZED HEALTH CARE EDUCATION/TRAINING PROGRAM

## 2023-10-12 PROCEDURE — 93970 EXTREMITY STUDY: CPT | Mod: 26,,, | Performed by: STUDENT IN AN ORGANIZED HEALTH CARE EDUCATION/TRAINING PROGRAM

## 2023-10-12 PROCEDURE — 93970 EXTREMITY STUDY: CPT | Mod: TC

## 2023-10-19 ENCOUNTER — LAB VISIT (OUTPATIENT)
Dept: LAB | Facility: HOSPITAL | Age: 58
End: 2023-10-19
Attending: STUDENT IN AN ORGANIZED HEALTH CARE EDUCATION/TRAINING PROGRAM
Payer: COMMERCIAL

## 2023-10-19 ENCOUNTER — OFFICE VISIT (OUTPATIENT)
Dept: NEUROLOGY | Facility: CLINIC | Age: 58
End: 2023-10-19
Payer: COMMERCIAL

## 2023-10-19 VITALS
HEART RATE: 56 BPM | SYSTOLIC BLOOD PRESSURE: 147 MMHG | WEIGHT: 253.5 LBS | DIASTOLIC BLOOD PRESSURE: 89 MMHG | BODY MASS INDEX: 33.6 KG/M2 | HEIGHT: 73 IN

## 2023-10-19 DIAGNOSIS — G62.9 NEUROPATHY: Primary | ICD-10-CM

## 2023-10-19 DIAGNOSIS — G62.9 NEUROPATHY: ICD-10-CM

## 2023-10-19 DIAGNOSIS — M62.81 MUSCLE WEAKNESS: ICD-10-CM

## 2023-10-19 LAB — CK SERPL-CCNC: 158 U/L (ref 20–200)

## 2023-10-19 PROCEDURE — 3077F PR MOST RECENT SYSTOLIC BLOOD PRESSURE >= 140 MM HG: ICD-10-PCS | Mod: CPTII,S$GLB,, | Performed by: STUDENT IN AN ORGANIZED HEALTH CARE EDUCATION/TRAINING PROGRAM

## 2023-10-19 PROCEDURE — 99215 OFFICE O/P EST HI 40 MIN: CPT | Mod: S$GLB,,, | Performed by: STUDENT IN AN ORGANIZED HEALTH CARE EDUCATION/TRAINING PROGRAM

## 2023-10-19 PROCEDURE — 86334 PATHOLOGIST INTERPRETATION IFE: ICD-10-PCS | Mod: 26,,, | Performed by: PATHOLOGY

## 2023-10-19 PROCEDURE — 99999 PR PBB SHADOW E&M-EST. PATIENT-LVL III: CPT | Mod: PBBFAC,,, | Performed by: STUDENT IN AN ORGANIZED HEALTH CARE EDUCATION/TRAINING PROGRAM

## 2023-10-19 PROCEDURE — 99999 PR PBB SHADOW E&M-EST. PATIENT-LVL III: ICD-10-PCS | Mod: PBBFAC,,, | Performed by: STUDENT IN AN ORGANIZED HEALTH CARE EDUCATION/TRAINING PROGRAM

## 2023-10-19 PROCEDURE — 3077F SYST BP >= 140 MM HG: CPT | Mod: CPTII,S$GLB,, | Performed by: STUDENT IN AN ORGANIZED HEALTH CARE EDUCATION/TRAINING PROGRAM

## 2023-10-19 PROCEDURE — 3052F PR MOST RECENT HEMOGLOBIN A1C LEVEL 8.0 - < 9.0%: ICD-10-PCS | Mod: CPTII,S$GLB,, | Performed by: STUDENT IN AN ORGANIZED HEALTH CARE EDUCATION/TRAINING PROGRAM

## 2023-10-19 PROCEDURE — 86334 IMMUNOFIX E-PHORESIS SERUM: CPT | Performed by: STUDENT IN AN ORGANIZED HEALTH CARE EDUCATION/TRAINING PROGRAM

## 2023-10-19 PROCEDURE — 1159F PR MEDICATION LIST DOCUMENTED IN MEDICAL RECORD: ICD-10-PCS | Mod: CPTII,S$GLB,, | Performed by: STUDENT IN AN ORGANIZED HEALTH CARE EDUCATION/TRAINING PROGRAM

## 2023-10-19 PROCEDURE — 3079F PR MOST RECENT DIASTOLIC BLOOD PRESSURE 80-89 MM HG: ICD-10-PCS | Mod: CPTII,S$GLB,, | Performed by: STUDENT IN AN ORGANIZED HEALTH CARE EDUCATION/TRAINING PROGRAM

## 2023-10-19 PROCEDURE — 86235 NUCLEAR ANTIGEN ANTIBODY: CPT | Performed by: STUDENT IN AN ORGANIZED HEALTH CARE EDUCATION/TRAINING PROGRAM

## 2023-10-19 PROCEDURE — 84207 ASSAY OF VITAMIN B-6: CPT | Performed by: STUDENT IN AN ORGANIZED HEALTH CARE EDUCATION/TRAINING PROGRAM

## 2023-10-19 PROCEDURE — 84165 PROTEIN E-PHORESIS SERUM: CPT | Performed by: STUDENT IN AN ORGANIZED HEALTH CARE EDUCATION/TRAINING PROGRAM

## 2023-10-19 PROCEDURE — 3008F BODY MASS INDEX DOCD: CPT | Mod: CPTII,S$GLB,, | Performed by: STUDENT IN AN ORGANIZED HEALTH CARE EDUCATION/TRAINING PROGRAM

## 2023-10-19 PROCEDURE — 82607 VITAMIN B-12: CPT | Performed by: STUDENT IN AN ORGANIZED HEALTH CARE EDUCATION/TRAINING PROGRAM

## 2023-10-19 PROCEDURE — 3052F HG A1C>EQUAL 8.0%<EQUAL 9.0%: CPT | Mod: CPTII,S$GLB,, | Performed by: STUDENT IN AN ORGANIZED HEALTH CARE EDUCATION/TRAINING PROGRAM

## 2023-10-19 PROCEDURE — 99215 PR OFFICE/OUTPT VISIT, EST, LEVL V, 40-54 MIN: ICD-10-PCS | Mod: S$GLB,,, | Performed by: STUDENT IN AN ORGANIZED HEALTH CARE EDUCATION/TRAINING PROGRAM

## 2023-10-19 PROCEDURE — 3008F PR BODY MASS INDEX (BMI) DOCUMENTED: ICD-10-PCS | Mod: CPTII,S$GLB,, | Performed by: STUDENT IN AN ORGANIZED HEALTH CARE EDUCATION/TRAINING PROGRAM

## 2023-10-19 PROCEDURE — 84165 PROTEIN E-PHORESIS SERUM: CPT | Mod: 26,,, | Performed by: PATHOLOGY

## 2023-10-19 PROCEDURE — 82746 ASSAY OF FOLIC ACID SERUM: CPT | Performed by: STUDENT IN AN ORGANIZED HEALTH CARE EDUCATION/TRAINING PROGRAM

## 2023-10-19 PROCEDURE — 86334 IMMUNOFIX E-PHORESIS SERUM: CPT | Mod: 26,,, | Performed by: PATHOLOGY

## 2023-10-19 PROCEDURE — 82085 ASSAY OF ALDOLASE: CPT | Performed by: STUDENT IN AN ORGANIZED HEALTH CARE EDUCATION/TRAINING PROGRAM

## 2023-10-19 PROCEDURE — 86235 NUCLEAR ANTIGEN ANTIBODY: CPT | Mod: 59 | Performed by: STUDENT IN AN ORGANIZED HEALTH CARE EDUCATION/TRAINING PROGRAM

## 2023-10-19 PROCEDURE — 3079F DIAST BP 80-89 MM HG: CPT | Mod: CPTII,S$GLB,, | Performed by: STUDENT IN AN ORGANIZED HEALTH CARE EDUCATION/TRAINING PROGRAM

## 2023-10-19 PROCEDURE — 84425 ASSAY OF VITAMIN B-1: CPT | Performed by: STUDENT IN AN ORGANIZED HEALTH CARE EDUCATION/TRAINING PROGRAM

## 2023-10-19 PROCEDURE — 82550 ASSAY OF CK (CPK): CPT | Performed by: STUDENT IN AN ORGANIZED HEALTH CARE EDUCATION/TRAINING PROGRAM

## 2023-10-19 PROCEDURE — 1159F MED LIST DOCD IN RCRD: CPT | Mod: CPTII,S$GLB,, | Performed by: STUDENT IN AN ORGANIZED HEALTH CARE EDUCATION/TRAINING PROGRAM

## 2023-10-19 PROCEDURE — 84165 PATHOLOGIST INTERPRETATION SPE: ICD-10-PCS | Mod: 26,,, | Performed by: PATHOLOGY

## 2023-10-19 RX ORDER — MELOXICAM 7.5 MG/1
7.5 TABLET ORAL DAILY
COMMUNITY
End: 2023-12-04

## 2023-10-19 NOTE — PROGRESS NOTES
Chief Complaint and Duration     Chief Complaint   Patient presents with    Muscle biopsy       History of Present Illness     Ian Cifuentes is a 58 y.o. male who was recently evaluated by prior neurologists in August of 2023.  Patient has a history of hyperlipidemia, diabetes, NEETA, depression, ADHD, GERD.  Endorses longstanding history of diffuse muscle pain and weakness.  Feels that it has been worse, triggered by physical activity.  For episodes of muscle pain and weakness when he is holding the newspaper, serum well, or when combing his hair or brushing his teeth.  Denies any muscle wasting or fasciculations.  Has had extensive workup thus far including normal CK and MyoMarker 3 panel.  EMG has not been completed.  Has also been evaluated by Rheumatology.    Main complaint is that he feels muscle aching.  Does have mild chronic lower back pain.  Most recent x-ray in June shows he has degenerative changes.  No significant stenosis was noted.    Review of patient's allergies indicates:  No Known Allergies  Current Outpatient Medications   Medication Sig Dispense Refill    buPROPion (WELLBUTRIN XL) 150 MG TB24 tablet TAKE ONE TABLET BY MOUTH EVERY DAY 90 tablet 0    dextroamphetamine-amphetamine (ADDERALL XR) 20 MG 24 hr capsule Take 1 capsule (20 mg total) by mouth every morning. 30 capsule 0    EScitalopram oxalate (LEXAPRO) 10 MG tablet Take 1 tablet (10 mg total) by mouth once daily. 30 tablet 5    meloxicam (MOBIC) 7.5 MG tablet Take 7.5 mg by mouth once daily.      metFORMIN (GLUCOPHAGE-XR) 500 MG ER 24hr tablet Take 1 tablet (500 mg total) by mouth 2 (two) times daily with meals. 180 tablet 3    pantoprazole (PROTONIX) 40 MG tablet Take 1 tablet (40 mg total) by mouth once daily. 90 tablet 3    rosuvastatin (CRESTOR) 20 MG tablet Take 1 tablet (20 mg total) by mouth once daily. 90 tablet 3     No current facility-administered medications for this visit.       Medical History     Past Medical History:    Diagnosis Date    Acid reflux      Past Surgical History:   Procedure Laterality Date    TONSILLECTOMY       Family History   Problem Relation Age of Onset    Diabetes Mother     Diabetes Father     Diabetes Sister     Diabetes Brother      Social History     Socioeconomic History    Marital status:    Tobacco Use    Smoking status: Never    Smokeless tobacco: Never   Substance and Sexual Activity    Alcohol use: Yes     Alcohol/week: 6.0 standard drinks of alcohol     Types: 6 Cans of beer per week     Comment: <1    Drug use: No    Sexual activity: Not Currently     Partners: Female       Exam     Vitals:    10/19/23 1006   BP: (!) 147/89   Pulse: (!) 56      Physical Exam:  General: Not in acute distress. Not ill-appearing.   HENT: Normocephalic and atraumatic. Moist mucous membranes.  Eyes: Conjunctivae normal.   Pulmonary: Pulmonary effort is normal.   Abdominal: Abdomen is soft and flat.   Skin: Skin is warm and dry. No rashes.   Psychiatric: Mood normal.        Neurologic Exam   Mental status: oriented to person, place, and time  Attention: Normal. Concentration: normal.  Speech: speech is normal.  Cranial Nerves: PERRL, EOMI intact, V1-V3 Facial sensation intact. Symmetric facies. Hearing grossly intact. Palate and uvula midline, symmetric. No tongue deviation. Trapezius strength intact.     Motor exam: bulk and tone normal. Strength 5/5 in bilateral upper extremities: deltoids, biceps, triceps, wrist flexion/extension, finger abduction/adduction. Strength 5/5 in bilateral lower extremities: hip flexion/extension, thigh adduction/abduction, knee flexion/extension, dorsiflexion/plantarflexion, foot eversion/inversion.    Reflexes: 2+ in bilateral upper extremities: biceps and brachiaradialis, 2+ in bilateral lower extremities: patellar and achilles  Plantar reflex: normal  Childress's/Clonus: negative    Sensory exam: light touch intact    Gait exam: normal  Romberg: negative  Coordination:  normal    Tremor: none  Cogwheel rigidity: none    Labs and Imaging     Labs: reviewed  No results found for this or any previous visit (from the past 24 hour(s)).    A1c of 8.2  Imaging:   I have personally reviewed the images performed.   X-ray of the lumbar spine in June of 2023 with no significant stenosis    Assessment and Plan     Problem List Items Addressed This Visit    None  Visit Diagnoses       Neuropathy    -  Primary    Relevant Orders    Folate (Completed)    Immunofixation Electrophoresis (Completed)    Protein Electrophoresis, Serum (Completed)    Vitamin B1 (Completed)    Vitamin B12 (Completed)    Vitamin B6 (Completed)    EMG W/ ULTRASOUND AND NERVE CONDUCTION TEST 4 Extremities    Muscle weakness        Relevant Orders    Aldolase (Completed)    CK (Completed)    Tenisha-Patricia 1 antibody, IgG (Completed)    PM-Scl Antibody by Immunodiffusion (Completed)          This is a patient new to me, here for another opinion.  Was seeing prior neurologist.    This is a 58-year-old male with fatigable type of weakness, states he feels his muscles aching.  This is a longstanding history.  Does endorse numbness and tingling in his feet.  Does have diabetes.  Mild chronic lower back pain, denies radicular features.  Patient's strength 5/5 on exam today.  Agree can work patient up for neuropathy with possible myelopathic component.  We will get labs for neuropathy, get an EMG to see if there are any myopathic units.  We will also get a myositis panel as well.    Patient is not taking a statin.    Discussed with the patient we would do this workup before proceeding to a muscle biopsy if needed, patient without evidence of a myopathy or weakness in his certain muscle on this exam today.    Follow-up:  On EMG, pending    Time spent on this encounter:  40 minutes. This includes face to face time and non-face to face time preparing to see the patient (eg, review of tests), obtaining and/or reviewing separately obtained  history, documenting clinical information in the electronic or other health record, independently interpreting results and communicating results to the patient/family/caregiver, or care coordinator.     This note was created by combination of typed  and M-Modal dictation. Transcription and phonetic errors may be present.  If there are any questions, please contact me.

## 2023-10-20 LAB
ALBUMIN SERPL ELPH-MCNC: 3.99 G/DL (ref 3.35–5.55)
ALDOLASE SERPL-CCNC: 6.1 U/L (ref 1.2–7.6)
ALPHA1 GLOB SERPL ELPH-MCNC: 0.25 G/DL (ref 0.17–0.41)
ALPHA2 GLOB SERPL ELPH-MCNC: 0.67 G/DL (ref 0.43–0.99)
B-GLOBULIN SERPL ELPH-MCNC: 0.68 G/DL (ref 0.5–1.1)
FOLATE SERPL-MCNC: 6.6 NG/ML (ref 4–24)
GAMMA GLOB SERPL ELPH-MCNC: 0.71 G/DL (ref 0.67–1.58)
INTERPRETATION SERPL IFE-IMP: NORMAL
PATHOLOGIST INTERPRETATION IFE: NORMAL
PATHOLOGIST INTERPRETATION SPE: NORMAL
PROT SERPL-MCNC: 6.3 G/DL (ref 6–8.4)
VIT B12 SERPL-MCNC: 352 PG/ML (ref 210–950)

## 2023-10-23 LAB
ENA JO1 IGG SER-ACNC: <0.2 U
ENA SCL70 IGG SER IA-ACNC: <0.2 U

## 2023-10-24 LAB — PYRIDOXAL SERPL-MCNC: 13 UG/L (ref 5–50)

## 2023-10-25 LAB — VIT B1 BLD-MCNC: 71 UG/L (ref 38–122)

## 2023-11-17 ENCOUNTER — OFFICE VISIT (OUTPATIENT)
Dept: VASCULAR SURGERY | Facility: CLINIC | Age: 58
End: 2023-11-17
Payer: COMMERCIAL

## 2023-11-17 VITALS — HEIGHT: 73 IN | BODY MASS INDEX: 33.6 KG/M2 | WEIGHT: 253.5 LBS

## 2023-11-17 DIAGNOSIS — I83.892 VARICOSE VEINS OF LEFT LEG WITH EDEMA: ICD-10-CM

## 2023-11-17 DIAGNOSIS — I87.2 VENOUS INSUFFICIENCY: Primary | ICD-10-CM

## 2023-11-17 PROCEDURE — 99204 PR OFFICE/OUTPT VISIT, NEW, LEVL IV, 45-59 MIN: ICD-10-PCS | Mod: S$GLB,,, | Performed by: SURGERY

## 2023-11-17 PROCEDURE — 99204 OFFICE O/P NEW MOD 45 MIN: CPT | Mod: S$GLB,,, | Performed by: SURGERY

## 2023-11-17 PROCEDURE — 3052F PR MOST RECENT HEMOGLOBIN A1C LEVEL 8.0 - < 9.0%: ICD-10-PCS | Mod: CPTII,S$GLB,, | Performed by: SURGERY

## 2023-11-17 PROCEDURE — 3008F PR BODY MASS INDEX (BMI) DOCUMENTED: ICD-10-PCS | Mod: CPTII,S$GLB,, | Performed by: SURGERY

## 2023-11-17 PROCEDURE — 1159F PR MEDICATION LIST DOCUMENTED IN MEDICAL RECORD: ICD-10-PCS | Mod: CPTII,S$GLB,, | Performed by: SURGERY

## 2023-11-17 PROCEDURE — 3008F BODY MASS INDEX DOCD: CPT | Mod: CPTII,S$GLB,, | Performed by: SURGERY

## 2023-11-17 PROCEDURE — 3052F HG A1C>EQUAL 8.0%<EQUAL 9.0%: CPT | Mod: CPTII,S$GLB,, | Performed by: SURGERY

## 2023-11-17 PROCEDURE — 1159F MED LIST DOCD IN RCRD: CPT | Mod: CPTII,S$GLB,, | Performed by: SURGERY

## 2023-11-17 NOTE — PROGRESS NOTES
Aryan Atkinson MD, RPVI                                 [facility]                           [facility]                             11/17/2023    HPI:  Ian Cifuentes is a 58 y.o. male with   Patient Active Problem List   Diagnosis    Diabetes mellitus without complication    Mixed hyperlipidemia    Gastroesophageal reflux disease without esophagitis    NEETA (obstructive sleep apnea)    Near syncope    Dyspnea on exertion    Primary osteoarthritis of left knee    Primary osteoarthritis of right knee    Deviated septum    Preop exam for internal medicine    Transient elevated blood pressure    Depression, major, recurrent, mild    Costochondritis    Attention deficit hyperactivity disorder (ADHD), predominantly inattentive type    Erectile dysfunction    being managed by PCP and specialists who is here today for evaluation of LLE edema.  Patient states location is LLE occurring for yrs.  Associated signs and symptoms include varicose veins and pain.  Quality is burning and severity is 6/10.  Symptoms began yrs ago.  Alleviating factors include elevation.  Worsening factors include dependency.  Patient has been wearing compression stockings for greater than 3 months.  Symptoms do limit patient's functional status and daily activities.  no DVT history.  no venous interventions.  no low sodium diet.  no excessive water intake.    Migraine with aura: no  PFO/ASD/right to left shunt: no  Pregnant: no  Breastfeeding: no    no MI  no Stroke  Tobacco use: denies    Past Medical History:   Diagnosis Date    Acid reflux      Past Surgical History:   Procedure Laterality Date    TONSILLECTOMY       Family History   Problem Relation Age of Onset    Diabetes Mother     Diabetes Father     Diabetes Sister     Diabetes Brother      Social History     Socioeconomic History    Marital status:    Tobacco Use    Smoking status: Never    Smokeless tobacco: Never   Substance and Sexual  Activity    Alcohol use: Yes     Alcohol/week: 6.0 standard drinks of alcohol     Types: 6 Cans of beer per week     Comment: <1    Drug use: No    Sexual activity: Not Currently     Partners: Female       Current Outpatient Medications:     buPROPion (WELLBUTRIN XL) 150 MG TB24 tablet, TAKE ONE TABLET BY MOUTH EVERY DAY, Disp: 90 tablet, Rfl: 0    dextroamphetamine-amphetamine (ADDERALL XR) 20 MG 24 hr capsule, Take 1 capsule (20 mg total) by mouth every morning., Disp: 30 capsule, Rfl: 0    EScitalopram oxalate (LEXAPRO) 10 MG tablet, Take 1 tablet (10 mg total) by mouth once daily., Disp: 30 tablet, Rfl: 5    metFORMIN (GLUCOPHAGE-XR) 500 MG ER 24hr tablet, Take 1 tablet (500 mg total) by mouth 2 (two) times daily with meals., Disp: 180 tablet, Rfl: 3    pantoprazole (PROTONIX) 40 MG tablet, Take 1 tablet (40 mg total) by mouth once daily., Disp: 90 tablet, Rfl: 3    rosuvastatin (CRESTOR) 20 MG tablet, Take 1 tablet (20 mg total) by mouth once daily., Disp: 90 tablet, Rfl: 3    meloxicam (MOBIC) 7.5 MG tablet, Take 7.5 mg by mouth once daily., Disp: , Rfl:     REVIEW OF SYSTEMS:  General: No fevers or chills; ENT: No sore throat; Allergy and Immunology: no persistent infections; Hematological and Lymphatic: No history of bleeding or easy bruising; Endocrine: negative; Respiratory: no cough, shortness of breath, or wheezing; Cardiovascular: no chest pain or dyspnea on exertion; Gastrointestinal: no abdominal pain/back, change in bowel habits, or bloody stools; Genito-Urinary: no dysuria, trouble voiding, or hematuria; Musculoskeletal: edema; Neurological: no TIA or stroke symptoms; Psychiatric: no nervousness, anxiety or depression.    PHYSICAL EXAM:                General appearance:  Alert, well-appearing, and in no distress.  Oriented to person, place, and time                    Neurological: Normal speech, no focal findings noted; CN II - XII grossly intact. RLE with sensation to light touch, LLE with  "sensation to light touch.            Musculoskeletal: Digits/nail without cyanosis/clubbing.  Strength 5/5 BLE.                    Neck: Supple, no significant adenopathy                  Chest:  No wheezes, symmetric air entry. No use of accessory muscles               Cardiac: Normal rate and regular rhythm            Abdomen: Soft, nontender, nondistended      Extremities:   2+ R DP pulse, 2+ L DP pulse      1+ RLE edema, 2+ LLE edema    Skin:  RLE no ulcer; LLE no ulcer      RLE no spider veins, LLE + spider veins      RLE no varicose veins, LLE + varicose veins    CEAP 3/3    VCSS 9    LAB RESULTS:  No results found for: "CBC"  No results found for: "LABPROT", "INR"  Lab Results   Component Value Date     06/08/2023    K 4.7 06/08/2023     06/08/2023    CO2 25 06/08/2023     (H) 06/08/2023    BUN 17 06/08/2023    CREATININE 1.0 06/08/2023    CALCIUM 9.1 06/08/2023    ANIONGAP 9 06/08/2023    EGFRNONAA >60.0 01/11/2022     Lab Results   Component Value Date    WBC 5.57 06/08/2023    RBC 5.06 06/08/2023    HGB 16.1 06/08/2023    HCT 47.0 06/08/2023    MCV 93 06/08/2023    MCH 31.8 (H) 06/08/2023    MCHC 34.3 06/08/2023    RDW 12.9 06/08/2023     06/08/2023    MPV 10.6 06/08/2023    GRAN 2.4 06/08/2023    GRAN 43.5 06/08/2023    LYMPH 2.1 06/08/2023    LYMPH 38.2 06/08/2023    MONO 0.6 06/08/2023    MONO 10.4 06/08/2023    EOS 0.4 06/08/2023    BASO 0.05 06/08/2023    EOSINOPHIL 6.6 06/08/2023    BASOPHIL 0.9 06/08/2023    DIFFMETHOD Automated 06/08/2023     .  Lab Results   Component Value Date    HGBA1C 8.2 (H) 09/14/2023       IMAGING:  All pertinent imaging has been reviewed and interpreted independently.    Venous US 10/2023 Impression:     FINDINGS:  Deep venous system:     There is evidence of flow, compressibility and augmentation within bilateral common femoral, femoral, and popliteal veins.  Flow is seen within bilateral peroneal, posterior tibial and anterior tibial veins.   "   Superficial venous system:     Right leg:     There is evidence of reflux lasting longer than 500 ms in the right greater saphenous vein (600 milliseconds involving the greater saphenous vein at the midthigh).     The maximal diameter within the right greater saphenous vein is 10 mm.     The maximal diameter within the right lesser saphenous vein is 5mm.     Left leg:     There is evidence of reflux lasting longer than 500 ms in the left greater saphenous vein (greatest of 4447 milliseconds involving the greater saphenous vein at the distal thigh.  Additionally, 4791 milliseconds involving the lesser saphenous vein at the mid to distal area.     The maximal diameter within the left greater saphenous vein is 11 mm.     The maximal diameter within the left lesser saphenous vein is 4mm.     Impression:     1. There is evidence of hemodynamically significant venous reflux (reflux lasting greater than 500ms) in the bilateral greater saphenous veins and left lesser saphenous vein as detailed above.     2. There is no evidence of bilateral lower extremity deep venous thrombosis.        Electronically signed by: Jan Parry  Date:                                            10/12/2023    IMP/PLAN:  58 y.o. male with   Patient Active Problem List   Diagnosis    Diabetes mellitus without complication    Mixed hyperlipidemia    Gastroesophageal reflux disease without esophagitis    NEETA (obstructive sleep apnea)    Near syncope    Dyspnea on exertion    Primary osteoarthritis of left knee    Primary osteoarthritis of right knee    Deviated septum    Preop exam for internal medicine    Transient elevated blood pressure    Depression, major, recurrent, mild    Costochondritis    Attention deficit hyperactivity disorder (ADHD), predominantly inattentive type    Erectile dysfunction    being managed by PCP and specialists who is here today for evaluation of LLE edema and symptomatic spider and varicose veins.    -rec L GSV  EVLT  -recommend compression with Rx stockings, elevation, dietary changes associated with water and sodium intake discussed at length with patient  -Exercise     I spent 12 minutes evaluating this patient and greater than 50% of the time was spent counseling, coordinator care and discussing the plan of care.  All questions were answered and patient stated understanding with agreement with the above treatment plan.    Aryan Atkinson MD [facility]  Vascular and Endovascular Surgery

## 2023-12-04 ENCOUNTER — LAB VISIT (OUTPATIENT)
Dept: LAB | Facility: HOSPITAL | Age: 58
End: 2023-12-04
Attending: INTERNAL MEDICINE
Payer: COMMERCIAL

## 2023-12-04 ENCOUNTER — OFFICE VISIT (OUTPATIENT)
Dept: PRIMARY CARE CLINIC | Facility: CLINIC | Age: 58
End: 2023-12-04
Payer: COMMERCIAL

## 2023-12-04 VITALS
HEIGHT: 73 IN | SYSTOLIC BLOOD PRESSURE: 138 MMHG | HEART RATE: 63 BPM | DIASTOLIC BLOOD PRESSURE: 80 MMHG | OXYGEN SATURATION: 97 % | RESPIRATION RATE: 18 BRPM | BODY MASS INDEX: 34.04 KG/M2 | TEMPERATURE: 98 F | WEIGHT: 256.81 LBS

## 2023-12-04 DIAGNOSIS — E11.9 DIABETES MELLITUS WITHOUT COMPLICATION: ICD-10-CM

## 2023-12-04 DIAGNOSIS — E78.2 MIXED HYPERLIPIDEMIA: ICD-10-CM

## 2023-12-04 DIAGNOSIS — F33.0 DEPRESSION, MAJOR, RECURRENT, MILD: ICD-10-CM

## 2023-12-04 DIAGNOSIS — E11.9 DIABETES MELLITUS WITHOUT COMPLICATION: Primary | ICD-10-CM

## 2023-12-04 DIAGNOSIS — F90.0 ATTENTION DEFICIT HYPERACTIVITY DISORDER (ADHD), PREDOMINANTLY INATTENTIVE TYPE: ICD-10-CM

## 2023-12-04 LAB
ESTIMATED AVG GLUCOSE: 183 MG/DL (ref 68–131)
HBA1C MFR BLD: 8 % (ref 4–5.6)

## 2023-12-04 PROCEDURE — 3075F PR MOST RECENT SYSTOLIC BLOOD PRESS GE 130-139MM HG: ICD-10-PCS | Mod: CPTII,S$GLB,, | Performed by: INTERNAL MEDICINE

## 2023-12-04 PROCEDURE — 1160F PR REVIEW ALL MEDS BY PRESCRIBER/CLIN PHARMACIST DOCUMENTED: ICD-10-PCS | Mod: CPTII,S$GLB,, | Performed by: INTERNAL MEDICINE

## 2023-12-04 PROCEDURE — 3008F PR BODY MASS INDEX (BMI) DOCUMENTED: ICD-10-PCS | Mod: CPTII,S$GLB,, | Performed by: INTERNAL MEDICINE

## 2023-12-04 PROCEDURE — 3075F SYST BP GE 130 - 139MM HG: CPT | Mod: CPTII,S$GLB,, | Performed by: INTERNAL MEDICINE

## 2023-12-04 PROCEDURE — 3052F HG A1C>EQUAL 8.0%<EQUAL 9.0%: CPT | Mod: CPTII,S$GLB,, | Performed by: INTERNAL MEDICINE

## 2023-12-04 PROCEDURE — 99999 PR PBB SHADOW E&M-EST. PATIENT-LVL IV: ICD-10-PCS | Mod: PBBFAC,,, | Performed by: INTERNAL MEDICINE

## 2023-12-04 PROCEDURE — 99214 PR OFFICE/OUTPT VISIT, EST, LEVL IV, 30-39 MIN: ICD-10-PCS | Mod: S$GLB,,, | Performed by: INTERNAL MEDICINE

## 2023-12-04 PROCEDURE — 36415 COLL VENOUS BLD VENIPUNCTURE: CPT | Performed by: INTERNAL MEDICINE

## 2023-12-04 PROCEDURE — 83036 HEMOGLOBIN GLYCOSYLATED A1C: CPT | Performed by: INTERNAL MEDICINE

## 2023-12-04 PROCEDURE — 99999 PR PBB SHADOW E&M-EST. PATIENT-LVL IV: CPT | Mod: PBBFAC,,, | Performed by: INTERNAL MEDICINE

## 2023-12-04 PROCEDURE — 1159F PR MEDICATION LIST DOCUMENTED IN MEDICAL RECORD: ICD-10-PCS | Mod: CPTII,S$GLB,, | Performed by: INTERNAL MEDICINE

## 2023-12-04 PROCEDURE — 3052F PR MOST RECENT HEMOGLOBIN A1C LEVEL 8.0 - < 9.0%: ICD-10-PCS | Mod: CPTII,S$GLB,, | Performed by: INTERNAL MEDICINE

## 2023-12-04 PROCEDURE — 1160F RVW MEDS BY RX/DR IN RCRD: CPT | Mod: CPTII,S$GLB,, | Performed by: INTERNAL MEDICINE

## 2023-12-04 PROCEDURE — 3079F DIAST BP 80-89 MM HG: CPT | Mod: CPTII,S$GLB,, | Performed by: INTERNAL MEDICINE

## 2023-12-04 PROCEDURE — 99214 OFFICE O/P EST MOD 30 MIN: CPT | Mod: S$GLB,,, | Performed by: INTERNAL MEDICINE

## 2023-12-04 PROCEDURE — 3008F BODY MASS INDEX DOCD: CPT | Mod: CPTII,S$GLB,, | Performed by: INTERNAL MEDICINE

## 2023-12-04 PROCEDURE — 1159F MED LIST DOCD IN RCRD: CPT | Mod: CPTII,S$GLB,, | Performed by: INTERNAL MEDICINE

## 2023-12-04 PROCEDURE — 3079F PR MOST RECENT DIASTOLIC BLOOD PRESSURE 80-89 MM HG: ICD-10-PCS | Mod: CPTII,S$GLB,, | Performed by: INTERNAL MEDICINE

## 2023-12-04 RX ORDER — ESCITALOPRAM OXALATE 20 MG/1
20 TABLET ORAL DAILY
Qty: 30 TABLET | Refills: 5 | Status: SHIPPED | OUTPATIENT
Start: 2023-12-04

## 2023-12-04 NOTE — PROGRESS NOTES
"[facility] Note    Chief Complaint      Chief Complaint   Patient presents with    Follow-up     3m       History of Present Illness      Ian Cifuentes is a 58 y.o. male who presents today for   Chief Complaint   Patient presents with    Follow-up     3m   .  Patient comes to appointment here for 3m checkup for addd and anxiety medication management .  He states his anxiety has been:"creeping up" . He continues with his neck cervical radiculopathy is seeing md at [facility] , but also with his "muscle pain " has been to rheumatology full workup negative to this point and he is scheduled to see pain management .      HPI    No problem-specific Assessment & Plan notes found for this encounter.       Problem List Items Addressed This Visit          Psychiatric    Depression, major, recurrent, mild    Overview     added lexapro to welbutrin . But will increase to 20 mg per patient request will be seeing psychiatrist soon          Attention deficit hyperactivity disorder (ADHD), predominantly inattentive type    Overview     adderall xr 20 mg he will start soon             Cardiac/Vascular    Mixed hyperlipidemia    Overview     Refill crestor cont current             Endocrine    Diabetes mellitus without complication - Primary    Overview      a1c high at 8.2 will increase metormim xl to 1000 mg              Past Medical History:  Past Medical History:   Diagnosis Date    Acid reflux        Past Surgical History:  Past Surgical History:   Procedure Laterality Date    TONSILLECTOMY         Family History:  family history includes Diabetes in his brother, father, mother, and sister.     Social History:  Social History     Socioeconomic History    Marital status:    Tobacco Use    Smoking status: Never    Smokeless tobacco: Never   Substance and Sexual Activity    Alcohol use: Yes     Alcohol/week: 3.0 standard drinks of alcohol     Types: 3 Cans of beer per week     " Comment: <1    Drug use: No    Sexual activity: Not Currently     Partners: Female       Review of Systems:   Review of Systems   Constitutional:  Negative for fever and weight loss.   HENT:  Negative for congestion, hearing loss and sore throat.    Eyes:  Negative for blurred vision.   Respiratory:  Negative for cough and shortness of breath.    Cardiovascular:  Negative for chest pain, palpitations, claudication and leg swelling.   Gastrointestinal:  Negative for abdominal pain, constipation, diarrhea and heartburn.   Genitourinary:  Negative for dysuria.   Musculoskeletal:  Positive for myalgias and neck pain. Negative for back pain.   Skin:  Negative for rash.   Neurological:  Negative for focal weakness and headaches.   Psychiatric/Behavioral:  Negative for depression and suicidal ideas. The patient is not nervous/anxious.         Medications:  Outpatient Encounter Medications as of 12/4/2023   Medication Sig Dispense Refill    buPROPion (WELLBUTRIN XL) 150 MG TB24 tablet TAKE ONE TABLET BY MOUTH EVERY DAY 90 tablet 0    dextroamphetamine-amphetamine (ADDERALL XR) 20 MG 24 hr capsule Take 1 capsule (20 mg total) by mouth every morning. 30 capsule 0    metFORMIN (GLUCOPHAGE-XR) 500 MG ER 24hr tablet Take 1 tablet (500 mg total) by mouth 2 (two) times daily with meals. 180 tablet 3    pantoprazole (PROTONIX) 40 MG tablet Take 1 tablet (40 mg total) by mouth once daily. 90 tablet 3    rosuvastatin (CRESTOR) 20 MG tablet Take 1 tablet (20 mg total) by mouth once daily. 90 tablet 3    [DISCONTINUED] EScitalopram oxalate (LEXAPRO) 10 MG tablet Take 1 tablet (10 mg total) by mouth once daily. 30 tablet 5    EScitalopram oxalate (LEXAPRO) 20 MG tablet Take 1 tablet (20 mg total) by mouth once daily. 30 tablet 5    [DISCONTINUED] meloxicam (MOBIC) 7.5 MG tablet Take 7.5 mg by mouth once daily.       No facility-administered encounter medications on file as of 12/4/2023.       Allergies:  Review of patient's allergies  "indicates:  No Known Allergies      Physical Exam      Vitals:    12/04/23 0932   BP: 138/80   Pulse: 63   Resp: 18   Temp: 98 °F (36.7 °C)        Vital Signs  Temp: 98 °F (36.7 °C)  Temp Source: Oral  Pulse: 63  Resp: 18  SpO2: 97 %  BP: 138/80  BP Location: Right arm  Patient Position: Sitting  Pain Score: 0-No pain  Height and Weight  Height: 6' 1" (185.4 cm)  Weight: 116.5 kg (256 lb 13.4 oz)  BSA (Calculated - sq m): 2.45 sq meters  BMI (Calculated): 33.9  Weight in (lb) to have BMI = 25: 189.1]     Body mass index is 33.89 kg/m².    Physical Exam  Constitutional:       Appearance: He is well-developed.   HENT:      Head: Normocephalic.   Eyes:      Pupils: Pupils are equal, round, and reactive to light.   Neck:      Thyroid: No thyromegaly.   Cardiovascular:      Rate and Rhythm: Normal rate and regular rhythm.      Heart sounds: No murmur heard.     No friction rub. No gallop.   Pulmonary:      Effort: Pulmonary effort is normal.      Breath sounds: Normal breath sounds.   Abdominal:      General: Bowel sounds are normal.      Palpations: Abdomen is soft.   Musculoskeletal:         General: Normal range of motion.      Cervical back: Normal range of motion.   Skin:     General: Skin is warm and dry.   Neurological:      Mental Status: He is alert and oriented to person, place, and time.      Sensory: No sensory deficit.   Psychiatric:         Behavior: Behavior normal.         Thought Content: Thought content does not include homicidal or suicidal ideation.          Laboratory:  CBC:  No results for input(s): "WBC", "RBC", "HGB", "HCT", "PLT", "MCV", "MCH", "MCHC" in the last 2160 hours.  CMP:  No results for input(s): "GLU", "CALCIUM", "ALBUMIN", "PROT", "NA", "K", "CO2", "CL", "BUN", "ALKPHOS", "ALT", "AST", "BILITOT" in the last 2160 hours.    Invalid input(s): "CREATININ"  URINALYSIS:  No results for input(s): "COLORU", "CLARITYU", "SPECGRAV", "PHUR", "PROTEINUA", "GLUCOSEU", "BILIRUBINCON", "BLOODU", " ""WBCU", "RBCU", "BACTERIA", "MUCUS", "NITRITE", "LEUKOCYTESUR", "UROBILINOGEN", "HYALINECASTS" in the last 2160 hours.   LIPIDS:  No results for input(s): "TSH", "HDL", "CHOL", "TRIG", "LDLCALC", "CHOLHDL", "NONHDLCHOL", "TOTALCHOLEST" in the last 2160 hours.  TSH:  No results for input(s): "TSH" in the last 2160 hours.  A1C:  Recent Labs   Lab Result Units 09/14/23  1629   Hemoglobin A1C % 8.2*       Radiology:        Assessment:     Ian Cifuentes is a 58 y.o.male with:    Diabetes mellitus without complication  -     Hemoglobin A1C; Future; Expected date: 12/04/2023    Attention deficit hyperactivity disorder (ADHD), predominantly inattentive type    Mixed hyperlipidemia    Depression, major, recurrent, mild  -     EScitalopram oxalate (LEXAPRO) 20 MG tablet; Take 1 tablet (20 mg total) by mouth once daily.  Dispense: 30 tablet; Refill: 5                Plan:     Problem List Items Addressed This Visit          Psychiatric    Depression, major, recurrent, mild    Overview     added lexapro to welbutrin . But will increase to 20 mg per patient request will be seeing psychiatrist soon          Attention deficit hyperactivity disorder (ADHD), predominantly inattentive type    Overview     adderall xr 20 mg he will start soon             Cardiac/Vascular    Mixed hyperlipidemia    Overview     Refill crestor cont current             Endocrine    Diabetes mellitus without complication - Primary    Overview      a1c high at 8.2 will increase metormim xl to 1000 mg             As above, continue current medications and maintain follow up with specialists.  Return to clinic in 3 months.      Frederick W Dantagnan [facility]                  "

## 2023-12-06 DIAGNOSIS — I87.2 VENOUS INSUFFICIENCY: Primary | ICD-10-CM

## 2023-12-07 ENCOUNTER — TELEPHONE (OUTPATIENT)
Dept: VASCULAR SURGERY | Facility: CLINIC | Age: 58
End: 2023-12-07
Payer: COMMERCIAL

## 2023-12-14 ENCOUNTER — OFFICE VISIT (OUTPATIENT)
Dept: PAIN MEDICINE | Facility: CLINIC | Age: 58
End: 2023-12-14
Payer: COMMERCIAL

## 2023-12-14 VITALS
SYSTOLIC BLOOD PRESSURE: 154 MMHG | DIASTOLIC BLOOD PRESSURE: 91 MMHG | WEIGHT: 256.81 LBS | HEIGHT: 73 IN | HEART RATE: 68 BPM | BODY MASS INDEX: 34.04 KG/M2

## 2023-12-14 DIAGNOSIS — M54.16 LUMBAR RADICULOPATHY: ICD-10-CM

## 2023-12-14 DIAGNOSIS — M54.12 CERVICAL RADICULOPATHY: ICD-10-CM

## 2023-12-14 DIAGNOSIS — M54.51 VERTEBROGENIC LOW BACK PAIN: ICD-10-CM

## 2023-12-14 DIAGNOSIS — M79.7 FIBROMYALGIA: Primary | ICD-10-CM

## 2023-12-14 DIAGNOSIS — M25.50 ARTHRALGIA, UNSPECIFIED JOINT: ICD-10-CM

## 2023-12-14 PROCEDURE — 3052F HG A1C>EQUAL 8.0%<EQUAL 9.0%: CPT | Mod: CPTII,S$GLB,, | Performed by: STUDENT IN AN ORGANIZED HEALTH CARE EDUCATION/TRAINING PROGRAM

## 2023-12-14 PROCEDURE — 3080F PR MOST RECENT DIASTOLIC BLOOD PRESSURE >= 90 MM HG: ICD-10-PCS | Mod: CPTII,S$GLB,, | Performed by: STUDENT IN AN ORGANIZED HEALTH CARE EDUCATION/TRAINING PROGRAM

## 2023-12-14 PROCEDURE — 99999 PR PBB SHADOW E&M-EST. PATIENT-LVL III: CPT | Mod: PBBFAC,,, | Performed by: STUDENT IN AN ORGANIZED HEALTH CARE EDUCATION/TRAINING PROGRAM

## 2023-12-14 PROCEDURE — 3077F PR MOST RECENT SYSTOLIC BLOOD PRESSURE >= 140 MM HG: ICD-10-PCS | Mod: CPTII,S$GLB,, | Performed by: STUDENT IN AN ORGANIZED HEALTH CARE EDUCATION/TRAINING PROGRAM

## 2023-12-14 PROCEDURE — 3008F BODY MASS INDEX DOCD: CPT | Mod: CPTII,S$GLB,, | Performed by: STUDENT IN AN ORGANIZED HEALTH CARE EDUCATION/TRAINING PROGRAM

## 2023-12-14 PROCEDURE — 3077F SYST BP >= 140 MM HG: CPT | Mod: CPTII,S$GLB,, | Performed by: STUDENT IN AN ORGANIZED HEALTH CARE EDUCATION/TRAINING PROGRAM

## 2023-12-14 PROCEDURE — 3080F DIAST BP >= 90 MM HG: CPT | Mod: CPTII,S$GLB,, | Performed by: STUDENT IN AN ORGANIZED HEALTH CARE EDUCATION/TRAINING PROGRAM

## 2023-12-14 PROCEDURE — 99205 PR OFFICE/OUTPT VISIT, NEW, LEVL V, 60-74 MIN: ICD-10-PCS | Mod: S$GLB,,, | Performed by: STUDENT IN AN ORGANIZED HEALTH CARE EDUCATION/TRAINING PROGRAM

## 2023-12-14 PROCEDURE — 3052F PR MOST RECENT HEMOGLOBIN A1C LEVEL 8.0 - < 9.0%: ICD-10-PCS | Mod: CPTII,S$GLB,, | Performed by: STUDENT IN AN ORGANIZED HEALTH CARE EDUCATION/TRAINING PROGRAM

## 2023-12-14 PROCEDURE — 3008F PR BODY MASS INDEX (BMI) DOCUMENTED: ICD-10-PCS | Mod: CPTII,S$GLB,, | Performed by: STUDENT IN AN ORGANIZED HEALTH CARE EDUCATION/TRAINING PROGRAM

## 2023-12-14 PROCEDURE — 99205 OFFICE O/P NEW HI 60 MIN: CPT | Mod: S$GLB,,, | Performed by: STUDENT IN AN ORGANIZED HEALTH CARE EDUCATION/TRAINING PROGRAM

## 2023-12-14 PROCEDURE — 99999 PR PBB SHADOW E&M-EST. PATIENT-LVL III: ICD-10-PCS | Mod: PBBFAC,,, | Performed by: STUDENT IN AN ORGANIZED HEALTH CARE EDUCATION/TRAINING PROGRAM

## 2023-12-14 RX ORDER — CYCLOBENZAPRINE HCL 5 MG
5 TABLET ORAL NIGHTLY PRN
Qty: 90 TABLET | Refills: 0 | Status: SHIPPED | OUTPATIENT
Start: 2023-12-14 | End: 2024-03-13

## 2023-12-14 NOTE — PROGRESS NOTES
Chronic Pain - New Consult    Referring Physician: Smitha Paris MD    Date: 12/14/2023     Re: Ian Cifuentes  MR#: [id number]  YOB: 1965  Age: 58 y.o.    Chief Complaint:   Chief Complaint   Patient presents with    Arm Pain    Back Pain    Muscle Pain    Leg Pain     **This note is dictated using the M*Modal Fluency Direct word recognition program. There are word recognition mistakes that are occasionally missed on review.**    ASSESSMENT: 58 y.o. year old male with body pain, consistent with     1. Vertebrogenic low back pain        2. Cervical radiculopathy  Ambulatory referral/consult to Pain Clinic      3. Lumbar radiculopathy  Ambulatory referral/consult to Pain Clinic        PLAN:     Full body pain with arthralgia, myalgias, back pain, neck pain  -unclear etiology. Rheumatology and neurology work-up negative.  Patient has been following with Dr. Moe at [facility] back and spine.  He has had a cervical transforaminal injection without improvement.  He has diffuse full body pains that rotate around different joints and body parts.  The worst consistent pains are located in the neck and low back.  -could be chemical exposures while in the  or at work  -meets criteria for fibromyalgia  -failed gabapentin and Lyrica  -recommended functional restoration program, however, he cannot working to schedule at this time   -trial Flexeril 5 mg  -proceed with cervical medial branch block as scheduled with Dr. Moe  -physical exam was generally unremarkable aside from TTP  -He is very tight and clearly has some deconditioning.  He would benefit from a consistent exercise program. PT referral placed      - RTC 3 months  - Counseled patient regarding the importance of weight loss and activity modification, constant sleeping habits, and physical therapy.    The above plan and management options were discussed at length with patient. Patient is in agreement with the above and verbalized  "understanding. It will be communicated with the referring physician via electronic record, fax, or mail.  Lab/study reports reviewed were important and necessary because subsequent medical and treatment recommendations required review of the above lab/study reports. Images viewed/reviewed above were important and necessary because subsequent medical and treatment recommendations required review of the reviewed image(s).     Electronically signed by:  Krishna Garcia DO  12/14/2023    Patient was seen in clinic today.  The total amount of time spent on this patient was greater than 1 hour.  Due to medical complexity and multiple issues discussed/addressed I am billing for a level 5 visit. This includes face to face time and non-face to face time preparing to see the patient by reviewing previous labs/imaging, obtaining and/or reviewing separately obtained history, documenting clinical information in the electronic or other health record, independently reviewing results and communicating results to the patient/family/caregiver.  The available imaging was reviewed in person with the patient, pathology and treatment options were explained in detail with the patient.  The patient was given multiple opportunities to ask questions, and all questions were answered.  =========================================================================================================    SUBJECTIVE:    Ian Cifuentes is a 58 y.o. male presents to the clinic for the evaluation of body pain. The pain started 5 years ago following no inciting event and symptoms have been worsening.  He is following with Dr. Moe at [facility]. He is scheduled for a right C2-3 MBB on 1/10/24.  He has been seeing rheumatology and neurology for full body pain that     Back pain has some radiation down the left leg.  Back pain = leg pain.  Back pain is worse with sitting for > 30 minutes.  He also gets "shocks" in both arms with " raising his arm above shoulder height.    He does not do any structured exercise program.  When he started seeing [facility], [facility]      He also has a lot of joint pains. In the wrists, knees, ankles.  These occur spontaneously and last for a short period of time and then go away.  They happen at random times throughout the day. Repetitive movements are bad.     Pain Description:    The pain is located in the Body and muscular / whole body area and radiates to the upper body to lower body .    At BEST  3/10   At WORST  7/10 on the WORST day.    On average pain is rated as 3/10.   Today the pain is rated as 3/10  The pain is continuous.  The pain is described as aching, throbbing, and tingling    Symptoms interfere with daily activity.   Exacerbating factors: Sitting and Laying.    Mitigating factors nothing.   He reports 5 hours of sleep per night. Broken sleep due to pain.    Physical Therapy/Home Exercise: No, not currently in physical therapy or home exercise program    Current Pain Medications:    - wellbutrin    Failed Pain Medications:    - gabapentin (balance, dizziness), lyrica (balance, dizziness), meloxicam, ibuprofen, tylenol    Pain Treatment Therapies:    Pain procedures:   09/2023 - Left C6 SARAH w/ Dr. Moe  09/2023 - Left S1 SARAH w/Dr. Moe  Physical Therapy: not recently  Chiropractor:  Not in a few years  Acupuncture: none  TENS unit: none  Spinal decompression: none  Joint replacement: none    Patient denies urinary incontinence and bowel incontinence.  Patient denies any suicidal or homicidal ideations     report:  Reviewed and consistent with medication use as prescribed.    Imaging:   Cervical MRI 11/2023:    FINDINGS  No acute bony abnormality is identified.  There is chronic straightening of the cervical lordosis.    There is no cerebellar tonsil ectopia.  The spinal cord is normal in caliber and signal.  The disc spaces are partially  desiccated multiple levels.    The patient has chronic, stable disc herniations at C3-C4, C5-C6 and C6-C7.  There is flattening of the cord contour at C5-C6, similar to prior study.  There is no central canal stenosis.    There is chronic asymmetry involving the right C2-C3 facet joint.  Bone edema and periarticular soft tissue edema is evident most notably on sagittal stir imaging.    SPECT scan findings:    There is moderately intense localized increased pharmaceutical activity involving the right C2-C3 facet joint.  The facet by CT is severely narrowed with periarticular sclerosis and facet hypertrophic bone formation.  No abnormality on bone scan imaging is identified elsewhere.     MRI lumbar 11/2023:    MRI findings:    No interval disc herniation or canal stenosis has developed.    There is chronic disc dessication with disc space narrowing most notably involving L5-S1.    No conus medullaris mass is identified.  The spinal cord terminates at the T12 level.    T12-L1:  Stable, posterior midline minimal disc bulge with annular fissure.    L1-L2:  Stable, posterior midline annular fissure.    L2-L3:  Chronic, broad-based posterior 2.1-mm disc herniation with annular fissure. Stable    L3-L4: The spinal canal and neural foramen are patent.  There is no disc bulge or herniation.  The disc is hydrated without loss of height.    L4-L5: The spinal canal and neural foramen are patent.  There is no disc bulge or herniation.  The disc is hydrated without loss of height.    L5-S1:  A chronic, broad-based posterior central 3.2 mm disc herniation is identified.  There is mild flattening of the thecal sac contour.  There is no canal stenosis.    SPECT scan findings:     There is a small focus of moderately intense increased activity across the anterior inferior aspect of the T12 vertebral body.  This approaches the endplate surface.  No abnormality of the lumbar spine is identified.    Impression:    Stable, chronic  findings compared to previous exam 08/21/2023.    There is a small focus of localized increased pharmaceutical activity involving the anterior inferior aspect of the T12 segment with extension to the endplates surface.  No bone scan abnormality of the lumbar spine identified.           12/14/2023     8:18 AM   Pain Disability Index (PDI)   Family/Home Responsibilities: 3   Recreation: 3   Occupation: 3   Sexual Behavior: 3   Self Care: 3   Life-Support Activities: 3   Pain Disability Index (PDI) 21        Past Medical History:   Diagnosis Date    Acid reflux      Past Surgical History:   Procedure Laterality Date    TONSILLECTOMY       Social History     Socioeconomic History    Marital status:    Tobacco Use    Smoking status: Never    Smokeless tobacco: Never   Substance and Sexual Activity    Alcohol use: Yes     Alcohol/week: 3.0 standard drinks of alcohol     Types: 3 Cans of beer per week     Comment: <1    Drug use: No    Sexual activity: Not Currently     Partners: Female     Family History   Problem Relation Age of Onset    Diabetes Mother     Diabetes Father     Diabetes Sister     Diabetes Brother        Review of patient's allergies indicates:  No Known Allergies    Current Outpatient Medications   Medication Sig    buPROPion (WELLBUTRIN XL) 150 MG TB24 tablet TAKE ONE TABLET BY MOUTH EVERY DAY    dextroamphetamine-amphetamine (ADDERALL XR) 20 MG 24 hr capsule Take 1 capsule (20 mg total) by mouth every morning.    EScitalopram oxalate (LEXAPRO) 20 MG tablet Take 1 tablet (20 mg total) by mouth once daily.    metFORMIN (GLUCOPHAGE-XR) 500 MG ER 24hr tablet Take 1 tablet (500 mg total) by mouth 2 (two) times daily with meals.    pantoprazole (PROTONIX) 40 MG tablet Take 1 tablet (40 mg total) by mouth once daily.    rosuvastatin (CRESTOR) 20 MG tablet Take 1 tablet (20 mg total) by mouth once daily.     No current facility-administered medications for this visit.       REVIEW OF  "SYSTEMS:    GENERAL:  No weight loss, malaise or fevers.: No  HEENT:   No recent changes in vision or hearing:NO  NECK:  Negative for lumps, no difficulty with swallowing.NO  RESPIRATORY:  Negative for cough, wheezing or shortness of breath, patient denies any recent URI.:NO  CARDIOVASCULAR:  Negative for chest pain, leg swelling or palpitations.YES CHEST PAIN  GI:  Negative for abdominal discomfort, blood in stools or black stools or change in bowel habits.:NO  MUSCULOSKELETAL:  See HPI.  SKIN:  Negative for lesions, rash, and itching.NO  PSYCH:  No mood disorder or recent psychosocial stressors.  Patients sleep is not disturbed secondary to pain.:YES  HEMATOLOGY/LYMPHOLOGY:  Negative for prolonged bleeding, bruising easily or swollen nodes.  Patient is not currently taking any anti-coagulants:NO  NEURO:   No history of headaches, syncope, paralysis, seizures or tremors.:YES HEADACHES  All other reviewed and negative other than HPI.    OBJECTIVE:    BP (!) 154/91 (BP Location: Right arm, Patient Position: Sitting)   Pulse 68   Ht 6' 1" (1.854 m)   Wt 116.5 kg (256 lb 13.4 oz)   BMI 33.89 kg/m²     PHYSICAL EXAMINATION:    GENERAL: Well appearing, in no acute distress, alert and oriented x3.  PSYCH:  Mood and affect appropriate.  SKIN: Skin color, texture, turgor normal, no rashes or lesions.  HEAD/FACE:  Normocephalic, atraumatic. Cranial nerves grossly intact.    NECK:   - Positive pain to palpation over the cervical paraspinous muscles.   - Spurling  Negative.  - Positive pain with neck extension    CV: RRR with palpation of the radial artery.  PULM: CTAB. No evidence of respiratory difficulty, symmetric chest rise.  GI:  Soft and non-tender.    BACK:   - No obvious deformity or signs of trauma, Normal lumbar lordotic curve  - Negative spinous process tenderness  - Positive paravertebral tenderness  - Negative pain to palpation over the facet joints of the lumbar spine.   - Negative QL / Iliac crest / Glut " tenderness  - Slump test is Negative for radicular pain  - Slump test is Negative for back pain  - Supine Straight leg raising is Negative for radicular pain  - Supine Straight leg raising is Negative for back pain  - Lumbar ROM is normal in Flexion without pain  - Lumbar ROM is normal in Extension with pain  - Lumbar ROM is normal in Lateral Flexion with pain  - Negative Sustained Hip Flexion test (for discogenic pain)  - Negative Altered Gait, Posture  - Axial facet loading test Negative on the bilateral side(s)    SI Joint exam:  - Negative SI joint tenderness to palpation  - Yassine's sign Negative  - Yeoman's Test: Did not perform for SI joint pain indicating anterior SI ligament involvement. Did not perform for anterior thigh pain/paresthesia which indicates femoral nerve stretch.  - Gaenslen's Test:Negative  - Finger Gio's Sign:Negative  - SI compression test:Negative  - SI distraction test:Negative  - Thigh Thrust: Negative  - SI Thrust: Did not perform    MUSKULOSKELETAL:    EXTREMITIES:   Hip Exam:  - Log Roll Negative  - FADIR Negative  - Stinchfield Unable to Perform  - Hip Scour Did not perform  - GTB Tenderness Negative    MUSCULOSKELETAL:  No atrophy or tone abnormalities are noted in the UE or LE.  No deformities, edema, or skin discoloration are noted on visible skin. Good capillary refill.    NEURO: Bilateral upper and lower extremity coordination and muscle stretch reflexes are physiologic and symmetric.      NEUROLOGICAL EXAM:  MENTAL STATUS: A x O x 3, good concentration, speech is fluent and goal directed  MEMORY: recent and remote are intact  CN: CN2-12 grossly intact  MOTOR: 5/5 in all muscle groups  DTRs: 1+ intact symmetric  Sensation:    -no Loss of sensation in a left upper, left lower, right upper, and right lower  arms and legs  distribution.  Babinski: absent on the bilateral side(s)  Childress: absent on the bilateral side(s)  Clonus: absent on the bilateral side(s)    GAIT:  normal.

## 2023-12-15 DIAGNOSIS — F33.0 DEPRESSION, MAJOR, RECURRENT, MILD: ICD-10-CM

## 2023-12-15 RX ORDER — BUPROPION HYDROCHLORIDE 150 MG/1
150 TABLET ORAL
Qty: 90 TABLET | Refills: 3 | Status: SHIPPED | OUTPATIENT
Start: 2023-12-15

## 2023-12-15 NOTE — TELEPHONE ENCOUNTER
Refill Routing Note   Medication(s) are not appropriate for processing by [facility] for the following reason(s):        Required vitals abnormal    [facility] action(s):  Defer               Appointments  past 12m or future 3m with PCP    Date Provider   Last Visit   12/4/2023 Elias Garcia MD   Next Visit   3/5/2024 Elias Garcia MD   ED visits in past 90 days: 0        Note composed:12:55 PM 12/15/2023

## 2023-12-15 NOTE — TELEPHONE ENCOUNTER
No care due was identified.  [facility] Embedded Care Due Messages. Reference number: [id number].   12/15/2023 8:34:14 AM CST

## 2023-12-19 ENCOUNTER — TELEPHONE (OUTPATIENT)
Dept: UROLOGY | Facility: CLINIC | Age: 58
End: 2023-12-19
Payer: COMMERCIAL

## 2023-12-19 DIAGNOSIS — F41.9 ANXIETY DUE TO INVASIVE PROCEDURE: Primary | ICD-10-CM

## 2023-12-19 DIAGNOSIS — Z98.890 STATUS POST LASER ABLATION OF INCOMPETENT VEIN: Primary | ICD-10-CM

## 2023-12-20 RX ORDER — ALPRAZOLAM 0.5 MG/1
TABLET ORAL
Qty: 2 TABLET | Refills: 0 | Status: SHIPPED | OUTPATIENT
Start: 2023-12-20

## 2023-12-21 ENCOUNTER — PROCEDURE VISIT (OUTPATIENT)
Dept: VASCULAR SURGERY | Facility: CLINIC | Age: 58
End: 2023-12-21
Payer: COMMERCIAL

## 2023-12-21 DIAGNOSIS — G89.18 PAIN ASSOCIATED WITH SURGICAL PROCEDURE: Primary | ICD-10-CM

## 2023-12-21 DIAGNOSIS — I87.2 VENOUS INSUFFICIENCY: ICD-10-CM

## 2023-12-21 PROCEDURE — 36478 ENDOVENOUS LASER 1ST VEIN: CPT | Mod: LT,S$GLB,, | Performed by: SURGERY

## 2023-12-21 PROCEDURE — 36478 PR ENDOVENOUS LASER, 1ST VEIN: ICD-10-PCS | Mod: LT,S$GLB,, | Performed by: SURGERY

## 2023-12-21 RX ORDER — LIDOCAINE HYDROCHLORIDE 10 MG/ML
1 INJECTION INFILTRATION; PERINEURAL
Status: SHIPPED | OUTPATIENT
Start: 2023-12-21

## 2023-12-21 NOTE — PROCEDURES
Ian Boswell Vane  12/21/2023     Pre-Procedure Diagnosis: Left greater saphenous vein reflux; significant superficial venous insufficiency    Post-Procedure Diagnsosis: Same    Procedure: Laser endovenous ablation of the left greater saphenous vein    Surgeon: Aryan Atkinson MD    Anesthesia: Local    The skin of the leg was prepped and draped in sterile fashion.  Ultrasound-guidance was used throughout the procedure with a portable duplex ultrasound machine.  The GSV was cannulated using a micro-puncture system.  An 0.035 wire J-tip followed by a 4-Fr Angiodynamics sheath was placed throughout the GSV.   Using tumescent anesthesia, the entire sheathed portion of the GSV was anesthesized keeping the vein at least one cm from the skin; Klein pump was used.  Position of the tip of the laser was then reconfirmed to be approximately 2 cm from the saphenofemoral junction.  The 1470 nm laser was then activated and the entire length of the vein was treated at ~ 49 J/cm.  The fiber and sheath were then removed intact.  Duplex confirmed occlusion of the GSV with continued patency of the common femoral vein.   The incision was closed with Steri-strips.   Sterile compression dressings and a compression stocking were applied and the patient was discharged to home in a satisfactory condition.    Cannulation site: calf    Sheath length: 49 cm    Adams of power: 7 W    Laser time: 325 sec    Joules: 2274 J             Aryan Atkinson MD   Vascular & Endovascular Surgery

## 2023-12-27 ENCOUNTER — HOSPITAL ENCOUNTER (OUTPATIENT)
Dept: VASCULAR SURGERY | Facility: CLINIC | Age: 58
Discharge: HOME | End: 2023-12-27
Attending: SURGERY
Payer: COMMERCIAL

## 2023-12-27 DIAGNOSIS — Z98.890 STATUS POST LASER ABLATION OF INCOMPETENT VEIN: ICD-10-CM

## 2023-12-27 PROCEDURE — 93971 EXTREMITY STUDY: CPT | Mod: S$GLB,,, | Performed by: SURGERY

## 2023-12-27 PROCEDURE — 93971 PR US DUPLEX, UPPER OR LOWER EXT VENOUS,UNILAT OR LTD: ICD-10-PCS | Mod: S$GLB,,, | Performed by: SURGERY

## 2024-01-24 NOTE — PROGRESS NOTES
A1c is a little better than last check . Still mot back to goal . How is he taking his metformin currently

## 2024-03-05 ENCOUNTER — OFFICE VISIT (OUTPATIENT)
Dept: PRIMARY CARE CLINIC | Facility: CLINIC | Age: 59
End: 2024-03-05
Payer: COMMERCIAL

## 2024-03-05 ENCOUNTER — LAB VISIT (OUTPATIENT)
Dept: LAB | Facility: HOSPITAL | Age: 59
End: 2024-03-05
Attending: INTERNAL MEDICINE
Payer: COMMERCIAL

## 2024-03-05 VITALS
HEIGHT: 73 IN | BODY MASS INDEX: 35.24 KG/M2 | SYSTOLIC BLOOD PRESSURE: 134 MMHG | TEMPERATURE: 98 F | OXYGEN SATURATION: 96 % | HEART RATE: 64 BPM | RESPIRATION RATE: 18 BRPM | DIASTOLIC BLOOD PRESSURE: 82 MMHG | WEIGHT: 265.88 LBS

## 2024-03-05 DIAGNOSIS — F90.0 ATTENTION DEFICIT HYPERACTIVITY DISORDER (ADHD), PREDOMINANTLY INATTENTIVE TYPE: ICD-10-CM

## 2024-03-05 DIAGNOSIS — E78.2 MIXED HYPERLIPIDEMIA: ICD-10-CM

## 2024-03-05 DIAGNOSIS — E11.9 DIABETES MELLITUS WITHOUT COMPLICATION: Primary | ICD-10-CM

## 2024-03-05 DIAGNOSIS — F33.0 DEPRESSION, MAJOR, RECURRENT, MILD: ICD-10-CM

## 2024-03-05 DIAGNOSIS — E11.9 DIABETES MELLITUS WITHOUT COMPLICATION: ICD-10-CM

## 2024-03-05 DIAGNOSIS — G47.33 OSA (OBSTRUCTIVE SLEEP APNEA): ICD-10-CM

## 2024-03-05 DIAGNOSIS — G47.09 OTHER INSOMNIA: ICD-10-CM

## 2024-03-05 LAB
ALBUMIN SERPL BCP-MCNC: 3.8 G/DL (ref 3.5–5.2)
ALP SERPL-CCNC: 79 U/L (ref 55–135)
ALT SERPL W/O P-5'-P-CCNC: 30 U/L (ref 10–44)
ANION GAP SERPL CALC-SCNC: 7 MMOL/L (ref 8–16)
AST SERPL-CCNC: 26 U/L (ref 10–40)
BILIRUB SERPL-MCNC: 0.5 MG/DL (ref 0.1–1)
BUN SERPL-MCNC: 14 MG/DL (ref 6–20)
CALCIUM SERPL-MCNC: 8.6 MG/DL (ref 8.7–10.5)
CHLORIDE SERPL-SCNC: 104 MMOL/L (ref 95–110)
CHOLEST SERPL-MCNC: 149 MG/DL (ref 120–199)
CHOLEST/HDLC SERPL: 3.9 {RATIO} (ref 2–5)
CO2 SERPL-SCNC: 29 MMOL/L (ref 23–29)
CREAT SERPL-MCNC: 0.9 MG/DL (ref 0.5–1.4)
EST. GFR  (NO RACE VARIABLE): >60 ML/MIN/1.73 M^2
ESTIMATED AVG GLUCOSE: 197 MG/DL (ref 68–131)
GLUCOSE SERPL-MCNC: 272 MG/DL (ref 70–110)
HBA1C MFR BLD: 8.5 % (ref 4–5.6)
HDLC SERPL-MCNC: 38 MG/DL (ref 40–75)
HDLC SERPL: 25.5 % (ref 20–50)
LDLC SERPL CALC-MCNC: 86.4 MG/DL (ref 63–159)
NONHDLC SERPL-MCNC: 111 MG/DL
POTASSIUM SERPL-SCNC: 4.3 MMOL/L (ref 3.5–5.1)
PROT SERPL-MCNC: 6.6 G/DL (ref 6–8.4)
SODIUM SERPL-SCNC: 140 MMOL/L (ref 136–145)
TRIGL SERPL-MCNC: 123 MG/DL (ref 30–150)

## 2024-03-05 PROCEDURE — 99999 PR PBB SHADOW E&M-EST. PATIENT-LVL V: CPT | Mod: PBBFAC,,, | Performed by: INTERNAL MEDICINE

## 2024-03-05 PROCEDURE — 3079F DIAST BP 80-89 MM HG: CPT | Mod: CPTII,S$GLB,, | Performed by: INTERNAL MEDICINE

## 2024-03-05 PROCEDURE — 3075F SYST BP GE 130 - 139MM HG: CPT | Mod: CPTII,S$GLB,, | Performed by: INTERNAL MEDICINE

## 2024-03-05 PROCEDURE — 36415 COLL VENOUS BLD VENIPUNCTURE: CPT | Performed by: INTERNAL MEDICINE

## 2024-03-05 PROCEDURE — 80061 LIPID PANEL: CPT | Performed by: INTERNAL MEDICINE

## 2024-03-05 PROCEDURE — 80053 COMPREHEN METABOLIC PANEL: CPT | Performed by: INTERNAL MEDICINE

## 2024-03-05 PROCEDURE — 3008F BODY MASS INDEX DOCD: CPT | Mod: CPTII,S$GLB,, | Performed by: INTERNAL MEDICINE

## 2024-03-05 PROCEDURE — 83036 HEMOGLOBIN GLYCOSYLATED A1C: CPT | Performed by: INTERNAL MEDICINE

## 2024-03-05 PROCEDURE — 99214 OFFICE O/P EST MOD 30 MIN: CPT | Mod: S$GLB,,, | Performed by: INTERNAL MEDICINE

## 2024-03-05 PROCEDURE — 1160F RVW MEDS BY RX/DR IN RCRD: CPT | Mod: CPTII,S$GLB,, | Performed by: INTERNAL MEDICINE

## 2024-03-05 PROCEDURE — 1159F MED LIST DOCD IN RCRD: CPT | Mod: CPTII,S$GLB,, | Performed by: INTERNAL MEDICINE

## 2024-03-05 RX ORDER — TRAZODONE HYDROCHLORIDE 100 MG/1
100 TABLET ORAL NIGHTLY
Qty: 30 TABLET | Refills: 3 | Status: SHIPPED | OUTPATIENT
Start: 2024-03-05 | End: 2025-03-05

## 2024-03-05 RX ORDER — DEXTROAMPHETAMINE SACCHARATE, AMPHETAMINE ASPARTATE MONOHYDRATE, DEXTROAMPHETAMINE SULFATE AND AMPHETAMINE SULFATE 5; 5; 5; 5 MG/1; MG/1; MG/1; MG/1
20 CAPSULE, EXTENDED RELEASE ORAL EVERY MORNING
Qty: 30 CAPSULE | Refills: 0 | Status: SHIPPED | OUTPATIENT
Start: 2024-03-05

## 2024-03-05 NOTE — PROGRESS NOTES
[facility] Note    Chief Complaint      Chief Complaint   Patient presents with    Follow-up     3m       History of Present Illness      Ian Cifuentes is a 59 y.o. male who presents today for   Chief Complaint   Patient presents with    Follow-up     3m   .  Patient comes to appointment here for 3 m checkup for chronic issues as below as well as for add and anxiety medication management check . He is doing well with his current regimen .  reviewed today . He is currently not taking adderall . He has stopped taling but notes he has been having some issues with focus but will like to restart .    HPI    No problem-specific Assessment & Plan notes found for this encounter.       Problem List Items Addressed This Visit          Psychiatric    Depression, major, recurrent, mild    Overview     Cont lexapro and welbutrin .  Psych consulted but never has scheduled . He would like referral          Attention deficit hyperactivity disorder (ADHD), predominantly inattentive type    Overview     Restart adderall xr 20 mg see hpi  reviewed             Cardiac/Vascular    Mixed hyperlipidemia    Overview      crestor tolerating cont current             Endocrine    Diabetes mellitus without complication - Primary    Overview      a1c high at 8.0 ,he is compliant with meds . Has 2 steroid injections just prior to labs . Will repeat A1c .            Other    NEETA (obstructive sleep apnea)    Overview     Has not restarted cpap as he just had ent procedure . Dr kapoor is managing          Other insomnia    Overview     Will add trazodone 100 m g             Past Medical History:  Past Medical History:   Diagnosis Date    Acid reflux        Past Surgical History:  Past Surgical History:   Procedure Laterality Date    TONSILLECTOMY         Family History:  family history includes Diabetes in his brother, father, mother, and sister.     Social History:  Social History     Socioeconomic History     Marital status:    Tobacco Use    Smoking status: Never    Smokeless tobacco: Never   Substance and Sexual Activity    Alcohol use: Yes     Alcohol/week: 3.0 standard drinks of alcohol     Types: 3 Cans of beer per week     Comment: <1    Drug use: No    Sexual activity: Not Currently     Partners: Female       Review of Systems:   Review of Systems   Constitutional:  Negative for fever and weight loss.   HENT:  Negative for congestion, hearing loss and sore throat.    Eyes:  Negative for blurred vision.   Respiratory:  Negative for cough and shortness of breath.    Cardiovascular:  Negative for chest pain, palpitations, claudication and leg swelling.   Gastrointestinal:  Negative for abdominal pain, constipation, diarrhea and heartburn.   Genitourinary:  Negative for dysuria.   Musculoskeletal:  Negative for back pain and myalgias.   Skin:  Negative for rash.   Neurological:  Negative for focal weakness and headaches.   Psychiatric/Behavioral:  Negative for depression, memory loss and suicidal ideas. The patient is nervous/anxious.         Medications:  Outpatient Encounter Medications as of 3/5/2024   Medication Sig Dispense Refill    ALPRAZolam (XANAX) 0.5 MG tablet Take one tablet by mouth 1 hour before procedure and bring other tablet with you to the procedure. 2 tablet 0    buPROPion (WELLBUTRIN XL) 150 MG TB24 tablet TAKE ONE TABLET BY MOUTH EVERY DAY 90 tablet 3    cyclobenzaprine (FLEXERIL) 5 MG tablet Take 1 tablet (5 mg total) by mouth nightly as needed for Muscle spasms. 90 tablet 0    EScitalopram oxalate (LEXAPRO) 20 MG tablet Take 1 tablet (20 mg total) by mouth once daily. 30 tablet 5    metFORMIN (GLUCOPHAGE-XR) 500 MG ER 24hr tablet Take 1 tablet (500 mg total) by mouth 2 (two) times daily with meals. 180 tablet 3    pantoprazole (PROTONIX) 40 MG tablet Take 1 tablet (40 mg total) by mouth once daily. 90 tablet 3    rosuvastatin (CRESTOR) 20 MG tablet Take 1 tablet (20 mg total) by mouth  "once daily. 90 tablet 3    [DISCONTINUED] dextroamphetamine-amphetamine (ADDERALL XR) 20 MG 24 hr capsule Take 1 capsule (20 mg total) by mouth every morning. 30 capsule 0    dextroamphetamine-amphetamine (ADDERALL XR) 20 MG 24 hr capsule Take 1 capsule (20 mg total) by mouth every morning. 30 capsule 0    traZODone (DESYREL) 100 MG tablet Take 1 tablet (100 mg total) by mouth every evening. 30 tablet 3     Facility-Administered Encounter Medications as of 3/5/2024   Medication Dose Route Frequency Provider Last Rate Last Admin    LIDOcaine HCL 10 mg/ml (1%) injection 1 mL  1 mL Other 1 time in Clinic/HOD Aryan Atkinson MD        LIDOcaine-EPINEPHrine 1%-1:100,000 30 mL, LIDOcaine HCL 10 mg/ml (1%) 20 mL, sodium bicarbonate 10 mL in sodium chloride 0.9% 500 mL solution   MISCELLANEOUS 1 time in Clinic/HOD Aryan Atkinson MD           Allergies:  Review of patient's allergies indicates:  No Known Allergies      Physical Exam      Vitals:    03/05/24 0856   BP: 134/82   Pulse:    Resp:    Temp:         Vital Signs  Temp: 98 °F (36.7 °C)  Temp Source: Oral  Pulse: 64  Resp: 18  SpO2: 96 %  BP: 134/82  BP Location: Right arm  Patient Position: Sitting  Pain Score: 0-No pain  Height and Weight  Height: 6' 1" (185.4 cm)  Weight: 120.6 kg (265 lb 14 oz)  BSA (Calculated - sq m): 2.49 sq meters  BMI (Calculated): 35.1  Weight in (lb) to have BMI = 25: 189.1]     Body mass index is 35.08 kg/m².    Physical Exam  Constitutional:       Appearance: He is well-developed.   HENT:      Head: Normocephalic.   Eyes:      Pupils: Pupils are equal, round, and reactive to light.   Neck:      Thyroid: No thyromegaly.   Cardiovascular:      Rate and Rhythm: Normal rate and regular rhythm.      Heart sounds: No murmur heard.     No friction rub. No gallop.   Pulmonary:      Effort: Pulmonary effort is normal.      Breath sounds: Normal breath sounds.   Abdominal:      General: Bowel sounds are normal.      Palpations: Abdomen " "is soft.   Musculoskeletal:         General: Normal range of motion.      Cervical back: Normal range of motion.   Skin:     General: Skin is warm and dry.   Neurological:      Mental Status: He is alert and oriented to person, place, and time.      Sensory: No sensory deficit.   Psychiatric:         Behavior: Behavior normal.          Laboratory:  CBC:  No results for input(s): "WBC", "RBC", "HGB", "HCT", "PLT", "MCV", "MCH", "MCHC" in the last 2160 hours.  CMP:  No results for input(s): "GLU", "CALCIUM", "ALBUMIN", "PROT", "NA", "K", "CO2", "CL", "BUN", "ALKPHOS", "ALT", "AST", "BILITOT" in the last 2160 hours.    Invalid input(s): "CREATININ"  URINALYSIS:  No results for input(s): "COLORU", "CLARITYU", "SPECGRAV", "PHUR", "PROTEINUA", "GLUCOSEU", "BILIRUBINCON", "BLOODU", "WBCU", "RBCU", "BACTERIA", "MUCUS", "NITRITE", "LEUKOCYTESUR", "UROBILINOGEN", "HYALINECASTS" in the last 2160 hours.   LIPIDS:  No results for input(s): "TSH", "HDL", "CHOL", "TRIG", "LDLCALC", "CHOLHDL", "NONHDLCHOL", "TOTALCHOLEST" in the last 2160 hours.  TSH:  No results for input(s): "TSH" in the last 2160 hours.  A1C:  No results for input(s): "HGBA1C" in the last 2160 hours.    Radiology:        Assessment:     Ian Cifuentes is a 59 y.o.male with:    Diabetes mellitus without complication  -     Hemoglobin A1C; Future; Expected date: 03/05/2024    Attention deficit hyperactivity disorder (ADHD), predominantly inattentive type  -     Ambulatory referral/consult to Psychiatry; Future; Expected date: 03/12/2024  -     dextroamphetamine-amphetamine (ADDERALL XR) 20 MG 24 hr capsule; Take 1 capsule (20 mg total) by mouth every morning.  Dispense: 30 capsule; Refill: 0    Mixed hyperlipidemia  -     Comprehensive Metabolic Panel; Future; Expected date: 03/05/2024  -     Lipid Panel; Future; Expected date: 03/05/2024    NEETA (obstructive sleep apnea)    Depression, major, recurrent, mild  -     Ambulatory referral/consult to Psychiatry; " Future; Expected date: 03/12/2024    Other insomnia  -     traZODone (DESYREL) 100 MG tablet; Take 1 tablet (100 mg total) by mouth every evening.  Dispense: 30 tablet; Refill: 3                Plan:     Problem List Items Addressed This Visit          Psychiatric    Depression, major, recurrent, mild    Overview     Cont lexapro and welbutrin .  Psych consulted but never has scheduled . He would like referral          Attention deficit hyperactivity disorder (ADHD), predominantly inattentive type    Overview     Restart adderall xr 20 mg see hpi  reviewed             Cardiac/Vascular    Mixed hyperlipidemia    Overview      crestor tolerating cont current             Endocrine    Diabetes mellitus without complication - Primary    Overview      a1c high at 8.0 ,he is compliant with meds . Has 2 steroid injections just prior to labs . Will repeat A1c .            Other    NEETA (obstructive sleep apnea)    Overview     Has not restarted cpap as he just had ent procedure . Dr kapoor is managing          Other insomnia    Overview     Will add trazodone 100 m g            As above, continue current medications and maintain follow up with specialists.  Return to clinic in 3 months.  '    Frederick W Dantagnan [facility]

## 2024-03-07 ENCOUNTER — TELEPHONE (OUTPATIENT)
Dept: PSYCHIATRY | Facility: CLINIC | Age: 59
End: 2024-03-07
Payer: COMMERCIAL

## 2024-03-07 NOTE — TELEPHONE ENCOUNTER
Pt has [place] ins gave pt Ins dept number as well Billing to make payment for visit. Made pt aware pt was not going to be seen for ADHD appt as referral stated in system. Pt only wanted psych eval for first visit . Sara Weinberg

## 2024-03-20 ENCOUNTER — PATIENT MESSAGE (OUTPATIENT)
Dept: PRIMARY CARE CLINIC | Facility: CLINIC | Age: 59
End: 2024-03-20
Payer: COMMERCIAL

## 2024-03-21 ENCOUNTER — TELEPHONE (OUTPATIENT)
Dept: NEUROLOGY | Facility: CLINIC | Age: 59
End: 2024-03-21
Payer: COMMERCIAL

## 2024-03-22 ENCOUNTER — TELEPHONE (OUTPATIENT)
Dept: NEUROLOGY | Facility: CLINIC | Age: 59
End: 2024-03-22
Payer: COMMERCIAL

## 2024-03-22 ENCOUNTER — PATIENT MESSAGE (OUTPATIENT)
Dept: PRIMARY CARE CLINIC | Facility: CLINIC | Age: 59
End: 2024-03-22
Payer: COMMERCIAL

## 2024-03-22 DIAGNOSIS — E11.9 DIABETES MELLITUS WITHOUT COMPLICATION: Primary | ICD-10-CM

## 2024-03-22 NOTE — TELEPHONE ENCOUNTER
----- Message from Lorenza Drew sent at 3/22/2024 10:29 AM CDT -----  Regarding: Self [phone]  Type: Patient Call Back     Who called: Self     What is the request in detail: Pt returning call to schedule EMG.      Can the clinic reply by [facility]? Yes     Would the patient rather a call back or a response via [facility]? [facility]     Best call back number: [phone]      Additional Information: Pt is ok with late May appt. Ok to update in portal.    Called pt about scheduling EMG, he's scheduled for 5/14.

## 2024-03-25 DIAGNOSIS — E78.2 MIXED HYPERLIPIDEMIA: ICD-10-CM

## 2024-03-25 NOTE — TELEPHONE ENCOUNTER
No care due was identified.  [facility] Embedded Care Due Messages. Reference number: [id number].   3/25/2024 5:03:36 PM CDT

## 2024-03-26 RX ORDER — ROSUVASTATIN CALCIUM 20 MG/1
20 TABLET, COATED ORAL
Qty: 90 TABLET | Refills: 3 | Status: SHIPPED | OUTPATIENT
Start: 2024-03-26

## 2024-03-26 NOTE — TELEPHONE ENCOUNTER
Refill Routing Note   Medication(s) are not appropriate for processing by [facility] for the following reason(s):        Responsible provider unclear    [facility] action(s):  Defer             Appointments  past 12m or future 3m with PCP    Date Provider   Last Visit   3/5/2024 Elias Garcia MD   Next Visit   6/5/2024 Elias Garcia MD   ED visits in past 90 days: 0        Note composed:1:01 PM 03/26/2024

## 2024-04-01 ENCOUNTER — CLINICAL SUPPORT (OUTPATIENT)
Dept: REHABILITATION | Facility: HOSPITAL | Age: 59
End: 2024-04-01
Attending: STUDENT IN AN ORGANIZED HEALTH CARE EDUCATION/TRAINING PROGRAM
Payer: COMMERCIAL

## 2024-04-01 ENCOUNTER — PATIENT MESSAGE (OUTPATIENT)
Dept: PRIMARY CARE CLINIC | Facility: CLINIC | Age: 59
End: 2024-04-01
Payer: COMMERCIAL

## 2024-04-01 DIAGNOSIS — M25.60 DECREASED MOBILITY OF JOINT: Primary | ICD-10-CM

## 2024-04-01 DIAGNOSIS — M25.50 ARTHRALGIA, UNSPECIFIED JOINT: ICD-10-CM

## 2024-04-01 DIAGNOSIS — M79.7 FIBROMYALGIA: ICD-10-CM

## 2024-04-01 PROCEDURE — 97162 PT EVAL MOD COMPLEX 30 MIN: CPT

## 2024-04-01 NOTE — PROGRESS NOTES
"[facility]   Physical Therapy Initial Evaluation      Name: Ian Cifuentes  Clinic Number: [id number]  Therapy Diagnosis:   Encounter Diagnoses   Name Primary?    Fibromyalgia     Arthralgia, unspecified joint     Decreased mobility of joint Yes        Physician: Krishna Garcia,*  Physician Orders: PT Eval and Treat   Medical Diagnosis from Referral:   M79.7 (ICD-10-CM) - Fibromyalgia   M25.50 (ICD-10-CM) - Arthralgia, unspecified joint   Evaluation Date: 4/1/2024  Authorization Period Expiration: 12/31/2024  Plan of Care Expiration: 5/1/2024  Progress Note Due: 5/1/2024  Date of Surgery: None  Visit # / Visits authorized: 1/ 1   FOTO: 1/ 3  Precautions: Standard and Diabetes HTN*, HLD  Time In: 5:00 pm  Time Out: 6:00 pm  Total Billable Time: 60 minutes    Subjective   Date of onset: 2021  History of current condition - Ian reports: "If I do anything with my hands, I feel like I'm "damaging" my hands to where I can't use them anymore. Everything hurts." He reports having had two bags of groceries down at his sides, he has pain shooting into his wrists and up into his arms and elbows, with. electric shocks down the inside of the arm, primarily in the left arm, but depending on the activity he is doing. He also notes pain in the entirity of his left leg, but notes that the most debilitating area is both of his hands and wrists. He reports having extensive workups for rheumatologic, neurologic and inflammatory causes for pain, all of which has been negative. Pain medication and steroid injections have not improved anything.  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     Medical History:   Past Medical History:   Diagnosis Date    Acid reflux      Surgical History:   Ian Cifuentes  has a past surgical history that includes Tonsillectomy.    Medications:   Ian has a current medication list which includes the following prescription(s): alprazolam, bupropion, dextroamphetamine-amphetamine, escitalopram oxalate, metformin, pantoprazole, rosuvastatin, semaglutide, and trazodone, and the following Facility-Administered Medications: lidocaine hcl 10 mg/ml (1%) and LIDOcaine-EPINEPHrine 1%-1:100,000 30 mL, LIDOcaine HCL 10 mg/ml (1%) 20 mL, sodium bicarbonate 10 mL in sodium chloride 0.9% 500 mL solution.    Allergies:   Review of patient's allergies indicates:  No Known Allergies     Objective       Right Left   Biceps Brachii 2+          2+   Brachioradialis 2+          2+   Triceps Brachii 2+          2+     Sensation: Cutaneous and dermatomal sensation is intact and symmetrical.     Right Left   Upper Trapezius (C2,3,4) - -   Shoulder Abduction (C5) 5/5 5/5   Elbow Flexion (C6) 5/5 5/5   Elbow Extension (C7) 5/5 5/5   Wrist Flexion(C7) 5/5 5/5   Wrist Extension (C6) 5/5 5/5   Thumb Abduction (C8-T1) 5/5 5/5   Dorsal Interossei (C8-T1) 5/5 5/5   Palmar Interossei (C8-T1) 5/5 5/5        Elbow  Right Left Pain/Dysfunction with Movement   AROM      Flexion   130 degrees  130 degrees    Extension  0 degrees  0 degrees    Pronation 60 degrees 70 degrees    Supination  90 degrees  90 degrees      Wrist Right Left Pain/Dysfunction with  Movement   PROM      Flexion   60 degrees  60 degrees    Extension  50 degrees  40 degrees        Intake Outcome Measure for FOTO - Survey    Therapist reviewed FOTO scores for Ian Cifuentes on 4/1/2024.   FOTO report - see Media section or FOTO account episode details.    Intake Score: -%         Treatment     Total Treatment time (time-based codes) separate from Evaluation: 5 minutes     Ian received the treatments listed below:      therapeutic exercises to develop ROM, flexibility, and posture for 5 minutes including:  Seated Thoracic Extension Self-Mobilizations    Patient Education and Home Exercises     Education provided:   - -Findings of evaluation and examination, and affect of these on plan for treatment  -Prognosis and expectations  -Role of PT and team-centered care for patient  -Home exercise program and expectations of therapy      Written Home Exercises Provided: yes. Exercises were reviewed and Ian was able to demonstrate them prior to the end of the session.  Ian demonstrated good  understanding of the education provided. See EMR under Patient Instructions for exercises provided during therapy sessions.    Assessment     Ian is a 59 y.o. male referred to outpatient Physical Therapy with a medical diagnosis of   M79.7 (ICD-10-CM) - Fibromyalgia   M25.50 (ICD-10-CM) - Arthralgia, unspecified joint   Patient presents with unremarkable neurological screen of the cervical spine and upper extremities, including normal deep tendon reflexes, strength without fatigable weakness and sensation. He demonstrate objective limitations of elbow pronation and wrist extension and flexion. The patient's symptoms appear to be complex in behavior and origin, including psychosocial factors and pain beliefs. Discussion of the Functional Restoration Program was discussed as being a more suitable alternative, however, the patient notes that due to continuing his work schedule, he cannot commit to the amount of  time that FRP would require and he would like to attempt PT. Plan for this time is to treat and continue communication with referring provider as to the benefit of hand- therapy for upper extremity pain and PT to focus on leg pain.    Patient prognosis is Guarded.   Patient will benefit from skilled outpatient Physical Therapy to address the deficits stated above and in the chart below, provide patient /family education, and to maximize patientt's level of independence.     Plan of care discussed with patient: Yes  Patient's spiritual, cultural and educational needs considered and patient is agreeable to the plan of care and goals as stated below:     Anticipated Barriers for therapy: Chronicity, Unknown Etiology, Hypersensitivity, Irritability of Symptoms, Pain Beliefs    Medical Necessity is demonstrated by the following  History  Co-morbidities and personal factors that may impact the plan of care [] LOW: no personal factors / co-morbidities  [] MODERATE: 1-2 personal factors / co-morbidities  [x] HIGH: 3+ personal factors / co-morbidities    Moderate / High Support Documentation:   Co-morbidities affecting plan of care: See Medical History    Personal Factors:   age  education level  coping style  lifestyle  character     Examination  Body Structures and Functions, activity limitations and participation restrictions that may impact the plan of care [] LOW: addressing 1-2 elements  [x] MODERATE: 3+ elements  [] HIGH: 4+ elements (please support below)    Moderate / High Support Documentation: Multiple Body Regions (bilateral wrist, elbows, knees, feet)     Clinical Presentation [] LOW: stable  [x] MODERATE: Evolving  [] HIGH: Unstable     Decision Making/ Complexity Score: moderate       Goals:  Short Term Goals: 2 weeks   1.) Patient will demonstrate independence in compliance and technique of home exercise program provided as per teach-back method of assessment.  2.) Patient will report a 25% improvement in  wrist extension and flexion mobility with good carry-over in range of motion improvements.    Long Term Goals: 4 weeks   1.) Patient will demonstrate independence in compliance and technique of home exercise program provided as per teach-back method of assessment.  2.) Patient will report a 25% improvement in wrist extension and flexion mobility with good carry-over in range of motion improvements.  3.) Patient will demonstrate a 25% improvement as per FOTO score to demonstrate improvements in perceived functional ability,    Plan     Plan of care Certification: 4/1/2024 to 5/1/2024.    Outpatient Physical Therapy 1 times weekly for 4 weeks to include the following interventions: Manual Therapy, Moist Heat/ Ice, Neuromuscular Re-ed, Patient Education, Self Care, Therapeutic Activities, and Therapeutic Exercise.     Mag Funes PT DPT  Board Certified in Orthopedic Physical Therapy          Physician's Signature: _________________________________________ Date: ________________

## 2024-04-01 NOTE — Clinical Note
Dr. Garcia, I am the physical therapist who evaluated our patient, Mr. Cifuentes. His symptoms seem to be complex, both in behavior and in origin. He has complaints in multiple body regions, of which the nature of the symptoms remains elusive. Due to the widespread amount of pain, and to his inability to commit to Functional Restoration, which might be the most appropriate at this time, what might your thoughts be on referring him to OT for his bilateral upper extremity pain, and I can take care of lower extremity in PT? I think that all his body regions may be difficult to treat with quality and consistency in PT alone. Happy to know your thoughts. Mag Funes, PT DPT Board Certified in Orthopedic Physical Therapy

## 2024-04-03 DIAGNOSIS — E11.9 TYPE 2 DIABETES MELLITUS WITHOUT COMPLICATION: ICD-10-CM

## 2024-04-08 NOTE — PLAN OF CARE
"[facility]   Physical Therapy Initial Evaluation      Name: Ian Cifuentes  Clinic Number: [id number]  Therapy Diagnosis:   Encounter Diagnoses   Name Primary?    Fibromyalgia     Arthralgia, unspecified joint     Decreased mobility of joint Yes        Physician: Krishna Garcia,*  Physician Orders: PT Eval and Treat   Medical Diagnosis from Referral:   M79.7 (ICD-10-CM) - Fibromyalgia   M25.50 (ICD-10-CM) - Arthralgia, unspecified joint   Evaluation Date: 4/1/2024  Authorization Period Expiration: 12/31/2024  Plan of Care Expiration: 5/1/2024  Progress Note Due: 5/1/2024  Date of Surgery: None  Visit # / Visits authorized: 1/ 1   FOTO: 1/ 3  Precautions: Standard and Diabetes HTN*, HLD  Time In: 5:00 pm  Time Out: 6:00 pm  Total Billable Time: 60 minutes    Subjective   Date of onset: 2021  History of current condition - Ian reports: "If I do anything with my hands, I feel like I'm "damaging" my hands to where I can't use them anymore. Everything hurts." He reports having had two bags of groceries down at his sides, he has pain shooting into his wrists and up into his arms and elbows, with. electric shocks down the inside of the arm, primarily in the left arm, but depending on the activity he is doing. He also notes pain in the entirity of his left leg, but notes that the most debilitating area is both of his hands and wrists. He reports having extensive workups for rheumatologic, neurologic and inflammatory causes for pain, all of which has been negative. Pain medication and steroid injections have not improved anything.  Falls: None  Imaging: See Media  Prior Therapy: Yes, reports that it did not help and he could not tolerate the "exercise" portion of this hands  Social History:  Lives with their spouse  Occupation: Operates at a [employer]   Prior Level of Function: Moderate limitation with activities of daily living, work-related activities  Current " Level of Function: Moderate limitation with activities of daily living, work-related activities    Pain:  Current 4/10, worst 8/10, best 3/10   Location: Entire body  Description: Aching, Dull, Sharp, and Shooting  Aggravating Factors: Random (especially legs)  Easing Factors: Nothing (24/7 there is never a day without pain)    Patients goals: To decreased the pain in his hands so that he can use them with less pain and improve quality of life.     Medical History:   Past Medical History:   Diagnosis Date    Acid reflux      Surgical History:   Ian Cifuentes  has a past surgical history that includes Tonsillectomy.    Medications:   Ian has a current medication list which includes the following prescription(s): alprazolam, bupropion, dextroamphetamine-amphetamine, escitalopram oxalate, metformin, pantoprazole, rosuvastatin, semaglutide, and trazodone, and the following Facility-Administered Medications: lidocaine hcl 10 mg/ml (1%) and LIDOcaine-EPINEPHrine 1%-1:100,000 30 mL, LIDOcaine HCL 10 mg/ml (1%) 20 mL, sodium bicarbonate 10 mL in sodium chloride 0.9% 500 mL solution.    Allergies:   Review of patient's allergies indicates:  No Known Allergies     Objective       Right Left   Biceps Brachii 2+          2+   Brachioradialis 2+          2+   Triceps Brachii 2+          2+     Sensation: Cutaneous and dermatomal sensation is intact and symmetrical.     Right Left   Upper Trapezius (C2,3,4) - -   Shoulder Abduction (C5) 5/5 5/5   Elbow Flexion (C6) 5/5 5/5   Elbow Extension (C7) 5/5 5/5   Wrist Flexion(C7) 5/5 5/5   Wrist Extension (C6) 5/5 5/5   Thumb Abduction (C8-T1) 5/5 5/5   Dorsal Interossei (C8-T1) 5/5 5/5   Palmar Interossei (C8-T1) 5/5 5/5        Elbow  Right Left Pain/Dysfunction with Movement   AROM      Flexion   130 degrees  130 degrees    Extension  0 degrees  0 degrees    Pronation 60 degrees 70 degrees    Supination  90 degrees  90 degrees      Wrist Right Left Pain/Dysfunction with  Movement   PROM      Flexion   60 degrees  60 degrees    Extension  50 degrees  40 degrees        Intake Outcome Measure for FOTO - Survey    Therapist reviewed FOTO scores for Ian Cifuentes on 4/1/2024.   FOTO report - see Media section or FOTO account episode details.    Intake Score: -%         Treatment     Total Treatment time (time-based codes) separate from Evaluation: 5 minutes     Ian received the treatments listed below:      therapeutic exercises to develop ROM, flexibility, and posture for 5 minutes including:  Seated Thoracic Extension Self-Mobilizations    Patient Education and Home Exercises     Education provided:   - -Findings of evaluation and examination, and affect of these on plan for treatment  -Prognosis and expectations  -Role of PT and team-centered care for patient  -Home exercise program and expectations of therapy      Written Home Exercises Provided: yes. Exercises were reviewed and Ian was able to demonstrate them prior to the end of the session.  Ian demonstrated good  understanding of the education provided. See EMR under Patient Instructions for exercises provided during therapy sessions.    Assessment     Ian is a 59 y.o. male referred to outpatient Physical Therapy with a medical diagnosis of   M79.7 (ICD-10-CM) - Fibromyalgia   M25.50 (ICD-10-CM) - Arthralgia, unspecified joint   Patient presents with unremarkable neurological screen of the cervical spine and upper extremities, including normal deep tendon reflexes, strength without fatigable weakness and sensation. He demonstrate objective limitations of elbow pronation and wrist extension and flexion. The patient's symptoms appear to be complex in behavior and origin, including psychosocial factors and pain beliefs. Discussion of the Functional Restoration Program was discussed as being a more suitable alternative, however, the patient notes that due to continuing his work schedule, he cannot commit to the amount of  time that FRP would require and he would like to attempt PT. Plan for this time is to treat and continue communication with referring provider as to the benefit of hand- therapy for upper extremity pain and PT to focus on leg pain.    Patient prognosis is Guarded.   Patient will benefit from skilled outpatient Physical Therapy to address the deficits stated above and in the chart below, provide patient /family education, and to maximize patientt's level of independence.     Plan of care discussed with patient: Yes  Patient's spiritual, cultural and educational needs considered and patient is agreeable to the plan of care and goals as stated below:     Anticipated Barriers for therapy: Chronicity, Unknown Etiology, Hypersensitivity, Irritability of Symptoms, Pain Beliefs    Medical Necessity is demonstrated by the following  History  Co-morbidities and personal factors that may impact the plan of care [] LOW: no personal factors / co-morbidities  [] MODERATE: 1-2 personal factors / co-morbidities  [x] HIGH: 3+ personal factors / co-morbidities    Moderate / High Support Documentation:   Co-morbidities affecting plan of care: See Medical History    Personal Factors:   age  education level  coping style  lifestyle  character     Examination  Body Structures and Functions, activity limitations and participation restrictions that may impact the plan of care [] LOW: addressing 1-2 elements  [x] MODERATE: 3+ elements  [] HIGH: 4+ elements (please support below)    Moderate / High Support Documentation: Multiple Body Regions (bilateral wrist, elbows, knees, feet)     Clinical Presentation [] LOW: stable  [x] MODERATE: Evolving  [] HIGH: Unstable     Decision Making/ Complexity Score: moderate       Goals:  Short Term Goals: 2 weeks   1.) Patient will demonstrate independence in compliance and technique of home exercise program provided as per teach-back method of assessment.  2.) Patient will report a 25% improvement in  wrist extension and flexion mobility with good carry-over in range of motion improvements.    Long Term Goals: 4 weeks   1.) Patient will demonstrate independence in compliance and technique of home exercise program provided as per teach-back method of assessment.  2.) Patient will report a 25% improvement in wrist extension and flexion mobility with good carry-over in range of motion improvements.  3.) Patient will demonstrate a 25% improvement as per FOTO score to demonstrate improvements in perceived functional ability,    Plan     Plan of care Certification: 4/1/2024 to 5/1/2024.    Outpatient Physical Therapy 1 times weekly for 4 weeks to include the following interventions: Manual Therapy, Moist Heat/ Ice, Neuromuscular Re-ed, Patient Education, Self Care, Therapeutic Activities, and Therapeutic Exercise.     Mag Funes PT DPT  Board Certified in Orthopedic Physical Therapy          Physician's Signature: _________________________________________ Date: ________________

## 2024-04-10 ENCOUNTER — TELEPHONE (OUTPATIENT)
Dept: PAIN MEDICINE | Facility: CLINIC | Age: 59
End: 2024-04-10
Payer: COMMERCIAL

## 2024-04-10 ENCOUNTER — PATIENT MESSAGE (OUTPATIENT)
Dept: ADMINISTRATIVE | Facility: HOSPITAL | Age: 59
End: 2024-04-10
Payer: COMMERCIAL

## 2024-04-10 ENCOUNTER — CLINICAL SUPPORT (OUTPATIENT)
Dept: REHABILITATION | Facility: HOSPITAL | Age: 59
End: 2024-04-10
Attending: STUDENT IN AN ORGANIZED HEALTH CARE EDUCATION/TRAINING PROGRAM
Payer: COMMERCIAL

## 2024-04-10 DIAGNOSIS — M25.50 ARTHRALGIA, UNSPECIFIED JOINT: ICD-10-CM

## 2024-04-10 DIAGNOSIS — M79.7 FIBROMYALGIA: Primary | ICD-10-CM

## 2024-04-10 DIAGNOSIS — M25.50 ARTHRALGIA, UNSPECIFIED JOINT: Primary | ICD-10-CM

## 2024-04-10 DIAGNOSIS — M25.60 DECREASED MOBILITY OF JOINT: ICD-10-CM

## 2024-04-10 PROCEDURE — 97112 NEUROMUSCULAR REEDUCATION: CPT

## 2024-04-10 PROCEDURE — 97140 MANUAL THERAPY 1/> REGIONS: CPT

## 2024-04-10 PROCEDURE — 97110 THERAPEUTIC EXERCISES: CPT

## 2024-04-10 NOTE — PROGRESS NOTES
"  Physical Therapy Daily Treatment Note     Name: Ian Cifuentes  Clinic Number: [id number]    Therapy Diagnosis:   Encounter Diagnoses   Name Primary?    Fibromyalgia Yes    Arthralgia, unspecified joint     Decreased mobility of joint      Physician: Krishna Garcia,*    Visit Date: 4/10/2024    Physician: Krishna Garcia,*  Physician Orders: PT Eval and Treat   Medical Diagnosis from Referral:   M79.7 (ICD-10-CM) - Fibromyalgia   M25.50 (ICD-10-CM) - Arthralgia, unspecified joint   Evaluation Date: 4/1/2024  Authorization Period Expiration: 12/31/2024  Plan of Care Expiration: 5/1/2024  Progress Note Due: 5/1/2024  Date of Surgery: None  Visit # / Visits authorized: 1/ 1   Time In: 7:08 am  Time Out: 8:00 am  Total Billable Time: 52 minutes  Precautions: Standard and Diabetes HTN*, HLD  Subjective   Pt reports: after the last session, his hands were feeling better.  He was compliant with home exercise program.  Response to previous treatment: Initial evaluation  Functional change: Ongoing; improved pain  Pain: No number provided/10  Location: Bilateral hands and elbows   Objective   Left Wrist Extension: 50 degrees (60 degrees after manual therapy)  Right Wrist Extension: 40 degrees, (45 degrees after manual therapy)    Ian received therapeutic exercises to develop strength, endurance, ROM, flexibility, posture, and core stabilization for 24 minutes including:  Wrist Extension, cueing for increased medial deviation, 3x5 each side  Open Books, 12x5" hold each side  UBE, 3-1 minute intervals, 30 seconds forward/backward    Ian received the following manual therapy techniques: Joint mobilizations and Manual traction were applied for 18inutes, including:  Wrist Extension Mobilizations, Grade III-IV  Prone Thoracic Extension Mobilizations, Grade V  Radiohumeral Distraction, Grade V, bilateral  (B) 1st CMC Mobiizations, bilateral    Ian participated in neuromuscular re-education activities to " improve: Balance, Coordination, Kinesthetic, Sense, Proprioception, and Posture for 10 minutes. The following activities were included:  Prone Middle Trapezius, Level 1, 2x12    Ian participated in dynamic functional therapeutic activities to improve functional performance for 0  minutes, including:    Home Exercises Provided and Patient Education Provided     Education provided:   - Home exercise program     Written Home Exercises Provided: Patient instructed to cont prior HEP.  Exercises were reviewed and Ian was able to demonstrate them prior to the end of the session.  Ian demonstrated good  understanding of the education provided.     See EMR under Patient Instructions for exercises provided prior visit.    Assessment   Ian demonstrates 50 degrees of wrist extension on the left, and only 40 degrees on the right that improved by 10 degrees on the left and 5 degrees on the right. Pronation and elbow flexion was limited and manual therapy was used to mobilize the radiohumeral joint. He started getting some increased pain on the left elbow upon conclusion of therapy, but was able to tolerate exercise.  Ian Is progressing well towards his goals.   Pt prognosis is Guarded.     Pt will continue to benefit from skilled outpatient physical therapy to address the deficits listed in the problem list box on initial evaluation, provide pt/family education and to maximize pt's level of independence in the home and community environment.     Pt's spiritual, cultural and educational needs considered and pt agreeable to plan of care and goals.     Anticipated barriers to physical therapy: Chronicity, Unknown Etiology, Hypersensitivity, Irritability of Symptoms, Pain Beliefs    Goals:    Short Term Goals: 2 weeks   1.) Patient will demonstrate independence in compliance and technique of home exercise program provided as per teach-back method of assessment.  2.) Patient will report a 25% improvement in wrist extension  and flexion mobility with good carry-over in range of motion improvements.     Long Term Goals: 4 weeks   1.) Patient will demonstrate independence in compliance and technique of home exercise program provided as per teach-back method of assessment.  2.) Patient will report a 25% improvement in wrist extension and flexion mobility with good carry-over in range of motion improvements.  3.) Patient will demonstrate a 25% improvement as per FOTO score to demonstrate improvements in perceived functional ability,    Plan     Continue to progress as per plan of care.    Mag Funes, PT DPT  Board Certified in Orthopedic Physical Therapy

## 2024-04-23 ENCOUNTER — OFFICE VISIT (OUTPATIENT)
Dept: PSYCHIATRY | Facility: CLINIC | Age: 59
End: 2024-04-23
Payer: COMMERCIAL

## 2024-04-23 VITALS
HEART RATE: 69 BPM | SYSTOLIC BLOOD PRESSURE: 147 MMHG | DIASTOLIC BLOOD PRESSURE: 85 MMHG | BODY MASS INDEX: 33.73 KG/M2 | WEIGHT: 255.63 LBS

## 2024-04-23 DIAGNOSIS — F41.1 GENERALIZED ANXIETY DISORDER: ICD-10-CM

## 2024-04-23 DIAGNOSIS — F33.0 DEPRESSION, MAJOR, RECURRENT, MILD: Primary | ICD-10-CM

## 2024-04-23 PROCEDURE — 3051F HG A1C>EQUAL 7.0%<8.0%: CPT | Mod: CPTII,S$GLB,, | Performed by: NURSE PRACTITIONER

## 2024-04-23 PROCEDURE — 3008F BODY MASS INDEX DOCD: CPT | Mod: CPTII,S$GLB,, | Performed by: NURSE PRACTITIONER

## 2024-04-23 PROCEDURE — 99205 OFFICE O/P NEW HI 60 MIN: CPT | Mod: S$GLB,,, | Performed by: NURSE PRACTITIONER

## 2024-04-23 PROCEDURE — 3079F DIAST BP 80-89 MM HG: CPT | Mod: CPTII,S$GLB,, | Performed by: NURSE PRACTITIONER

## 2024-04-23 PROCEDURE — 99999 PR PBB SHADOW E&M-EST. PATIENT-LVL III: CPT | Mod: PBBFAC,,, | Performed by: NURSE PRACTITIONER

## 2024-04-23 PROCEDURE — 3077F SYST BP >= 140 MM HG: CPT | Mod: CPTII,S$GLB,, | Performed by: NURSE PRACTITIONER

## 2024-04-23 NOTE — PROGRESS NOTES
"Outpatient Psychiatry Initial Visit (MD/NP)    4/23/2024    Ian Cifuentes, a 59 y.o. male, presenting for initial evaluation visit. Met with patient.    Reason for Encounter: self-referral. Patient complains of attention problems.    History of Present Illness:    Pt is a 59 year old male who presents for psychaitric evaluation. Reports ADHD sx in childhood. Struggled in school. Dx with ADHD last year but he doubts the validity. Describes undx learning and comprehension disability but never tested when in school.  Affect appears bright with euthymic mood. Thought processes are linear, clear, and organized. Denies SI/HI/AVH.        ADHD Adult:  Have difficulty sustaining attention in tasks or fun activities? yes   Don't follow through on instructions and fail to finish work? yes   Have difficulty organizing tasks and activities? yes   Avoid, dislike, or are reluctant to engage in work thar requires sustained mental effort? yes   Easily distracted? yes   Forgetful in daily activities? yes   Fidget with hands or feet, or squirm in seat? no  Have difficulty engaging in leisure activities or doing fun things quietly? no  Feel "on the go" or "driven by a motor"? no  Blurt out answers before questions have been completed? no  Have difficulty waiting your turn, are impatient? no  Interrupt or intrude on others? 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   with kids.   Social History            Socioeconomic History    Marital status:    Tobacco Use    " Smoking status: Never    Smokeless tobacco: Never   Substance and Sexual Activity    Alcohol use: Yes       Alcohol/week: 3.0 standard drinks of alcohol       Types: 3 Cans of beer per week       Comment: <1    Drug use: No    Sexual activity: Not Currently       Partners: Female       Medical History: DM, hyperlipidemia    Review Of Systems:     GENERAL:  No weight gain or loss  SKIN:  No rashes or lacerations  HEAD:  No headaches  EYES:  No exophthalmos, jaundice or blindness  EARS:  No dizziness, tinnitus or hearing loss  NOSE:  No changes in smell  MOUTH & THROAT:  No dyskinetic movements or obvious goiter  CHEST:  No shortness of breath, hyperventilation or cough  CARDIOVASCULAR:  No tachycardia or chest pain  ABDOMEN:  No nausea, vomiting, pain, constipation or diarrhea  URINARY:  No frequency, dysuria or sexual dysfunction  ENDOCRINE:  No polydipsia, polyuria  MUSCULOSKELETAL:  No pain or stiffness of the joints  NEUROLOGIC:  No weakness, sensory changes, seizures, confusion, memory loss, tremor or other abnormal movements    Current Evaluation:     Nutritional Screening: Considering the patient's height and weight, medications, medical history and preferences, should a referral be made to the dietitian? no    Constitutional  Vitals:  Most recent vital signs, dated greater than 90 days prior to this appointment, were reviewed.    Vitals:    04/23/24 1519   BP: (!) 147/85   Pulse: 69   Weight: 115.9 kg (255 lb 10 oz)        General:  unremarkable, age appropriate     Musculoskeletal  Muscle Strength/Tone:  not examined   Gait & Station:  non-ataxic     Psychiatric  Speech:  no latency; no press   Mood & Affect:  steady  congruent and appropriate   Thought Process:  normal and logical   Associations:  intact   Thought Content:  normal, no suicidality, no homicidality, delusions, or paranoia   Insight:  intact   Judgement: behavior is adequate to circumstances   Orientation:  grossly intact   Memory: intact for  content of interview   Language: grossly intact   Attention Span & Concentration:  able to focus   Fund of Knowledge:  intact and appropriate to age and level of education       Relevant Elements of Neurological Exam: normal gait    Functioning in Relationships:  Spouse/partner: see above HPI  Peers: see above HPI  Employers: see above HPI    Laboratory Data  No visits with results within 1 Month(s) from this visit.   Latest known visit with results is:   Lab Visit on 03/05/2024   Component Date Value Ref Range Status    Sodium 03/05/2024 140  136 - 145 mmol/L Final    Potassium 03/05/2024 4.3  3.5 - 5.1 mmol/L Final    Chloride 03/05/2024 104  95 - 110 mmol/L Final    CO2 03/05/2024 29  23 - 29 mmol/L Final    Glucose 03/05/2024 272 (H)  70 - 110 mg/dL Final    BUN 03/05/2024 14  6 - 20 mg/dL Final    Creatinine 03/05/2024 0.9  0.5 - 1.4 mg/dL Final    Calcium 03/05/2024 8.6 (L)  8.7 - 10.5 mg/dL Final    Total Protein 03/05/2024 6.6  6.0 - 8.4 g/dL Final    Albumin 03/05/2024 3.8  3.5 - 5.2 g/dL Final    Total Bilirubin 03/05/2024 0.5  0.1 - 1.0 mg/dL Final    Alkaline Phosphatase 03/05/2024 79  55 - 135 U/L Final    AST 03/05/2024 26  10 - 40 U/L Final    ALT 03/05/2024 30  10 - 44 U/L Final    eGFR 03/05/2024 >60.0  >60 mL/min/1.73 m^2 Final    Anion Gap 03/05/2024 7 (L)  8 - 16 mmol/L Final    Hemoglobin A1C 03/05/2024 8.5 (H)  4.0 - 5.6 % Final    Estimated Avg Glucose 03/05/2024 197 (H)  68 - 131 mg/dL Final    Cholesterol 03/05/2024 149  120 - 199 mg/dL Final    Triglycerides 03/05/2024 123  30 - 150 mg/dL Final    HDL 03/05/2024 38 (L)  40 - 75 mg/dL Final    LDL Cholesterol 03/05/2024 86.4  63.0 - 159.0 mg/dL Final    HDL/Cholesterol Ratio 03/05/2024 25.5  20.0 - 50.0 % Final    Total Cholesterol/HDL Ratio 03/05/2024 3.9  2.0 - 5.0 Final    Non-HDL Cholesterol 03/05/2024 111  mg/dL Final         Medications  Outpatient Encounter Medications as of 4/23/2024   Medication Sig Dispense Refill    ALPRAZolam  (XANAX) 0.5 MG tablet Take one tablet by mouth 1 hour before procedure and bring other tablet with you to the procedure. 2 tablet 0    buPROPion (WELLBUTRIN XL) 150 MG TB24 tablet TAKE ONE TABLET BY MOUTH EVERY DAY 90 tablet 3    dextroamphetamine-amphetamine (ADDERALL XR) 20 MG 24 hr capsule Take 1 capsule (20 mg total) by mouth every morning. 30 capsule 0    metFORMIN (GLUCOPHAGE-XR) 500 MG ER 24hr tablet Take 1 tablet (500 mg total) by mouth 2 (two) times daily with meals. 180 tablet 3    pantoprazole (PROTONIX) 40 MG tablet Take 1 tablet (40 mg total) by mouth once daily. 90 tablet 3    rosuvastatin (CRESTOR) 20 MG tablet TAKE ONE TABLET BY MOUTH EVERY DAY 90 tablet 3    traZODone (DESYREL) 100 MG tablet Take 1 tablet (100 mg total) by mouth every evening. 30 tablet 3    [DISCONTINUED] EScitalopram oxalate (LEXAPRO) 20 MG tablet Take 1 tablet (20 mg total) by mouth once daily. 30 tablet 5    [DISCONTINUED] rosuvastatin (CRESTOR) 20 MG tablet Take 1 tablet (20 mg total) by mouth once daily. 90 tablet 3    [DISCONTINUED] semaglutide (OZEMPIC) 0.25 mg or 0.5 mg (2 mg/3 mL) pen injector Inject 0.25 mg into the skin every 7 days. 1 each 0     Facility-Administered Encounter Medications as of 4/23/2024   Medication Dose Route Frequency Provider Last Rate Last Admin    LIDOcaine HCL 10 mg/ml (1%) injection 1 mL  1 mL Other 1 time in Clinic/HOD Aryan Atkinson MD        LIDOcaine-EPINEPHrine 1%-1:100,000 30 mL, LIDOcaine HCL 10 mg/ml (1%) 20 mL, sodium bicarbonate 10 mL in sodium chloride 0.9% 500 mL solution   MISCELLANEOUS 1 time in Clinic/HOD Aryan Atkinson MD           Assessment - Diagnosis - Goals:     Impression:       ICD-10-CM ICD-9-CM   1. Depression, major, recurrent, mild  F33.0 296.31   2. Generalized anxiety disorder  F41.1 300.02       Strengths and Liabilities: Strength: Patient accepts guidance/feedback, Strength: Patient is motivated for change., Strength: Patient has positive support  network., Strength: Patient has reasonable judgment.    Treatment Goals:  Specify outcomes written in observable, behavioral terms:   Anxiety: reducing negative automatic thoughts, reducing physical symptoms of anxiety, and reducing time spent worrying (<30 minutes/day)  Depression: increasing social contacts (three/week), reducing excessive guilt, reducing fatigue, and reducing negative automatic thoughts    Treatment Plan/Recommendations:   Medication Management: The risks and benefits of medication were discussed with the patient.  The treatment plan and follow up plan were reviewed with the patient.  Reviewed available medical records and labs  Continue Lexapro and Wellbutrin per PCP  Stop Adderall  Follow-up as needed     Return to Clinic: as needed    Counseling time: 35 minutes  Total time: 60 minutes

## 2024-05-08 ENCOUNTER — CLINICAL SUPPORT (OUTPATIENT)
Dept: REHABILITATION | Facility: HOSPITAL | Age: 59
End: 2024-05-08
Attending: STUDENT IN AN ORGANIZED HEALTH CARE EDUCATION/TRAINING PROGRAM
Payer: COMMERCIAL

## 2024-05-08 DIAGNOSIS — R20.1 IMPAIRED SENSATION: Primary | ICD-10-CM

## 2024-05-08 DIAGNOSIS — M25.60 DECREASED MOBILITY OF JOINT: ICD-10-CM

## 2024-05-08 DIAGNOSIS — M79.603 PAIN AND NUMBNESS OF UPPER EXTREMITY: ICD-10-CM

## 2024-05-08 DIAGNOSIS — M25.50 ARTHRALGIA, UNSPECIFIED JOINT: ICD-10-CM

## 2024-05-08 DIAGNOSIS — M79.7 FIBROMYALGIA: Primary | ICD-10-CM

## 2024-05-08 DIAGNOSIS — R20.0 PAIN AND NUMBNESS OF UPPER EXTREMITY: ICD-10-CM

## 2024-05-08 PROCEDURE — 97165 OT EVAL LOW COMPLEX 30 MIN: CPT

## 2024-05-08 PROCEDURE — 97110 THERAPEUTIC EXERCISES: CPT

## 2024-05-08 PROCEDURE — 97018 PARAFFIN BATH THERAPY: CPT

## 2024-05-08 PROCEDURE — 97140 MANUAL THERAPY 1/> REGIONS: CPT

## 2024-05-08 NOTE — PROGRESS NOTES
"  Physical Therapy Daily Treatment and Progress Note     Name: Ian Cifuentes  Clinic Number: [id number]    Therapy Diagnosis:   Encounter Diagnoses   Name Primary?    Fibromyalgia Yes    Arthralgia, unspecified joint     Decreased mobility of joint      Physician: Krishna Garcia,*    Visit Date: 5/8/2024  Physician: Krishna Garcia,*  Physician Orders: PT Eval and Treat   Medical Diagnosis from Referral:   M79.7 (ICD-10-CM) - Fibromyalgia   M25.50 (ICD-10-CM) - Arthralgia, unspecified joint   Evaluation Date: 4/1/2024  Authorization Period Expiration: 12/31/2024  Plan of Care Expiration: 5/1/2024  Progress Note Due: 5/1/2024  Date of Surgery: None  Visit # / Visits authorized: 2/ 20   Time In: 8:10 am  Time Out: 9:00 am  Total Billable Time: 50 minutes  Precautions: Standard and Diabetes HTN*, HLD  Subjective   Pt reports: he has not been to therapy in a couple weeks (last visit April 10) due to "moving, and personal issues." He does feel that his hands continue to get painful after he  something for too long. He has pain in both shoulders, elbows, wrists, and hands.  He was compliant with home exercise program.  Response to previous treatment: Initial evaluation  Functional change: Ongoing; improved pain  Pain: No number provided/10  Location: Bilateral hands and elbows   Objective   Left Wrist Extension: 50 degrees (55 degrees after manual therapy)  Right Wrist Extension: 40 degrees, (45 degrees after manual therapy)    Ian received therapeutic exercises to develop strength, endurance, ROM, flexibility, posture, and core stabilization for 32 minutes including:  NuStep, 7 minutes, Level 3, Hill Setting  Wrist Extension + Finger Abduction with Rubber Band (small) , cueing for increased medial deviation, 3x8   Seated Thoracic Extension Self-Mobilizations with 1/2 Foam Roller, 15x5" hold each side  Open Books, 12x5 each side, sidelying  Supine Bilateral Horizontal Abduction, yellow band, " 2x10    Ian received the following manual therapy techniques: Joint mobilizations and Manual traction were applied for 18 inutes, including:  Wrist Extension Mobilizations, Grade III-IV (bilateral)  Objective measures taken (see above)      Ian participated in neuromuscular re-education activities to improve: Balance, Coordination, Kinesthetic, Sense, Proprioception, and Posture for 0 minutes. The following activities were included:      Ian participated in dynamic functional therapeutic activities to improve functional performance for 0  minutes, including:    Home Exercises Provided and Patient Education Provided     Education provided:   - Home exercise program     Written Home Exercises Provided: Patient instructed to cont prior HEP.  Exercises were reviewed and Ian was able to demonstrate them prior to the end of the session.  Ian demonstrated good  understanding of the education provided.     See EMR under Patient Instructions for exercises provided prior visit.    Assessment   Ian demonstrates 50 degrees of wrist extension on the left, and only 40 degrees on the right that improved by 10 degrees on the left and 5 degrees on the right. Progress has remained delayed due to this being the patient's second visit since evaluation. He will now be treating with hand-therapy for upper extremities, therefore Ian will benefit from a customized physical therapy plan of care  to address the aforementioned impairments in an effort to improve human function and quality of life, with further assessment of the lower extremities by PT.    Ian Is progressing well towards his goals.   Pt prognosis is Guarded.     Pt will continue to benefit from skilled outpatient physical therapy to address the deficits listed in the problem list box on initial evaluation, provide pt/family education and to maximize pt's level of independence in the home and community environment.     Pt's spiritual, cultural and educational  needs considered and pt agreeable to plan of care and goals.     Anticipated barriers to physical therapy: Chronicity, Unknown Etiology, Hypersensitivity, Irritability of Symptoms, Pain Beliefs    Goals:    Short Term Goals: 2 weeks   1.) Patient will demonstrate independence in compliance and technique of home exercise program provided as per teach-back method of assessment. Not Met  2.) Patient will report a 25% improvement in wrist extension and flexion mobility with good carry-over in range of motion improvements. Met     Long Term Goals: 4 weeks   1.) Patient will demonstrate independence in compliance and technique of home exercise program provided as per teach-back method of assessment. Not Met  2.) Patient will report a 50% improvement in wrist extension and flexion mobility with good carry-over in range of motion improvements. Not Met  3.) Patient will demonstrate a 25% improvement as per FOTO score to demonstrate improvements in perceived functional ability, Not Met    Plan     Continue to progress as per plan of care.    Mag Funes, PT DPT  Board Certified in Orthopedic Physical Therapy

## 2024-05-08 NOTE — PLAN OF CARE
[facility]  Occupational Therapy Initial Evaluation    Date: 5/8/2024  Name: Ian Cifuentes  Clinic Number: [id number]    Therapy Diagnosis:   Encounter Diagnoses   Name Primary?    Impaired sensation Yes    Pain and numbness of upper extremity      Physician: Krishna Garcia,*    Physician Orders: OT Eval and Treat  Medical Diagnosis: M25.50 (ICD-10-CM) - Arthralgia, unspecified joint   Surgical Procedure and Date: N/A / Date of Injury/Onset: 2021  Evaluation Date: 5/8/2024  Insurance Authorization Period Expiration: 12/31/2024  Plan of Care Expiration: 8/2/24 (12 weeks)  Date of Return to MD: date not set  Visit # / Visits authorized: 1 / 1  FOTO: completed     Precautions: Standard and Diabetes HTN*, HLD     Time In: 9:03 am  Time Out: 10:00 am  Total Appointment Time (timed & untimed codes): 57 minutes    SUBJECTIVE     Date of Onset: 2021    History of Current Condition/Mechanism of Injury: Ian reports: overall body pains started in chest a few years ago and since then has travelled to the extremities. He was working in [place], living in [place], and arms would get tired with prolonged driving. C/o forearm fatigue and shoulder fatigue with simple tasks. Thumb pain was very bad a month ago. Wrists, elbows, and shoulders also with difficulty (pain and fatigue) during prolonged activity such as lifting boxes/groceries. No signs of RA. Another EMG is scheduled soon.     Falls: none     Involved Side: Bilateral   Dominant Side: Left  Imaging: Reviewed   Prior Therapy: none   Occupation:  for industrial [employer]   Working presently: employed  Duties: use of hand tools     Functional Limitations/Social History:    Previous functional status includes: Independent with all ADLs and IADLs.     Current Functional Status   Home/Living environment: lives with their spouse      Limitation of Functional Status as follows:   ADLs/IADLs:     - Feeding: Mod I due to time and  painful    - Bathing: Mod I due to time and painful    - Dressing/Grooming: Mod I due to time and painful    - Driving: Mod I due to time and painful     Leisure: none noted     Pain:  Functional Pain Scale Rating 0-10: Current 2/10, worst 7/10, best 2/10   Location: B/L thumbs, wrists, elbows, shoulders   Description: Shooting and Stabbing, tingling, numbness, dull, constant, pulling, tight   Aggravating Factors: activity   Easing Factors: nothing, maybe exercise     Patient's Goals for Therapy: get rid of pain and have an exercise program for home     Medical History:   Past Medical History:   Diagnosis Date    Acid reflux      Surgical History:    has a past surgical history that includes Tonsillectomy.    Medications:   has a current medication list which includes the following prescription(s): alprazolam, bupropion, dextroamphetamine-amphetamine, escitalopram oxalate, metformin, pantoprazole, rosuvastatin, semaglutide, and trazodone, and the following Facility-Administered Medications: lidocaine hcl 10 mg/ml (1%) and LIDOcaine-EPINEPHrine 1%-1:100,000 30 mL, LIDOcaine HCL 10 mg/ml (1%) 20 mL, sodium bicarbonate 10 mL in sodium chloride 0.9% 500 mL solution.    Allergies:   Review of patient's allergies indicates:  No Known Allergies     OBJECTIVE     Observation/Appearance: Skin warm and dry.     Edema. None noted.     Elbow and Wrist ROM. WFLs - all planes of motion.     Hand ROM. Measured in degrees.   5/8/2024 5/8/2024    Left Right        Digits 2-5: WFLs WFLs        Thumb:            Opposition Touch to SF P3 Touch to RF P3      Strength (Dynamometer) and Pinch Strength (Pinch Gauge)  Measured in pounds to POP.   5/8/2024 5/8/2024    Left Right   Rung II 76 72   Key Pinch 19 20   3pt Pinch 13 12   2pt Pinch 12 14     Sensation. Pt endorses intermittent numbness and tingling in BUE(s) following prolonged time in one position.    5/8/2024 5/8/2024    Left Right   [place]     Normal 1.65-2.83 X    LF, RF, SF X  IF, LF, RF, SF   Diminished Light Touch 3.22-3.61 X   IF, thumb X   thumb   Diminished Protective 3.84-4.31     Loss of Protective 4.56-6.65     Untestable >6.65     2 Point Discrimination     Static     Dynamic       Manual Muscle Test   5/8/2024 5/8/2024    Left Right   Wrist Extension  5/5 5/5   Wrist Flexion 5/5 5/5   Radial Deviation 5/5 5/5   Ulnar Deviation 5/5 5/5   Supination 5/5 5/5   Pronation 5/5 5/5   Elbow Extension 5/5 5/5   Elbow Flexion 5/5 5/5     Special Tests  Phalen's Test - B/L   Segovia's Test - B/L   Durkan's Test - B/L     Limitation/Restriction for FOTO Forearm Survey    Therapist reviewed FOTO scores for Ian Cifuentes on 5/8/2024.   FOTO documents entered into [employer] - see Media section.    Limitation Score: 43%       Treatment   Total Treatment time (time-based codes) separate from Evaluation: 10 minutes    Ian received the treatments listed below:     Supervised modalities after being cleared for contradictions: Paraffin bath - with moist heat pack to B/L hand(s) for 8 minutes to increase blood flow, circulation, pain management, and for tissue elasticity prior to therex.     Therapeutic exercises to develop endurance, ROM, flexibility, and posture for 10 minutes, including:  - Diaphragmatic breathing exercises  - Median/Ulnar nerve glides  - ASTYM stretch positions 1-3    Patient Education and Home Exercises      Education provided:   - Role of OT and POC  - HEP     Written Home Exercises Provided: yes.  Exercises were reviewed and Ian was able to demonstrate them prior to the end of the session.  Ian demonstrated good  understanding of the education provided. See EMR under Patient Instructions for exercises provided during therapy sessions.     Pt was advised to perform these exercises free of pain, and to stop performing them if pain occurs.    Patient/Family Education: role of OT, goals for OT, scheduling/cancellations - pt verbalized understanding. Discussed  insurance limitations with patient.    ASSESSMENT     Ian Cifuentes is a 59 y.o. male referred to outpatient occupational therapy and presents with a medical diagnosis of arthralgia, unspecified joint.  Patient presents with the following therapy deficits: Decreased functional hand use, Increased pain, Joint Stiffness, Diminished/Impaired Sensation, and Diminished/Impaired Coordination and demonstrates limitations as described in the chart below. Following medical record review it is determined that pt will benefit from occupational therapy services in order to maximize pain free and/or functional use of bilateral arms. The following goals were discussed with the patient and patient is in agreement with them as to be addressed in the treatment plan. The patient's rehab potential is Guarded.     Anticipated barriers to occupational therapy: Chronicity, Unknown Etiology, Hypersensitivity, Irritability of Symptoms, Pain Beliefs  Pt has no cultural, educational or language barriers to learning provided.    Profile and History Assessment of Occupational Performance Level of Clinical Decision Making Complexity Score   Occupational Profile:   Ian Cifuentes is a 59 y.o. male who lives with their spouse and is currently employed Ian Cifuentes has difficulty with  ADLs and IADLs as listed previously, which  Affecting hisdaily functional abilities.      Comorbidities:    has a past medical history of Acid reflux.    Medical and Therapy History Review:   Expanded               Performance Deficits    Physical:  Muscle Endurance  Control of Voluntary Movement  Fine Motor Coordination  Proprioception Functions  Tactile Functions  Pain    Cognitive:  No Deficits    Psychosocial:    Habits  Routines  Rituals     Clinical Decision Making:  low    Assessment Process:  Detailed Assessments    Modification/Need for Assistance:  Minimal-Moderate Modifications/Assistance    Intervention Selection:  Limited Treatment  Options       low  Based on PMHX, co morbidities , data from assessments and functional level of assistance required with task and clinical presentation directly impacting function.         Goals:   The following goals were discussed with the patient and patient is in agreement with them as to be addressed in the treatment plan.   Short Term Goals (STGs) = Long Term Goals (LTGs); to be met by discharge.  LTG #1: Pt will report a pain level of 3-4 out of 10 at worst with daily hand use.   LTG #2: Pt will demo improved FOTO score by 5-10 points.   LTG #3: Pt will return to prior level of function for ADLs and household management.   LTG #4: Pt will demonstrate independence with issued HEP and modalities for pain/symptom management.    PLAN   Plan of Care Certification: 5/8/2024 to 8/2/24 (12 weeks).     Outpatient Occupational Therapy 1-2 times weekly for 12 weeks to include the following interventions: Paraffin, Fluidotherapy, Manual therapy/joint mobilizations, Modalities for pain management, US 3 mhz, Therapeutic exercises/activities., Strengthening, Orthotic Fabrication/Fit/Training, Edema Control, Electrical Modalities, Joint Protection, and Energy Conservation.      Tameka Mendez, OTR/L      I CERTIFY THE NEED FOR THESE SERVICES FURNISHED UNDER THIS PLAN OF TREATMENT AND WHILE UNDER MY CARE  Physician's comments:      Physician's Signature: ___________________________________________________

## 2024-05-08 NOTE — PATIENT INSTRUCTIONS
[facility], OCCUPATIONAL THERAPY  HOME EXERCISE PROGRAM          Median/Ulnar Nerve glides     Begin exercise with arms at sides and bend elbows to a 90* with palms facing up,   shoulders in a relaxed position       Straighten elbows, bend wrist so palms are facing the floor, then shrug shoulders (simultaneously).   Keep a fluid motion, alternating between the 2 positions.   Repeat 10 reps  Perform 4 times a day       [facility]  OCCUPATIONAL THERAPY  HOME EXERCISE PROGRAM   Perform 3 reps x 30 sec holds, 3 x a day

## 2024-05-08 NOTE — PLAN OF CARE
"  Physical Therapy Daily Treatment and Progress Note     Name: Ian Cifuentes  Clinic Number: [id number]    Therapy Diagnosis:   Encounter Diagnoses   Name Primary?    Fibromyalgia Yes    Arthralgia, unspecified joint     Decreased mobility of joint      Physician: Krishna Garcia,*    Visit Date: 5/8/2024  Physician: Krishna Garcia,*  Physician Orders: PT Eval and Treat   Medical Diagnosis from Referral:   M79.7 (ICD-10-CM) - Fibromyalgia   M25.50 (ICD-10-CM) - Arthralgia, unspecified joint   Evaluation Date: 4/1/2024  Authorization Period Expiration: 12/31/2024  Plan of Care Expiration: 5/1/2024  Progress Note Due: 5/1/2024  Date of Surgery: None  Visit # / Visits authorized: 2/ 20   Time In: 8:10 am  Time Out: 9:00 am  Total Billable Time: 50 minutes  Precautions: Standard and Diabetes HTN*, HLD  Subjective   Pt reports: he has not been to therapy in a couple weeks (last visit April 10) due to "moving, and personal issues." He does feel that his hands continue to get painful after he  something for too long. He has pain in both shoulders, elbows, wrists, and hands.  He was compliant with home exercise program.  Response to previous treatment: Initial evaluation  Functional change: Ongoing; improved pain  Pain: No number provided/10  Location: Bilateral hands and elbows   Objective   Left Wrist Extension: 50 degrees (55 degrees after manual therapy)  Right Wrist Extension: 40 degrees, (45 degrees after manual therapy)    Ian received therapeutic exercises to develop strength, endurance, ROM, flexibility, posture, and core stabilization for 32 minutes including:  NuStep, 7 minutes, Level 3, Hill Setting  Wrist Extension + Finger Abduction with Rubber Band (small) , cueing for increased medial deviation, 3x8   Seated Thoracic Extension Self-Mobilizations with 1/2 Foam Roller, 15x5" hold each side  Open Books, 12x5 each side, sidelying  Supine Bilateral Horizontal Abduction, yellow band, " 2x10    Ian received the following manual therapy techniques: Joint mobilizations and Manual traction were applied for 18 inutes, including:  Wrist Extension Mobilizations, Grade III-IV (bilateral)  Objective measures taken (see above)      Ian participated in neuromuscular re-education activities to improve: Balance, Coordination, Kinesthetic, Sense, Proprioception, and Posture for 0 minutes. The following activities were included:      Ian participated in dynamic functional therapeutic activities to improve functional performance for 0  minutes, including:    Home Exercises Provided and Patient Education Provided     Education provided:   - Home exercise program     Written Home Exercises Provided: Patient instructed to cont prior HEP.  Exercises were reviewed and Ian was able to demonstrate them prior to the end of the session.  Ian demonstrated good  understanding of the education provided.     See EMR under Patient Instructions for exercises provided prior visit.    Assessment   Ian demonstrates 50 degrees of wrist extension on the left, and only 40 degrees on the right that improved by 10 degrees on the left and 5 degrees on the right. Progress has remained delayed due to this being the patient's second visit since evaluation. He will now be treating with hand-therapy for upper extremities, therefore Ian will benefit from a customized physical therapy plan of care  to address the aforementioned impairments in an effort to improve human function and quality of life, with further assessment of the lower extremities by PT.    Ian Is progressing well towards his goals.   Pt prognosis is Guarded.     Pt will continue to benefit from skilled outpatient physical therapy to address the deficits listed in the problem list box on initial evaluation, provide pt/family education and to maximize pt's level of independence in the home and community environment.     Pt's spiritual, cultural and educational  needs considered and pt agreeable to plan of care and goals.     Anticipated barriers to physical therapy: Chronicity, Unknown Etiology, Hypersensitivity, Irritability of Symptoms, Pain Beliefs    Goals:    Short Term Goals: 2 weeks   1.) Patient will demonstrate independence in compliance and technique of home exercise program provided as per teach-back method of assessment. Not Met  2.) Patient will report a 25% improvement in wrist extension and flexion mobility with good carry-over in range of motion improvements. Met     Long Term Goals: 4 weeks   1.) Patient will demonstrate independence in compliance and technique of home exercise program provided as per teach-back method of assessment. Not Met  2.) Patient will report a 50% improvement in wrist extension and flexion mobility with good carry-over in range of motion improvements. Not Met  3.) Patient will demonstrate a 25% improvement as per FOTO score to demonstrate improvements in perceived functional ability, Not Met    Plan     Continue to progress as per plan of care.    Mag Funes, PT DPT  Board Certified in Orthopedic Physical Therapy

## 2024-05-08 NOTE — PROGRESS NOTES
[facility]: Occupational Therapy Note     See plan of care for full initial OT evaluation.    Tameka Mendez, DEV, OTR/L

## 2024-05-22 ENCOUNTER — CLINICAL SUPPORT (OUTPATIENT)
Dept: REHABILITATION | Facility: HOSPITAL | Age: 59
End: 2024-05-22
Attending: STUDENT IN AN ORGANIZED HEALTH CARE EDUCATION/TRAINING PROGRAM
Payer: COMMERCIAL

## 2024-05-22 ENCOUNTER — CLINICAL SUPPORT (OUTPATIENT)
Dept: REHABILITATION | Facility: HOSPITAL | Age: 59
End: 2024-05-22
Payer: COMMERCIAL

## 2024-05-22 DIAGNOSIS — M79.603 PAIN AND NUMBNESS OF UPPER EXTREMITY: ICD-10-CM

## 2024-05-22 DIAGNOSIS — R20.1 IMPAIRED SENSATION: Primary | ICD-10-CM

## 2024-05-22 DIAGNOSIS — R20.0 PAIN AND NUMBNESS OF UPPER EXTREMITY: ICD-10-CM

## 2024-05-22 DIAGNOSIS — M25.60 DECREASED MOBILITY OF JOINT: ICD-10-CM

## 2024-05-22 DIAGNOSIS — M25.50 ARTHRALGIA, UNSPECIFIED JOINT: ICD-10-CM

## 2024-05-22 DIAGNOSIS — M79.7 FIBROMYALGIA: ICD-10-CM

## 2024-05-22 PROCEDURE — 97112 NEUROMUSCULAR REEDUCATION: CPT

## 2024-05-22 PROCEDURE — 97140 MANUAL THERAPY 1/> REGIONS: CPT

## 2024-05-22 PROCEDURE — 97110 THERAPEUTIC EXERCISES: CPT

## 2024-05-22 PROCEDURE — 97018 PARAFFIN BATH THERAPY: CPT | Mod: 59

## 2024-05-22 NOTE — PROGRESS NOTES
"  Physical Therapy Daily Treatment and Progress Note     Name: Ian Cifuentes  Clinic Number: [id number]    Therapy Diagnosis:   Encounter Diagnoses   Name Primary?    Impaired sensation Yes    Pain and numbness of upper extremity     Fibromyalgia     Arthralgia, unspecified joint     Decreased mobility of joint      Physician: Krishna Garcia,*  Visit Date: 5/22/2024  Physician: Krishna Garcia,*  Physician Orders: PT Eval and Treat   Medical Diagnosis from Referral:   M79.7 (ICD-10-CM) - Fibromyalgia   M25.50 (ICD-10-CM) - Arthralgia, unspecified joint   Evaluation Date: 4/1/2024  Authorization Period Expiration: 12/31/2024  Plan of Care Expiration: 5/1/2024 *Update to 6/22/2024  Progress Note Due: 6/8/2024  Most Recent Progress Note: 5/8/2024  Date of Surgery: None  Visit # / Visits authorized: 3/ 20   Time In: 9:55 am  Time Out: 10:55 am  Total Billable Time: 60 minutes  Precautions: Standard and Diabetes HTN*, HLD  Subjective   Pt reports: he just came from hand therapy, and states that he would like for physical therapy to work on the "pinched nerve in his legs." He endorses that the back of his leg into the thigh that he feels after he has been "sitting" but it "keeps changing."  He was compliant with home exercise program.  Response to previous treatment: Initial evaluation  Functional change: Ongoing; improved pain  Pain: No number provided/10  Location: Bilateral hands and elbows   Objective   Postural Observation: Posterior view exhibits left iliac crest is higher than the right;  Lateral view demonstrates increased posterior pelvic tilted position  Single Leg Stance (right): ~3 seconds; femoral internal rotation and adduction   Single Leg Stance (left): <1 second; femoral internal rotation and adduction with Trendelenberg hip drop    Lumbar AROM Pain/Dysfunction with movement   Flexion  (45-50 degrees) ~40 degrees Thoracic and lumbar dominant movement strategy   Extension  (25 degrees) 25 " "degrees Increased central lumbar pain     Hip Passive Range of Motion   Right Left   Flexion (120 degrees) 85 degrees 85 degrees   Internal Rotation (30) 20 degrees 20 degrees   External Rotation (45) 40 degrees 30 degrees     Special Testing  Marty Test    Slump Test -   Straight-Leg Raise (+) on the left side      Ian received therapeutic exercises to develop strength, endurance, ROM, flexibility, posture, and core stabilization for 40 minutes including:  Upright Bike, 7 minutes, Level 3, Hill Setting, Seat 9  Seated Slump Gliders, 15x both affected and non-affected side  Bridges, 3x8  Supine Thoracic Extension Self-Mobilizations with FR, 10x5" hold  Quadruped Rockback with Hip Flexion (hand-heel rock)    Ian received the following manual therapy techniques: Joint mobilizations and Manual traction were applied for 15 inutes, including:  Lumbar L5-S1 Gapping Grade IV Mobilization  (L) Long-Axis Distraction, Grade IV  Objective measures taken (see above)      Ian participated in neuromuscular re-education activities to improve: Balance, Coordination, Kinesthetic, Sense, Proprioception, and Posture for 0 minutes. The following activities were included:      Ian participated in dynamic functional therapeutic activities to improve functional performance for 0  minutes, including:    Home Exercises Provided and Patient Education Provided     Education provided:   - Home exercise program     Written Home Exercises Provided: Patient instructed to cont prior HEP.  Exercises were reviewed and Ian was able to demonstrate them prior to the end of the session.  Ian demonstrated good  understanding of the education provided.     See EMR under Patient Instructions for exercises provided prior visit.    Assessment   Ian demonstrates adverse neural tension of the sciatic nerve on the left side compared to the right side during straight-leg raise test (~30 degrees of range of motion prior to onset of pain), that " improved to ~50 degrees after lumbar gapping mobilization and hip distraction, as Ian demonstrates restricted hip flexion, external rotation and internal rotation range of motion, bilaterally. Ian will benefit from a customized physical therapy plan of care  to address the aforementioned impairments in an effort to improve human function and quality of life.      Ian Is progressing well towards his goals.   Pt prognosis is Guarded.     Pt will continue to benefit from skilled outpatient physical therapy to address the deficits listed in the problem list box on initial evaluation, provide pt/family education and to maximize pt's level of independence in the home and community environment.     Pt's spiritual, cultural and educational needs considered and pt agreeable to plan of care and goals.     Anticipated barriers to physical therapy: Chronicity, Unknown Etiology, Hypersensitivity, Irritability of Symptoms, Pain Beliefs    Goals:    Short Term Goals: 2 weeks   1.) Patient will demonstrate independence in compliance and technique of home exercise program provided as per teach-back method of assessment. Not Met  2.) Patient will report a 25% improvement in wrist extension and flexion mobility with good carry-over in range of motion improvements. Met     Long Term Goals: 4 weeks   1.) Patient will demonstrate independence in compliance and technique of home exercise program provided as per teach-back method of assessment. Not Met  2.) Patient will report a 50% improvement in wrist extension and flexion mobility with good carry-over in range of motion improvements. Not Met  3.) Patient will demonstrate a 25% improvement as per FOTO score to demonstrate improvements in perceived functional ability, Not Met    Plan     Continue to progress as per plan of care.    Mag Funes, PT DPT  Board Certified in Orthopedic Physical Therapy

## 2024-05-22 NOTE — PROGRESS NOTES
[facility]  Occupational Therapy Treatment Note    Date: 5/22/2024  Name: Ian Cifuentes  Clinic Number: [id number]    Therapy Diagnosis:   Encounter Diagnoses   Name Primary?    Impaired sensation Yes    Pain and numbness of upper extremity      Physician: Krishna Garcia,*    Physician Orders: OT Eval and Treat  Medical Diagnosis: M25.50 (ICD-10-CM) - Arthralgia, unspecified joint   Surgical Procedure and Date: N/A / Date of Injury/Onset: 2021  Evaluation Date: 5/8/2024  Insurance Authorization Period Expiration: 12/31/2024  Plan of Care Expiration: 8/2/24 (12 weeks)  Date of Return to MD: date not set  Visit # / Visits authorized: 4 / 20 (Visit #2)   FOTO: 1/3    Precautions:  Standard and Diabetes HTN*, HLD     Time In: 9:05 am  Time Out: 9:52 am  Total Billable Time: 39 minutes    SUBJECTIVE     Pt reports: Hands cramp almost daily with repetitive use.   He was not fully compliant with home exercise program given last session.   Response to previous treatment: First treatment  Functional change: none noted yet    Pain: 0/10  Location: bilateral hands    OBJECTIVE   Objective Measures updated at progress report unless specified.    Observation/Appearance: Skin warm and dry.      Edema. None noted.      Elbow and Wrist ROM. WFLs - all planes of motion.      Hand ROM. Measured in degrees.    5/8/2024 5/8/2024     Left Right           Digits 2-5: WFLs WFLs           Thumb:              Opposition Touch to SF P3 Touch to RF P3       Strength (Dynamometer) and Pinch Strength (Pinch Gauge)  Measured in pounds to POP.    5/8/2024 5/8/2024     Left Right   Rung II 76 72   Key Pinch 19 20   3pt Pinch 13 12   2pt Pinch 12 14      Sensation. Pt endorses intermittent numbness and tingling in BUE(s) following prolonged time in one position.     5/8/2024 5/8/2024     Left Right   [place] Bradley       Normal 1.65-2.83 X   LF, RF, SF X  IF, LF, RF, SF   Diminished Light Touch 3.22-3.61  X   IF, thumb X   thumb   Diminished Protective 3.84-4.31       Loss of Protective 4.56-6.65       Untestable >6.65       2 Point Discrimination       Static       Dynamic          Manual Muscle Test    5/8/2024 5/8/2024     Left Right   Wrist Extension  5/5 5/5   Wrist Flexion 5/5 5/5   Radial Deviation 5/5 5/5   Ulnar Deviation 5/5 5/5   Supination 5/5 5/5   Pronation 5/5 5/5   Elbow Extension 5/5 5/5   Elbow Flexion 5/5 5/5      Special Tests  Phalen's Test - B/L   Segovia's Test - B/L   Durkan's Test - B/L        Treatment     Ian received the treatments listed below:     Supervised modalities after being cleared for contradictions: Paraffin bath - with moist heat pack to B/L hand(s) for 8 minutes to increase blood flow, circulation, pain management, and for tissue elasticity prior to therex.     Therapeutic exercises bilaterally to develop endurance, ROM, flexibility, and posture for 23 minutes, including:  - Diaphragmatic breathing 2 x 10 reps   - Median/Ulnar nerve glides x 10 reps     - ASTYM stretch positions 1-3, x 30 secs each per arm    Neuromuscular re-education activities to improve Kinesthetic, Sense, and Proprioception for 16 minutes. The following activities were included:  - Laterality training for cortical re-mapping of UEs x 10 min  - Chance balance x 2 min for proprioception   - Desensitization with towel x 2 min    Patient Education and Home Exercises      Education provided:   - HEP in place  - Progress towards goals     Written Home Exercises Provided: Patient instructed to cont prior HEP.  Exercises were reviewed and Ian was able to demonstrate them prior to the end of the session.  Ian demonstrated good  understanding of the HEP provided. See EMR under Patient Instructions for exercises provided during therapy sessions.      ASSESSMENT     Pt returns for his first follow-up visit this date. He participated well and endorses fair adherence to HEP. Reports no change in symptoms at this  time. Will progress as tolerated.     Ian is progressing well towards his goals and there are no updates to goals at this time. Pt prognosis is Guarded.     Pt will continue to benefit from skilled outpatient occupational therapy to address the deficits listed in the problem list on initial evaluation, provide pt/family education and to maximize pt's level of independence in the home and community environment.     Pt's spiritual, cultural and educational needs considered and pt agreeable to plan of care and goals.    Anticipated barriers to occupational therapy: Chronicity, Unknown Etiology, Hypersensitivity, Irritability of Symptoms, Pain Beliefs     Goals:   The following goals were discussed with the patient and patient is in agreement with them as to be addressed in the treatment plan.   Short Term Goals (STGs) = Long Term Goals (LTGs); to be met by discharge.  LTG #1: Pt will report a pain level of 3-4 out of 10 at worst with daily hand use. progressing not met 5/22/2024  LTG #2: Pt will demo improved FOTO score by 5-10 points. progressing not met 5/22/2024  LTG #3: Pt will return to prior level of function for ADLs and household management. progressing not met 5/22/2024  LTG #4: Pt will demonstrate independence with issued HEP and modalities for pain/symptom management. progressing not met 5/22/2024    PLAN     Continue skilled occupational therapy with individualized plan of care focusing on maximizing functional use of patient's bilateral arms.    Updates/Grading for next session: progress as tolerated.    Tameka Mendez, OTR/L

## 2024-05-29 ENCOUNTER — CLINICAL SUPPORT (OUTPATIENT)
Dept: REHABILITATION | Facility: HOSPITAL | Age: 59
End: 2024-05-29
Attending: STUDENT IN AN ORGANIZED HEALTH CARE EDUCATION/TRAINING PROGRAM
Payer: COMMERCIAL

## 2024-05-29 ENCOUNTER — CLINICAL SUPPORT (OUTPATIENT)
Dept: REHABILITATION | Facility: HOSPITAL | Age: 59
End: 2024-05-29
Payer: COMMERCIAL

## 2024-05-29 DIAGNOSIS — M79.7 FIBROMYALGIA: ICD-10-CM

## 2024-05-29 DIAGNOSIS — M79.603 PAIN AND NUMBNESS OF UPPER EXTREMITY: ICD-10-CM

## 2024-05-29 DIAGNOSIS — R20.1 IMPAIRED SENSATION: Primary | ICD-10-CM

## 2024-05-29 DIAGNOSIS — R20.0 PAIN AND NUMBNESS OF UPPER EXTREMITY: ICD-10-CM

## 2024-05-29 PROCEDURE — 97110 THERAPEUTIC EXERCISES: CPT

## 2024-05-29 PROCEDURE — 97112 NEUROMUSCULAR REEDUCATION: CPT

## 2024-05-29 PROCEDURE — 97018 PARAFFIN BATH THERAPY: CPT

## 2024-05-29 PROCEDURE — 97140 MANUAL THERAPY 1/> REGIONS: CPT

## 2024-05-29 NOTE — PROGRESS NOTES
[facility]  Occupational Therapy Treatment Note    Date: 5/29/2024  Name: Ian Cifuentes  Clinic Number: [id number]    Therapy Diagnosis:   Encounter Diagnoses   Name Primary?    Impaired sensation Yes    Pain and numbness of upper extremity      Physician: Krishna Garcia,*    Physician Orders: OT Eval and Treat  Medical Diagnosis: M25.50 (ICD-10-CM) - Arthralgia, unspecified joint   Surgical Procedure and Date: N/A / Date of Injury/Onset: 2021  Evaluation Date: 5/8/2024  Insurance Authorization Period Expiration: 12/31/2024  Plan of Care Expiration: 8/2/24 (12 weeks)  Date of Return to MD: date not set  Visit # / Visits authorized: 5 / 20 (Visit #3)   FOTO: 1/3    Precautions:  Standard and Diabetes HTN*, HLD     Time In: 9:05 am  Time Out: 9:53 am  Total Billable Time: 40 minutes    SUBJECTIVE     Pt reports: Pain comes and goes. Shooting pains in right side, soreness in left.    He was not fully compliant with home exercise program given last session.   Response to previous treatment: no adverse effects   Functional change: none noted yet    Pain: yes, not rated /10  Location: bilateral arms     OBJECTIVE   Objective Measures updated at progress report unless specified.    Observation/Appearance: Skin warm and dry.      Edema. None noted.      Elbow and Wrist ROM. WFLs - all planes of motion.      Hand ROM. Measured in degrees.    5/8/2024 5/8/2024     Left Right           Digits 2-5: WFLs WFLs           Thumb:              Opposition Touch to SF P3 Touch to RF P3       Strength (Dynamometer) and Pinch Strength (Pinch Gauge)  Measured in pounds to POP.    5/8/2024 5/8/2024     Left Right   Rung II 76 72   Key Pinch 19 20   3pt Pinch 13 12   2pt Pinch 12 14      Sensation. Pt endorses intermittent numbness and tingling in BUE(s) following prolonged time in one position.     5/8/2024 5/8/2024     Left Right   [place] Bradley       Normal 1.65-2.83 X   LF, RF, SF X  IF,  LF, RF, SF   Diminished Light Touch 3.22-3.61 X   IF, thumb X   thumb   Diminished Protective 3.84-4.31       Loss of Protective 4.56-6.65       Untestable >6.65       2 Point Discrimination       Static       Dynamic          Manual Muscle Test    5/8/2024 5/8/2024     Left Right   Wrist Extension  5/5 5/5   Wrist Flexion 5/5 5/5   Radial Deviation 5/5 5/5   Ulnar Deviation 5/5 5/5   Supination 5/5 5/5   Pronation 5/5 5/5   Elbow Extension 5/5 5/5   Elbow Flexion 5/5 5/5      Special Tests  Phalen's Test - B/L   Segovia's Test - B/L   Durkan's Test - B/L        Treatment     Ian received the treatments listed below:     Supervised modalities after being cleared for contradictions: Paraffin bath - with moist heat pack to B/L hand(s) for 8 minutes to increase blood flow, circulation, pain management, and for tissue elasticity prior to therex.     Therapeutic exercises bilaterally to develop endurance, ROM, flexibility, and posture for 28 minutes, including:  - Diaphragmatic breathing 2 x 10 reps   - B/L scapular exercises: retraction, elevation/depression, forward rolls, backward rolls x 10 each  - Neck AROM: ear to shoulder neck stretch, head flexion/extension, head retraction/protraction, x 10 reps each  - Median/Ulnar nerve glides 2 x 10 reps     - ASTYM stretch positions 1-3, 2 x 30 secs each per arm    - UBE on 2.0 resistance x 6 min forward/back directions to increase UE strength and endurance. Pt educated on posture and breathing techniques throughout.     Neuromuscular re-education activities to improve Kinesthetic, Sense, and Proprioception for 12 minutes. The following activities were included:  - Laterality training for cortical re-mapping of UEs x 2 sets   - Chance balance x 2 min for proprioception   - Desensitization with towel x 2 min    Patient Education and Home Exercises      Education provided:   - HEP in place  - Progress towards goals     Written Home Exercises Provided: Patient instructed to cont  prior HEP.  Exercises were reviewed and Ian was able to demonstrate them prior to the end of the session.  Ian demonstrated good  understanding of the HEP provided. See EMR under Patient Instructions for exercises provided during therapy sessions.      ASSESSMENT     Pt participated well and endorses fair adherence to HEP. Reports no change in symptoms at this time. Will progress as tolerated.     Ian is progressing well towards his goals and there are no updates to goals at this time. Pt prognosis is Guarded.     Pt will continue to benefit from skilled outpatient occupational therapy to address the deficits listed in the problem list on initial evaluation, provide pt/family education and to maximize pt's level of independence in the home and community environment.     Pt's spiritual, cultural and educational needs considered and pt agreeable to plan of care and goals.    Anticipated barriers to occupational therapy: Chronicity, Unknown Etiology, Hypersensitivity, Irritability of Symptoms, Pain Beliefs     Goals:   The following goals were discussed with the patient and patient is in agreement with them as to be addressed in the treatment plan.   Short Term Goals (STGs) = Long Term Goals (LTGs); to be met by discharge.  LTG #1: Pt will report a pain level of 3-4 out of 10 at worst with daily hand use. progressing not met 5/29/2024  LTG #2: Pt will demo improved FOTO score by 5-10 points. progressing not met 5/29/2024  LTG #3: Pt will return to prior level of function for ADLs and household management. progressing not met 5/29/2024  LTG #4: Pt will demonstrate independence with issued HEP and modalities for pain/symptom management. progressing not met 5/29/2024    PLAN     Continue skilled occupational therapy with individualized plan of care focusing on maximizing functional use of patient's bilateral arms.    Updates/Grading for next session: progress as tolerated.    Tameka Mendez, OTR/L

## 2024-05-29 NOTE — PROGRESS NOTES
Physical Therapy Daily Treatment and Progress Note     Name: Ian Cifuentes  Clinic Number: [id number]    Therapy Diagnosis:   Encounter Diagnoses   Name Primary?    Impaired sensation Yes    Pain and numbness of upper extremity     Fibromyalgia      Physician: Krishna Garcia,*  Visit Date: 5/29/2024  Physician: Krishna Garcia,*  Physician Orders: PT Eval and Treat   Medical Diagnosis from Referral:   M79.7 (ICD-10-CM) - Fibromyalgia   M25.50 (ICD-10-CM) - Arthralgia, unspecified joint   Evaluation Date: 4/1/2024  Authorization Period Expiration: 12/31/2024  Plan of Care Expiration: 5/1/2024 *Update to 6/22/2024  Progress Note Due: 6/8/2024  Most Recent Progress Note: 5/8/2024  Date of Surgery: None  Visit # / Visits authorized: 6/ 20   Time In: 9:55 am  Time Out: 10:55 am  Total Billable Time: 60 minutes  Precautions: Standard and Diabetes HTN*, HLD  Subjective   Pt reports: the left leg from the hip joint down to the back of the thigh has been hurting for a few days, and he notes that when he sits for awhile, it gets worse. It will subside but it hasn't gone away completely.  He was compliant with home exercise program.  Response to previous treatment: Initial evaluation  Functional change: Ongoing; improved pain  Pain: No number provided/10  Location: Bilateral hands and elbows   Objective   Hip Passive Range of Motion   Right Left   Flexion (120 degrees) 85 degrees 85 degrees   Internal Rotation (30) 20 degrees 20 degrees   External Rotation (45) 40 degrees 30 degrees     Special Testing  Marty Test -   Slump Test -     Ian received therapeutic exercises to develop strength, endurance, ROM, flexibility, posture, and core stabilization for 32 minutes including:  Upright Bike, 7 minutes, Level 3, Hill Setting, Seat 9  Quadruped Rockback with Hip Flexion (hand-heel rock)  Shuttle Double Leg Press, 75#, 3 minutes  Shuttle Single Leg Press, 50# on each leg, 2 minutes each side    Ian received  "the following manual therapy techniques: Joint mobilizations and Manual traction were applied for 10 inutes, including:  (L) Long-Axis Distraction, Grade IV  Objective measures taken (see above)    Ian participated in neuromuscular re-education activities to improve: Balance, Coordination, Kinesthetic, Sense, Proprioception, and Posture for 18 minutes. The following activities were included:  Bridges, 2x12, 3" hold  Clamshells, 2x12, red band, 3" hold  Paloff Press, 10#, 10x3" hold each side    Ian participated in dynamic functional therapeutic activities to improve functional performance for 0  minutes, including:    Home Exercises Provided and Patient Education Provided     Education provided:   - Home exercise program     Written Home Exercises Provided: Patient instructed to cont prior HEP.  Exercises were reviewed and Ian was able to demonstrate them prior to the end of the session.  Ian demonstrated good  understanding of the education provided.     See EMR under Patient Instructions for exercises provided prior visit.    Assessment   Ian demonstrates adverse neural tension of the sciatic nerve on the left side compared to the right side during Slump Test, but no concordant symptoms. He does demonstrate restricted hip flexion range of motion, left side greater than the right side and this is addressed with manual therapy to improve flexion, internal rotation and general mobility. Exercise for trunk and gluteal stability was progressed today without increase in pain.     Ian Is progressing well towards his goals.   Pt prognosis is Guarded.     Pt will continue to benefit from skilled outpatient physical therapy to address the deficits listed in the problem list box on initial evaluation, provide pt/family education and to maximize pt's level of independence in the home and community environment.     Pt's spiritual, cultural and educational needs considered and pt agreeable to plan of care and " goals.     Anticipated barriers to physical therapy: Chronicity, Unknown Etiology, Hypersensitivity, Irritability of Symptoms, Pain Beliefs    Goals:    Short Term Goals: 2 weeks   1.) Patient will demonstrate independence in compliance and technique of home exercise program provided as per teach-back method of assessment. Not Met  2.) Patient will report a 25% improvement in wrist extension and flexion mobility with good carry-over in range of motion improvements. Met     Long Term Goals: 4 weeks   1.) Patient will demonstrate independence in compliance and technique of home exercise program provided as per teach-back method of assessment. Not Met  2.) Patient will report a 50% improvement in wrist extension and flexion mobility with good carry-over in range of motion improvements. Not Met  3.) Patient will demonstrate a 25% improvement as per FOTO score to demonstrate improvements in perceived functional ability, Not Met    Plan     Continue to progress as per plan of care.    Mag Funes, PT DPT  Board Certified in Orthopedic Physical Therapy

## 2024-05-30 ENCOUNTER — OFFICE VISIT (OUTPATIENT)
Dept: PAIN MEDICINE | Facility: CLINIC | Age: 59
End: 2024-05-30
Payer: COMMERCIAL

## 2024-05-30 VITALS
WEIGHT: 255.5 LBS | SYSTOLIC BLOOD PRESSURE: 127 MMHG | HEART RATE: 60 BPM | DIASTOLIC BLOOD PRESSURE: 85 MMHG | BODY MASS INDEX: 33.86 KG/M2 | HEIGHT: 73 IN

## 2024-05-30 DIAGNOSIS — M79.7 FIBROMYALGIA: Primary | ICD-10-CM

## 2024-05-30 DIAGNOSIS — M25.50 ARTHRALGIA, UNSPECIFIED JOINT: ICD-10-CM

## 2024-05-30 DIAGNOSIS — M79.10 MYALGIA: ICD-10-CM

## 2024-05-30 PROCEDURE — 1159F MED LIST DOCD IN RCRD: CPT | Mod: CPTII,S$GLB,, | Performed by: STUDENT IN AN ORGANIZED HEALTH CARE EDUCATION/TRAINING PROGRAM

## 2024-05-30 PROCEDURE — 99214 OFFICE O/P EST MOD 30 MIN: CPT | Mod: S$GLB,,, | Performed by: STUDENT IN AN ORGANIZED HEALTH CARE EDUCATION/TRAINING PROGRAM

## 2024-05-30 PROCEDURE — 3052F HG A1C>EQUAL 8.0%<EQUAL 9.0%: CPT | Mod: CPTII,S$GLB,, | Performed by: STUDENT IN AN ORGANIZED HEALTH CARE EDUCATION/TRAINING PROGRAM

## 2024-05-30 PROCEDURE — 3079F DIAST BP 80-89 MM HG: CPT | Mod: CPTII,S$GLB,, | Performed by: STUDENT IN AN ORGANIZED HEALTH CARE EDUCATION/TRAINING PROGRAM

## 2024-05-30 PROCEDURE — 99999 PR PBB SHADOW E&M-EST. PATIENT-LVL III: CPT | Mod: PBBFAC,,, | Performed by: STUDENT IN AN ORGANIZED HEALTH CARE EDUCATION/TRAINING PROGRAM

## 2024-05-30 PROCEDURE — 3008F BODY MASS INDEX DOCD: CPT | Mod: CPTII,S$GLB,, | Performed by: STUDENT IN AN ORGANIZED HEALTH CARE EDUCATION/TRAINING PROGRAM

## 2024-05-30 PROCEDURE — 3074F SYST BP LT 130 MM HG: CPT | Mod: CPTII,S$GLB,, | Performed by: STUDENT IN AN ORGANIZED HEALTH CARE EDUCATION/TRAINING PROGRAM

## 2024-05-30 NOTE — PATIENT INSTRUCTIONS
Here are some supplements that may help in the treatment of chronic pain:    PEA or palmitoylethanolamide which is an over the counter nutritional supplement that can be purchased online or from a supplement store.  Palmitoylethanolamide (PEA) is an endogenous fatty acid amide, a naturally occurring compound that plays a significant role in intracellular signaling mechanisms. In both humans and animals, palmitoylethanolamide is produced primarily as a biological repair mechanism, and thus serves as an effective modulator of inflammation and chronic and/or nerve pain.  In animal studies the micronized or ultramicronized palmitoylethanolamide was found to be better than non-micronized, but there are no studies in humans to recommend one type over the other.  We recommend starting at a dose of 400mg twice a day.

## 2024-05-30 NOTE — PROGRESS NOTES
Chronic Pain - f/u    Referring Physician: No ref. provider found    Date: 05/30/2024     Re: Ian Cifuentes  MR#: [id number]  YOB: 1965  Age: 59 y.o.    Chief Complaint: body pain  No chief complaint on file.    **This note is dictated using the M*Modal Fluency Direct word recognition program. There are word recognition mistakes that are occasionally missed on review.**    ASSESSMENT: 59 y.o. year old male with body pain, consistent with     1. Fibromyalgia        2. Arthralgia, unspecified joint        3. Myalgia            PLAN:     Full body pain with arthralgia, myalgias, back pain, neck pain -   -unclear etiology. Rheumatology and neurology work-up negative.  Patient had been following with Dr. Moe at [facility].  He has had a cervical transforaminal injection without improvement.  He has diffuse full body pains that rotate around different joints and body parts.  The worst consistent pains are located in the neck and low back.  -could be chemical exposures while in the  or at work  -meets criteria for fibromyalgia  -failed gabapentin and Lyrica  -recommended functional restoration program, however, he cannot working to schedule at this time   -stopped Flexeril 5 mg (side effects with dizziness)  -recommended that he speak with Dr. Garcia to change his SSRI from Lexapro to the SNRI Cymbalta with a dose of 60mg daily.  -failed cervical medial branch block as scheduled with Dr. Moe  -physical exam was generally unremarkable aside from TTP  -He is very tight and clearly has some deconditioning.  He would benefit from a consistent exercise program. PT referral placed  -EMG showed mild CTS in 2023  -discussed PEA  -consider addition of lamictal or topiramate if Cymbalta not helpful.    DM2  -started on Ozempic  -on metformis  -A1c = 8.5    - RTC 4 months  - Counseled patient regarding the importance of weight loss and activity modification, constant sleeping habits,  and physical therapy.    The above plan and management options were discussed at length with patient. Patient is in agreement with the above and verbalized understanding. It will be communicated with the referring physician via electronic record, fax, or mail.  Lab/study reports reviewed were important and necessary because subsequent medical and treatment recommendations required review of the above lab/study reports. Images viewed/reviewed above were important and necessary because subsequent medical and treatment recommendations required review of the reviewed image(s).     Electronically signed by:  Krishna Garcia DO  05/30/2024    =========================================================================================================    SUBJECTIVE:    Interval History 5/30/2024:   Ian Cifuentes is a 59 y.o. male presents to the clinic for follow up.  Since last visit the pain has has slightly improved. The patient went to PT and that really helped the joint pains in the hands.      The pain is located in the whole body area and radiates to the upper and lower body .  The pain is described as aching, throbbing, and tingling    At BEST  2/10   At WORST  7/10 on the WORST day.    On average pain is rated as 2/10.   Today the pain is rated as 2/10  Symptoms interfere with daily activity.   Exacerbating factors: Sitting and Laying.    Mitigating factors physical therapy.     Current pain medications: tumeric, coq10, magnesium, lexapro  Failed Pain Medications: gabapentin (balance, dizziness), lyrica,  (balance, dizziness), meloxicam, ibuprofen, tylenol    Pain procedures:   09/2023 - Left C6 TFESI w/ Dr. Moe  09/2023 - Left S1 TFESI w/Dr. Moe  Cervical medial branch block - not helpful    Initial hx:  Ian Cifuentes is a 59 y.o. male presents to the clinic for the evaluation of body pain. The pain started 5 years ago following no inciting event and symptoms have been worsening.  He is  "following with Dr. Moe at [facility]. He is scheduled for a right C2-3 MBB on 1/10/24.  He has been seeing rheumatology and neurology for full body pain that     Back pain has some radiation down the left leg.  Back pain = leg pain.  Back pain is worse with sitting for > 30 minutes.  He also gets "shocks" in both arms with raising his arm above shoulder height.    He does not do any structured exercise program.  When he started seeing [facility], [facility]      He also has a lot of joint pains. In the wrists, knees, ankles.  These occur spontaneously and last for a short period of time and then go away.  They happen at random times throughout the day. Repetitive movements are bad.     Pain Description:    The pain is located in the Body and muscular / whole body area and radiates to the upper body to lower body .    At BEST  3/10   At WORST  7/10 on the WORST day.    On average pain is rated as 3/10.   Today the pain is rated as 3/10  The pain is continuous.  The pain is described as aching, throbbing, and tingling    Symptoms interfere with daily activity.   Exacerbating factors: Sitting and Laying.    Mitigating factors nothing.   He reports 5 hours of sleep per night. Broken sleep due to pain.    Physical Therapy/Home Exercise: No, not currently in physical therapy or home exercise program    Current Pain Medications:    - wellbutrin    Failed Pain Medications:    - gabapentin (balance, dizziness), lyrica (balance, dizziness), meloxicam, ibuprofen, tylenol    Pain Treatment Therapies:    Pain procedures:   09/2023 - Left C6 SARAH watson/ Dr. Moe  09/2023 - Left S1 SARAH watson/Dr. Moe  Physical Therapy: not recently  Chiropractor:  Not in a few years  Acupuncture: none  TENS unit: none  Spinal decompression: none  Joint replacement: none    Patient denies urinary incontinence and bowel incontinence.  Patient denies any suicidal or homicidal " ideations     report:  Reviewed and consistent with medication use as prescribed.    Imaging:   Cervical MRI 11/2023:    FINDINGS  No acute bony abnormality is identified.  There is chronic straightening of the cervical lordosis.    There is no cerebellar tonsil ectopia.  The spinal cord is normal in caliber and signal.  The disc spaces are partially desiccated multiple levels.    The patient has chronic, stable disc herniations at C3-C4, C5-C6 and C6-C7.  There is flattening of the cord contour at C5-C6, similar to prior study.  There is no central canal stenosis.    There is chronic asymmetry involving the right C2-C3 facet joint.  Bone edema and periarticular soft tissue edema is evident most notably on sagittal stir imaging.    SPECT scan findings:    There is moderately intense localized increased pharmaceutical activity involving the right C2-C3 facet joint.  The facet by CT is severely narrowed with periarticular sclerosis and facet hypertrophic bone formation.  No abnormality on bone scan imaging is identified elsewhere.     MRI lumbar 11/2023:    MRI findings:    No interval disc herniation or canal stenosis has developed.    There is chronic disc dessication with disc space narrowing most notably involving L5-S1.    No conus medullaris mass is identified.  The spinal cord terminates at the T12 level.    T12-L1:  Stable, posterior midline minimal disc bulge with annular fissure.    L1-L2:  Stable, posterior midline annular fissure.    L2-L3:  Chronic, broad-based posterior 2.1-mm disc herniation with annular fissure. Stable    L3-L4: The spinal canal and neural foramen are patent.  There is no disc bulge or herniation.  The disc is hydrated without loss of height.    L4-L5: The spinal canal and neural foramen are patent.  There is no disc bulge or herniation.  The disc is hydrated without loss of height.    L5-S1:  A chronic, broad-based posterior central 3.2 mm disc herniation is identified.  There is  mild flattening of the thecal sac contour.  There is no canal stenosis.    SPECT scan findings:     There is a small focus of moderately intense increased activity across the anterior inferior aspect of the T12 vertebral body.  This approaches the endplate surface.  No abnormality of the lumbar spine is identified.    Impression:    Stable, chronic findings compared to previous exam 08/21/2023.    There is a small focus of localized increased pharmaceutical activity involving the anterior inferior aspect of the T12 segment with extension to the endplates surface.  No bone scan abnormality of the lumbar spine identified.           5/30/2024     2:13 PM 12/14/2023     8:18 AM   Pain Disability Index (PDI)   Family/Home Responsibilities: 2 3   Recreation: 2 3   Occupation: 2 3   Sexual Behavior: 2 3   Self Care: 2 3   Life-Support Activities: 2 3   Pain Disability Index (PDI) 14 21        Past Medical History:   Diagnosis Date    Acid reflux      Past Surgical History:   Procedure Laterality Date    TONSILLECTOMY       Social History     Socioeconomic History    Marital status:    Tobacco Use    Smoking status: Never    Smokeless tobacco: Never   Substance and Sexual Activity    Alcohol use: Yes     Alcohol/week: 3.0 standard drinks of alcohol     Types: 3 Cans of beer per week     Comment: <1    Drug use: No    Sexual activity: Not Currently     Partners: Female     Family History   Problem Relation Name Age of Onset    Diabetes Mother Theresa     Diabetes Father Jaycob     Diabetes Sister Ria     Diabetes Brother Andres        Review of patient's allergies indicates:  No Known Allergies    Current Outpatient Medications   Medication Sig    ALPRAZolam (XANAX) 0.5 MG tablet Take one tablet by mouth 1 hour before procedure and bring other tablet with you to the procedure.    buPROPion (WELLBUTRIN XL) 150 MG TB24 tablet TAKE ONE TABLET BY MOUTH EVERY DAY    dextroamphetamine-amphetamine (ADDERALL XR) 20 MG 24 hr  capsule Take 1 capsule (20 mg total) by mouth every morning.    EScitalopram oxalate (LEXAPRO) 20 MG tablet Take 1 tablet (20 mg total) by mouth once daily.    metFORMIN (GLUCOPHAGE-XR) 500 MG ER 24hr tablet Take 1 tablet (500 mg total) by mouth 2 (two) times daily with meals.    pantoprazole (PROTONIX) 40 MG tablet Take 1 tablet (40 mg total) by mouth once daily.    rosuvastatin (CRESTOR) 20 MG tablet TAKE ONE TABLET BY MOUTH EVERY DAY    semaglutide (OZEMPIC) 0.25 mg or 0.5 mg (2 mg/3 mL) pen injector Inject 0.25 mg into the skin every 7 days.    traZODone (DESYREL) 100 MG tablet Take 1 tablet (100 mg total) by mouth every evening.     Current Facility-Administered Medications   Medication    LIDOcaine HCL 10 mg/ml (1%) injection 1 mL    LIDOcaine-EPINEPHrine 1%-1:100,000 30 mL, LIDOcaine HCL 10 mg/ml (1%) 20 mL, sodium bicarbonate 10 mL in sodium chloride 0.9% 500 mL solution       REVIEW OF SYSTEMS:    GENERAL:  No weight loss, malaise or fevers.: No  HEENT:   No recent changes in vision or hearing:NO  NECK:  Negative for lumps, no difficulty with swallowing.NO  RESPIRATORY:  Negative for cough, wheezing or shortness of breath, patient denies any recent URI.:NO  CARDIOVASCULAR:  Negative for chest pain, leg swelling or palpitations.YES CHEST PAIN  GI:  Negative for abdominal discomfort, blood in stools or black stools or change in bowel habits.:NO  MUSCULOSKELETAL:  See HPI.  SKIN:  Negative for lesions, rash, and itching.NO  PSYCH:  No mood disorder or recent psychosocial stressors.  Patients sleep is not disturbed secondary to pain.:YES  HEMATOLOGY/LYMPHOLOGY:  Negative for prolonged bleeding, bruising easily or swollen nodes.  Patient is not currently taking any anti-coagulants:NO  NEURO:   No history of headaches, syncope, paralysis, seizures or tremors.:YES HEADACHES  All other reviewed and negative other than HPI.    OBJECTIVE:    /85 (BP Location: Right arm, Patient Position: Sitting)   Pulse 60    "Ht 6' 1" (1.854 m)   Wt 115.9 kg (255 lb 8.2 oz)   BMI 33.71 kg/m²     PHYSICAL EXAMINATION:    GENERAL: Well appearing, in no acute distress, alert and oriented x3.  PSYCH:  Mood and affect appropriate.  SKIN: Skin color, texture, turgor normal, no rashes or lesions.  HEAD/FACE:  Normocephalic, atraumatic. Cranial nerves grossly intact.    NECK:   - Positive pain to palpation over the cervical paraspinous muscles.   - Spurling  Negative.  - Negative pain with neck extension    CV: RRR with palpation of the radial artery.  PULM: CTAB. No evidence of respiratory difficulty, symmetric chest rise.  GI:  Soft and non-tender.    BACK:   - No obvious deformity or signs of trauma, Normal lumbar lordotic curve  - Negative spinous process tenderness  - Positive paravertebral tenderness  - Negative pain to palpation over the facet joints of the lumbar spine.   - Negative QL / Iliac crest / Glut tenderness  - Slump test is Negative for radicular pain  - Slump test is Negative for back pain  - Supine Straight leg raising is Negative for radicular pain  - Supine Straight leg raising is Negative for back pain  - Lumbar ROM is normal in Flexion without pain  - Lumbar ROM is normal in Extension with pain  - Lumbar ROM is normal in Lateral Flexion with pain  - Negative Sustained Hip Flexion test (for discogenic pain)  - Negative Altered Gait, Posture  - Axial facet loading test Negative on the bilateral side(s)    SI Joint exam:  - Negative SI joint tenderness to palpation  - Yassine's sign Negative  - Yeoman's Test: Did not perform for SI joint pain indicating anterior SI ligament involvement. Did not perform for anterior thigh pain/paresthesia which indicates femoral nerve stretch.  - Gaenslen's Test:Negative  - Finger Gio's Sign:Negative  - SI compression test:Negative  - SI distraction test:Negative  - Thigh Thrust: Negative  - SI Thrust: Did not perform    MUSKULOSKELETAL:    EXTREMITIES:   Hip Exam:  - Log Roll " Negative  - FADIR Negative  - [facility] Unable to Perform  - Hip Scour Did not perform  - GTB Tenderness Negative    MUSCULOSKELETAL:  No atrophy or tone abnormalities are noted in the UE or LE.  No deformities, edema, or skin discoloration are noted on visible skin. Good capillary refill.    NEURO: Bilateral upper and lower extremity coordination and muscle stretch reflexes are physiologic and symmetric.      NEUROLOGICAL EXAM:  MENTAL STATUS: A x O x 3, good concentration, speech is fluent and goal directed  MEMORY: recent and remote are intact  CN: CN2-12 grossly intact  MOTOR: 5/5 in all muscle groups  DTRs: 1+ intact symmetric  Sensation:    -no Loss of sensation in a left upper, left lower, right upper, and right lower  arms and legs  distribution.  Babinski: absent on the bilateral side(s)  Childress: absent on the bilateral side(s)  Clonus: absent on the bilateral side(s)    GAIT: normal.

## 2024-06-05 ENCOUNTER — OFFICE VISIT (OUTPATIENT)
Dept: PRIMARY CARE CLINIC | Facility: CLINIC | Age: 59
End: 2024-06-05
Payer: COMMERCIAL

## 2024-06-05 ENCOUNTER — LAB VISIT (OUTPATIENT)
Dept: LAB | Facility: HOSPITAL | Age: 59
End: 2024-06-05
Attending: INTERNAL MEDICINE
Payer: COMMERCIAL

## 2024-06-05 VITALS
HEART RATE: 67 BPM | OXYGEN SATURATION: 96 % | WEIGHT: 256.63 LBS | RESPIRATION RATE: 18 BRPM | BODY MASS INDEX: 34.01 KG/M2 | SYSTOLIC BLOOD PRESSURE: 140 MMHG | DIASTOLIC BLOOD PRESSURE: 82 MMHG | TEMPERATURE: 97 F | HEIGHT: 73 IN

## 2024-06-05 DIAGNOSIS — F33.0 DEPRESSION, MAJOR, RECURRENT, MILD: ICD-10-CM

## 2024-06-05 DIAGNOSIS — F90.0 ATTENTION DEFICIT HYPERACTIVITY DISORDER (ADHD), PREDOMINANTLY INATTENTIVE TYPE: ICD-10-CM

## 2024-06-05 DIAGNOSIS — G47.33 OSA (OBSTRUCTIVE SLEEP APNEA): ICD-10-CM

## 2024-06-05 DIAGNOSIS — E11.9 DIABETES MELLITUS WITHOUT COMPLICATION: ICD-10-CM

## 2024-06-05 DIAGNOSIS — E78.2 MIXED HYPERLIPIDEMIA: ICD-10-CM

## 2024-06-05 DIAGNOSIS — E11.9 DIABETES MELLITUS WITHOUT COMPLICATION: Primary | ICD-10-CM

## 2024-06-05 LAB
ESTIMATED AVG GLUCOSE: 154 MG/DL (ref 68–131)
HBA1C MFR BLD: 7 % (ref 4–5.6)

## 2024-06-05 PROCEDURE — 1159F MED LIST DOCD IN RCRD: CPT | Mod: CPTII,S$GLB,, | Performed by: INTERNAL MEDICINE

## 2024-06-05 PROCEDURE — 83036 HEMOGLOBIN GLYCOSYLATED A1C: CPT | Performed by: INTERNAL MEDICINE

## 2024-06-05 PROCEDURE — 99214 OFFICE O/P EST MOD 30 MIN: CPT | Mod: S$GLB,,, | Performed by: INTERNAL MEDICINE

## 2024-06-05 PROCEDURE — 3079F DIAST BP 80-89 MM HG: CPT | Mod: CPTII,S$GLB,, | Performed by: INTERNAL MEDICINE

## 2024-06-05 PROCEDURE — 36415 COLL VENOUS BLD VENIPUNCTURE: CPT | Performed by: INTERNAL MEDICINE

## 2024-06-05 PROCEDURE — 3077F SYST BP >= 140 MM HG: CPT | Mod: CPTII,S$GLB,, | Performed by: INTERNAL MEDICINE

## 2024-06-05 PROCEDURE — 3008F BODY MASS INDEX DOCD: CPT | Mod: CPTII,S$GLB,, | Performed by: INTERNAL MEDICINE

## 2024-06-05 PROCEDURE — 3052F HG A1C>EQUAL 8.0%<EQUAL 9.0%: CPT | Mod: CPTII,S$GLB,, | Performed by: INTERNAL MEDICINE

## 2024-06-05 PROCEDURE — 1160F RVW MEDS BY RX/DR IN RCRD: CPT | Mod: CPTII,S$GLB,, | Performed by: INTERNAL MEDICINE

## 2024-06-05 PROCEDURE — 99999 PR PBB SHADOW E&M-EST. PATIENT-LVL V: CPT | Mod: PBBFAC,,, | Performed by: INTERNAL MEDICINE

## 2024-06-05 RX ORDER — DULOXETIN HYDROCHLORIDE 30 MG/1
30 CAPSULE, DELAYED RELEASE ORAL DAILY
Qty: 30 CAPSULE | Refills: 0 | Status: SHIPPED | OUTPATIENT
Start: 2024-06-05 | End: 2025-06-05

## 2024-06-05 NOTE — PROGRESS NOTES
[facility] Note    Chief Complaint      Chief Complaint   Patient presents with    Follow-up     3M       History of Present Illness      Ian Cifuentes is a 59 y.o. male who presents today for   Chief Complaint   Patient presents with    Follow-up     3M   .  Patient comes to appointment here for 3 m checkup for chonic issues as below .     HPI    No problem-specific Assessment & Plan notes found for this encounter.       Problem List Items Addressed This Visit          Psychiatric    Depression, major, recurrent, mild    Overview     Cont lexapro and welbutrin .          Attention deficit hyperactivity disorder (ADHD), predominantly inattentive type    Overview     Is not needing adderall current is doing well without it             Cardiac/Vascular    Mixed hyperlipidemia    Overview      crestor tolerating cont current             Endocrine    Diabetes mellitus without complication - Primary    Overview      a1c high at 8.0 ,he is compliant with meds .. Will repeat A1c .            Other    NEETA (obstructive sleep apnea)    Overview     Has had sinus surgery with dr kapoor resolved problem              Past Medical History:  Past Medical History:   Diagnosis Date    Acid reflux        Past Surgical History:  Past Surgical History:   Procedure Laterality Date    TONSILLECTOMY         Family History:  family history includes Diabetes in his brother, father, mother, and sister.     Social History:  Social History     Socioeconomic History    Marital status:    Tobacco Use    Smoking status: Never    Smokeless tobacco: Never   Substance and Sexual Activity    Alcohol use: Yes     Alcohol/week: 3.0 standard drinks of alcohol     Types: 3 Cans of beer per week     Comment: <1    Drug use: No    Sexual activity: Not Currently     Partners: Female       Review of Systems:   Review of Systems   Constitutional:  Negative for fever and weight loss.   HENT:  Negative for congestion,  hearing loss and sore throat.    Eyes:  Negative for blurred vision.   Respiratory:  Negative for cough and shortness of breath.    Cardiovascular:  Negative for chest pain, palpitations, claudication and leg swelling.   Gastrointestinal:  Negative for abdominal pain, constipation, diarrhea and heartburn.   Genitourinary:  Negative for dysuria.   Musculoskeletal:  Negative for back pain and myalgias.   Skin:  Negative for rash.   Neurological:  Negative for focal weakness and headaches.   Psychiatric/Behavioral:  Negative for depression and suicidal ideas. The patient is not nervous/anxious.         Medications:  Outpatient Encounter Medications as of 6/5/2024   Medication Sig Dispense Refill    ALPRAZolam (XANAX) 0.5 MG tablet Take one tablet by mouth 1 hour before procedure and bring other tablet with you to the procedure. 2 tablet 0    buPROPion (WELLBUTRIN XL) 150 MG TB24 tablet TAKE ONE TABLET BY MOUTH EVERY DAY 90 tablet 3    dextroamphetamine-amphetamine (ADDERALL XR) 20 MG 24 hr capsule Take 1 capsule (20 mg total) by mouth every morning. 30 capsule 0    EScitalopram oxalate (LEXAPRO) 20 MG tablet Take 1 tablet (20 mg total) by mouth once daily. 30 tablet 5    metFORMIN (GLUCOPHAGE-XR) 500 MG ER 24hr tablet Take 1 tablet (500 mg total) by mouth 2 (two) times daily with meals. 180 tablet 3    pantoprazole (PROTONIX) 40 MG tablet Take 1 tablet (40 mg total) by mouth once daily. 90 tablet 3    rosuvastatin (CRESTOR) 20 MG tablet TAKE ONE TABLET BY MOUTH EVERY DAY 90 tablet 3    semaglutide (OZEMPIC) 0.25 mg or 0.5 mg (2 mg/3 mL) pen injector Inject 0.25 mg into the skin every 7 days. 1 each 0    traZODone (DESYREL) 100 MG tablet Take 1 tablet (100 mg total) by mouth every evening. 30 tablet 3     Facility-Administered Encounter Medications as of 6/5/2024   Medication Dose Route Frequency Provider Last Rate Last Admin    LIDOcaine HCL 10 mg/ml (1%) injection 1 mL  1 mL Other 1 time in Clinic/HOD Demetrio  "Aryan JEREZ MD        LIDOcaine-EPINEPHrine 1%-1:100,000 30 mL, LIDOcaine HCL 10 mg/ml (1%) 20 mL, sodium bicarbonate 10 mL in sodium chloride 0.9% 500 mL solution   MISCELLANEOUS 1 time in Clinic/HOD Aryan Atkinson MD           Allergies:  Review of patient's allergies indicates:  No Known Allergies      Physical Exam      Vitals:    06/05/24 0916   BP: (!) 140/82   Pulse: 67   Resp: 18   Temp: 97 °F (36.1 °C)        Vital Signs  Temp: 97 °F (36.1 °C)  Temp Source: Oral  Pulse: 67  Resp: 18  SpO2: 96 %  BP: (!) 140/82  BP Location: Right arm  Patient Position: Sitting  Pain Score: 0-No pain  Height and Weight  Height: 6' 1" (185.4 cm)  Weight: 116.4 kg (256 lb 9.9 oz)  BSA (Calculated - sq m): 2.45 sq meters  BMI (Calculated): 33.9  Weight in (lb) to have BMI = 25: 189.1]     Body mass index is 33.86 kg/m².    Physical Exam  Constitutional:       Appearance: He is well-developed.   HENT:      Head: Normocephalic.   Eyes:      Pupils: Pupils are equal, round, and reactive to light.   Neck:      Thyroid: No thyromegaly.   Cardiovascular:      Rate and Rhythm: Normal rate and regular rhythm.      Heart sounds: No murmur heard.     No friction rub. No gallop.   Pulmonary:      Effort: Pulmonary effort is normal.      Breath sounds: Normal breath sounds.   Abdominal:      General: Bowel sounds are normal.      Palpations: Abdomen is soft.   Musculoskeletal:         General: Normal range of motion.      Cervical back: Normal range of motion.   Skin:     General: Skin is warm and dry.   Neurological:      Mental Status: He is alert and oriented to person, place, and time.      Sensory: No sensory deficit.   Psychiatric:         Behavior: Behavior normal.          Laboratory:  CBC:  No results for input(s): "WBC", "RBC", "HGB", "HCT", "PLT", "MCV", "MCH", "MCHC" in the last 2160 hours.  CMP:  No results for input(s): "GLU", "CALCIUM", "ALBUMIN", "PROT", "NA", "K", "CO2", "CL", "BUN", "ALKPHOS", "ALT", "AST", " ""BILITOT" in the last 2160 hours.    Invalid input(s): "CREATININ"  URINALYSIS:  No results for input(s): "COLORU", "CLARITYU", "SPECGRAV", "PHUR", "PROTEINUA", "GLUCOSEU", "BILIRUBINCON", "BLOODU", "WBCU", "RBCU", "BACTERIA", "MUCUS", "NITRITE", "LEUKOCYTESUR", "UROBILINOGEN", "HYALINECASTS" in the last 2160 hours.   LIPIDS:  No results for input(s): "TSH", "HDL", "CHOL", "TRIG", "LDLCALC", "CHOLHDL", "NONHDLCHOL", "TOTALCHOLEST" in the last 2160 hours.  TSH:  No results for input(s): "TSH" in the last 2160 hours.  A1C:  No results for input(s): "HGBA1C" in the last 2160 hours.    Radiology:        Assessment:     Ian Cifuentes is a 59 y.o.male with:    Diabetes mellitus without complication  -     Hemoglobin A1C; Future; Expected date: 06/05/2024    Mixed hyperlipidemia    NEETA (obstructive sleep apnea)    Depression, major, recurrent, mild    Attention deficit hyperactivity disorder (ADHD), predominantly inattentive type                Plan:     Problem List Items Addressed This Visit          Psychiatric    Depression, major, recurrent, mild    Overview     Cont lexapro and welbutrin .          Attention deficit hyperactivity disorder (ADHD), predominantly inattentive type    Overview     Is not needing adderall current is doing well without it             Cardiac/Vascular    Mixed hyperlipidemia    Overview      crestor tolerating cont current             Endocrine    Diabetes mellitus without complication - Primary    Overview      a1c high at 8.0 ,he is compliant with meds .. Will repeat A1c .            Other    NEETA (obstructive sleep apnea)    Overview     Has had sinus surgery with dr kapoor resolved problem             As above, continue current medications and maintain follow up with specialists.  Return to clinic in 3 months.      Frederick W Dantagnan [facility]                  "

## 2024-06-07 ENCOUNTER — PATIENT MESSAGE (OUTPATIENT)
Dept: PRIMARY CARE CLINIC | Facility: CLINIC | Age: 59
End: 2024-06-07
Payer: COMMERCIAL

## 2024-06-07 DIAGNOSIS — E11.9 DIABETES MELLITUS WITHOUT COMPLICATION: ICD-10-CM

## 2024-06-09 NOTE — TELEPHONE ENCOUNTER
No care due was identified.  [facility] Embedded Care Due Messages. Reference number: [id number].   6/09/2024 7:31:34 AM CDT

## 2024-06-09 NOTE — TELEPHONE ENCOUNTER
Pt states PCP was supposed to send in a rx for Ozempic to [facility] pharmacy at last visit June 5. I didn't see anything from visit notes, so I pended last rx for your review.

## 2024-06-17 ENCOUNTER — TELEPHONE (OUTPATIENT)
Dept: NEUROLOGY | Facility: CLINIC | Age: 59
End: 2024-06-17
Payer: COMMERCIAL

## 2024-06-17 NOTE — TELEPHONE ENCOUNTER
I spoke w/pt and I informed pt that I would reach out to the other facilities to see which one will be the sooner appt  and I will reach out to pt with the information   Pt is scd on 08/16/24 @ 12:30pm          Thank you                 ----- Message from [place] sent at 6/17/2024  1:39 PM CDT -----  .Type: Patient Call Back    Who called: Self     What is the request in detail:Would like to schedule EMG, but need it scheduled in [facility], or [facility]     Can the clinic reply by [facility]? No     Would the patient rather a call back or a response via [facility]? Call Back     Best call back number: [phone]      Additional Information:

## 2024-06-26 DIAGNOSIS — E11.9 TYPE 2 DIABETES MELLITUS WITHOUT COMPLICATION, UNSPECIFIED WHETHER LONG TERM INSULIN USE: ICD-10-CM

## 2024-07-10 ENCOUNTER — PATIENT MESSAGE (OUTPATIENT)
Dept: PAIN MEDICINE | Facility: CLINIC | Age: 59
End: 2024-07-10
Payer: COMMERCIAL

## 2024-07-16 ENCOUNTER — OFFICE VISIT (OUTPATIENT)
Dept: DERMATOLOGY | Facility: CLINIC | Age: 59
End: 2024-07-16
Payer: COMMERCIAL

## 2024-07-16 DIAGNOSIS — L29.9 BRACHIORADIAL PRURITUS: Primary | ICD-10-CM

## 2024-07-16 PROCEDURE — 99203 OFFICE O/P NEW LOW 30 MIN: CPT | Mod: S$GLB,,, | Performed by: STUDENT IN AN ORGANIZED HEALTH CARE EDUCATION/TRAINING PROGRAM

## 2024-07-16 PROCEDURE — 3051F HG A1C>EQUAL 7.0%<8.0%: CPT | Mod: CPTII,S$GLB,, | Performed by: STUDENT IN AN ORGANIZED HEALTH CARE EDUCATION/TRAINING PROGRAM

## 2024-07-16 PROCEDURE — 1159F MED LIST DOCD IN RCRD: CPT | Mod: CPTII,S$GLB,, | Performed by: STUDENT IN AN ORGANIZED HEALTH CARE EDUCATION/TRAINING PROGRAM

## 2024-07-16 PROCEDURE — 99999 PR PBB SHADOW E&M-EST. PATIENT-LVL II: CPT | Mod: PBBFAC,,, | Performed by: STUDENT IN AN ORGANIZED HEALTH CARE EDUCATION/TRAINING PROGRAM

## 2024-07-16 PROCEDURE — 1160F RVW MEDS BY RX/DR IN RCRD: CPT | Mod: CPTII,S$GLB,, | Performed by: STUDENT IN AN ORGANIZED HEALTH CARE EDUCATION/TRAINING PROGRAM

## 2024-07-16 NOTE — PROGRESS NOTES
Patient Information  Name: Ian Cifuentes  : 1965  MRN: [id number]     Referring Physician:  Dr. Coats   Primary Care Physician:  Elias Ivey MD   Date of Visit: 2024      Subjective:       Ian Cifuentes is a 59 y.o. male who presents for   Chief Complaint   Patient presents with    Itching     On arm. X 1 year. Tx none.      HPI  Patient is here with concern of: itching  Location: right forearm  Duration: 1 year  Symptoms: itching  Prior treatments: topical hydrocortisone    Patient was last seen:Visit date not found     Prior notes by myself reviewed.   Clinical documentation obtained by nursing staff reviewed.    Review of Systems   Skin:  Positive for itching. Negative for rash.        Objective:    Physical Exam   Constitutional: He appears well-developed and well-nourished. No distress.   Neurological: He is alert and oriented to person, place, and time. He is not disoriented.   Psychiatric: He has a normal mood and affect.   Skin:   Areas Examined (abnormalities noted in diagram):   RUE Inspected  LUE Inspection Performed              Diagram Legend     Erythematous scaling macule/papule c/w actinic keratosis       Vascular papule c/w angioma      Pigmented verrucoid papule/plaque c/w seborrheic keratosis      Yellow umbilicated papule c/w sebaceous hyperplasia      Irregularly shaped tan macule c/w lentigo     1-2 mm smooth white papules consistent with Milia      Movable subcutaneous cyst with punctum c/w epidermal inclusion cyst      Subcutaneous movable cyst c/w pilar cyst      Firm pink to brown papule c/w dermatofibroma      Pedunculated fleshy papule(s) c/w skin tag(s)      Evenly pigmented macule c/w junctional nevus     Mildly variegated pigmented, slightly irregular-bordered macule c/w mildly atypical nevus      Flesh colored to evenly pigmented papule c/w intradermal nevus       Pink pearly papule/plaque c/w basal cell carcinoma      Erythematous hyperkeratotic  cursted plaque c/w SCC      Surgical scar with no sign of skin cancer recurrence      Open and closed comedones      Inflammatory papules and pustules      Verrucoid papule consistent consistent with wart     Erythematous eczematous patches and plaques     Dystrophic onycholytic nail with subungual debris c/w onychomycosis     Umbilicated papule    Erythematous-base heme-crusted tan verrucoid plaque consistent with inflamed seborrheic keratosis     Erythematous Silvery Scaling Plaque c/w Psoriasis     See annotation      No images are attached to the encounter or orders placed in the encounter.    [] Data reviewed  [] Independent review of test  [] Management discussed with another provider    Assessment / Plan:        Brachioradial pruritus  - Discussed likely secondary to cervical nerve injury  - Recommend Dermeleve lotion           LOS NUMBER AND COMPLEXITY OF PROBLEMS    COMPLEXITY OF DATA RISK TOTAL TIME (m)   05423  03454 [] 1 self-limited or minor problem [x] Minimal to none [] No treatment recommended or patient to monitor 15-29  10-19   53082  12160 Low  [] 2 or > self limited or minor problems  [] 1 stable chronic illness  [] 1 acute, uncomplicated illness or injury Limited (2)  [] Prior external notes from each unique source  [] Review result of each unique test  [] Order each unique test [x]  Low  OTC medications, minor skin biopsy 30-44  20-29   09373  25483 Moderate  [x]  1 or > chronic illness with progression, exacerbation or SE of treatment  []  2 or more stable chronic illnesses  []  1 acute illness with systemic symptoms  []  1 acute complicated injury  []  1 undiagnosed new problem with uncertain prognosis Moderate (1/3 below)  []  3 or more data items        *Now includes assessment requiring independent historian  []  Independent interpretation of a test  []  Discuss management/test with another provider Moderate  []  Prescription drug mgmt  []  Minor surgery with risk discussed  []  Mgmt  limited by social determinates 45-59  30-39   31811  11723 High  []  1 or more chronic illness with severe exacerbation, progression or SE of treatment  []  1 acute or chronic illness/injury that poses a threat to life or bodily function Extensive (2/3 below)  []  3 or more data items        *Now includes assessment requiring independent historian.  []  Independent interpretation of a test  []  Discuss management/test with another provider High  []  Major surgery with risk discussed  []  Drug therapy requiring intensive monitoring for toxicity  []  Hospitalization  []  Decision for DNR 60-74  40-54      No follow-ups on file.    Stacie Nelson MD, FAAD  [facility]

## 2024-07-19 ENCOUNTER — PATIENT MESSAGE (OUTPATIENT)
Dept: PRIMARY CARE CLINIC | Facility: CLINIC | Age: 59
End: 2024-07-19
Payer: COMMERCIAL

## 2024-07-22 ENCOUNTER — OFFICE VISIT (OUTPATIENT)
Dept: PAIN MEDICINE | Facility: CLINIC | Age: 59
End: 2024-07-22
Payer: COMMERCIAL

## 2024-07-22 VITALS
HEART RATE: 69 BPM | DIASTOLIC BLOOD PRESSURE: 81 MMHG | HEIGHT: 73 IN | BODY MASS INDEX: 33.63 KG/M2 | SYSTOLIC BLOOD PRESSURE: 148 MMHG | WEIGHT: 253.75 LBS

## 2024-07-22 DIAGNOSIS — M54.12 CERVICAL RADICULOPATHY: Primary | ICD-10-CM

## 2024-07-22 DIAGNOSIS — M54.16 LUMBAR RADICULOPATHY: ICD-10-CM

## 2024-07-22 DIAGNOSIS — M25.50 ARTHRALGIA, UNSPECIFIED JOINT: ICD-10-CM

## 2024-07-22 DIAGNOSIS — M54.51 VERTEBROGENIC LOW BACK PAIN: ICD-10-CM

## 2024-07-22 DIAGNOSIS — M79.10 MYALGIA: ICD-10-CM

## 2024-07-22 PROCEDURE — 1159F MED LIST DOCD IN RCRD: CPT | Mod: CPTII,S$GLB,, | Performed by: ANESTHESIOLOGY

## 2024-07-22 PROCEDURE — 3079F DIAST BP 80-89 MM HG: CPT | Mod: CPTII,S$GLB,, | Performed by: ANESTHESIOLOGY

## 2024-07-22 PROCEDURE — 3077F SYST BP >= 140 MM HG: CPT | Mod: CPTII,S$GLB,, | Performed by: ANESTHESIOLOGY

## 2024-07-22 PROCEDURE — 3051F HG A1C>EQUAL 7.0%<8.0%: CPT | Mod: CPTII,S$GLB,, | Performed by: ANESTHESIOLOGY

## 2024-07-22 PROCEDURE — 99214 OFFICE O/P EST MOD 30 MIN: CPT | Mod: S$GLB,,, | Performed by: ANESTHESIOLOGY

## 2024-07-22 PROCEDURE — 99999 PR PBB SHADOW E&M-EST. PATIENT-LVL III: CPT | Mod: PBBFAC,,, | Performed by: ANESTHESIOLOGY

## 2024-07-22 PROCEDURE — 3008F BODY MASS INDEX DOCD: CPT | Mod: CPTII,S$GLB,, | Performed by: ANESTHESIOLOGY

## 2024-07-22 NOTE — PROGRESS NOTES
This note was completed with dictation software and grammatical errors may exist.    Chief Complaint   Patient presents with    Fibromyalgia     Arm and leg        HPI: Ian Cifuentes is a 59 y.o. year old male patient who has a past medical history of Acid reflux. He presents in referral from No ref. provider found for arm and leg pain.  The patient has previously been seen by 1 of my colleagues on the [place] but this is the 1st time I have seen him.  The patient states that he has had greater than 5 years of pain in his extremities, states that everything started on the left side.  He states that he had pain across the left chest and throughout the left arm with numbness and tingling pain and weakness.  Began getting issues in the left leg as well.  He now has bilateral arm pain, symptoms are similar and equally as bad on each side in the neck and into the bilateral arms.  He states that pain is throbbing, tingling, deep, sharp, electric, shooting.  He gets pain from the low back down posterior thigh to the left foot the pain is worse with standing, sitting too long, walking too long, flexing and lifting.  He states that exercise seems to make his pain worse.  Gets cramps in the hands at times even when he is doing simple activities.  He has been seen by Rheumatology and Neurology for multiple pain complaints.  He underwent right C2/3 medial branch block by outside physician on 01/10/2024.  It sounds like he no longer has upper right neck pain, headaches.  States that the injections in both his neck and in his back helped greater than 60% lasting several months each time.    Pain intervention history:  09/2023 - Left C6 SARAH w/ Dr. Moe  09/2023 - Left S1 TFPANCHITO w/Dr. Moe  He underwent right C2/3 medial branch block by outside physician on 01/10/2024.    Spine surgeries:    Antineuropathics:  He has tried Lyrica, gabapentin but both caused side effects  NSAIDs:  Physical  "therapy:  Antidepressants:  Wellbutrin 150, (Cymbalta 30 caused side effects so he did not continue)  Muscle relaxers:  Opioids:  Antiplatelets/Anticoagulants:  On Ozempic      ROS:  He reports joint stiffness, joint pain, aching, fatigue.  Balance of review of systems is negative.    Lab Results   Component Value Date    HGBA1C 7.0 (H) 06/05/2024       Lab Results   Component Value Date    WBC 5.57 06/08/2023    HGB 16.1 06/08/2023    HCT 47.0 06/08/2023    MCV 93 06/08/2023     06/08/2023             Past Medical History:   Diagnosis Date    Acid reflux        Past Surgical History:   Procedure Laterality Date    TONSILLECTOMY         Social History     Socioeconomic History    Marital status:    Tobacco Use    Smoking status: Never    Smokeless tobacco: Never   Substance and Sexual Activity    Alcohol use: Yes     Alcohol/week: 3.0 standard drinks of alcohol     Types: 3 Cans of beer per week     Comment: <1    Drug use: No    Sexual activity: Not Currently     Partners: Female         Medications/Allergies: See med card    Vitals:    07/22/24 1004   BP: (!) 148/81   Pulse: 69   Weight: 115.1 kg (253 lb 12 oz)   Height: 6' 1" (1.854 m)   PainSc:   2   PainLoc: Leg     Body mass index is 33.48 kg/m².      7/22/2024    10:03 AM 5/30/2024     2:13 PM 12/14/2023     8:18 AM   Last 3 PDI Scores   Pain Disability Index (PDI) 30 14 21     Physical exam:  Gen: A and O x3, pleasant, well-groomed  Skin: No rashes or obvious lesions  Musculoskeletal: No antalgic gait.     Neuro:  Motor:    Right Left   C4 Shoulder Abduction  5  5   C5 Elbow Flexion    5  5   C6 Wrist Extension  5  5   C7 Elbow Extension   5  5   C8/T1 Hand Intrinsics   5  5   C8 First Dorsal Interosseus  5  5   C8 Abductor Pollicus Brevis  5  5       Iliopsoas Quadriceps Knee  Flexion Tibialis  anterior Gastro- cnemius EHL   Lower: R 5/5 5/5 5/5 5/5 5/5 5/5    L 5/5 5/5 5/5 5/5 5/5 5/5        Left  Right    Triceps DTR 2+ 2+   Biceps DTR 2+ 2+ "   Brachioradialis DTR 2+ 2+   Patellar DTR 2+ 2+   Achilles DTR 2+ 2+   Childress Absent  Absent   Clonus Absent Absent   Babinski Absent Absent     Sensory: Intact and symmetrical to light touch and pinprick in C2-T1 dermatomes bilaterally. Intact and symmetrical to light touch and pinprick in L1-S1 dermatomes bilaterally.    Cervical spine: ROM is mildly reduced in flexion, extension and lateral rotation without increased pain.  Spurling's maneuver causes no neck pain to either side.  Myofascial exam: No Tenderness to palpation across cervical paraspinous region bilaterally.        Imaging:  MRI cervical spine 11/07/2023: There is central disc bulging at C3/4 with indentation of the thecal sac, slightly right greater than left foraminal narrowing.  Facet joint on the right side at C3/4 is hypertrophied.  Broad-based disc bulging at C5/6 with thinning of the thecal sac and some flattening of the cord but no signal change.  There is significant foraminal narrowing off to the left side, right side is normal.  Mild disc bulging at C6/7 with slight foraminal narrowing off to the right side, left side is normal.    11/07/2023:  MRI lumbar spine most notable for severe disc degeneration at L5/S1 with bilateral foraminal narrowing.  There is slight retrolisthesis of L5 on S1.  There are Modic type 2 endplate changes in L5 and S1.    Assessment:   Ian Cifuentes is a 59 y.o. year old male patient who has a past medical history of Acid reflux. He presents in referral from No ref. provider found for arm and leg pain.    1. Cervical radiculopathy        2. Lumbar radiculopathy        3. Vertebrogenic low back pain        4. Arthralgia, unspecified joint        5. Myalgia            Plan:  1.  We reviewed his symptoms, reviewed his imaging and physical exam.  He has pain into his bilateral arms, while he does have some foraminal narrowing and disc degeneration at C5/6, I am just not sure that there is enough to actually  cause the symptoms he describes with muscle pain throughout bilateral arms, non dermatomal.  Nonetheless, he has had relief with injections in the past but not long lasting.  He is going to be getting an EMG of his upper extremities and contact me when this is completed.  If there does seem to be any cervical involvement I would again try cervical epidural injection.  Otherwise I do not think eases surgical candidate.    2.  For his low back and leg, if this is primarily just low back pain, he does have facet arthropathy at L5/S1 but also Modic endplate changes and could have vertebrogenic low back pain.  3.  We discussed there are no medications that would seem to be the best option for him if his pain is not truly spine related and is more fibromyalgia.  We discussed that the most important thing is continuing strengthening, exercise, sleep hygiene.      Thank you for referring this interesting patient, and I look forward to continuing to collaborate in his care.

## 2024-07-23 ENCOUNTER — OFFICE VISIT (OUTPATIENT)
Dept: PRIMARY CARE CLINIC | Facility: CLINIC | Age: 59
End: 2024-07-23
Payer: COMMERCIAL

## 2024-07-23 DIAGNOSIS — E11.649 TYPE 2 DIABETES MELLITUS WITH HYPOGLYCEMIA WITHOUT COMA, WITHOUT LONG-TERM CURRENT USE OF INSULIN: Primary | ICD-10-CM

## 2024-07-23 PROCEDURE — 1160F RVW MEDS BY RX/DR IN RCRD: CPT | Mod: CPTII,95,, | Performed by: INTERNAL MEDICINE

## 2024-07-23 PROCEDURE — 99214 OFFICE O/P EST MOD 30 MIN: CPT | Mod: 95,,, | Performed by: INTERNAL MEDICINE

## 2024-07-23 PROCEDURE — 1159F MED LIST DOCD IN RCRD: CPT | Mod: CPTII,95,, | Performed by: INTERNAL MEDICINE

## 2024-07-23 PROCEDURE — 3051F HG A1C>EQUAL 7.0%<8.0%: CPT | Mod: CPTII,95,, | Performed by: INTERNAL MEDICINE

## 2024-07-23 RX ORDER — FLASH GLUCOSE SCANNING READER
1 EACH MISCELLANEOUS DAILY
Qty: 1 EACH | Refills: 0 | Status: SHIPPED | OUTPATIENT
Start: 2024-07-23

## 2024-07-23 NOTE — PROGRESS NOTES
[facility]    Chief Complaint    No chief complaint on file.      History of Present Illness      Ian Cifuentes is a 59 y.o. male who presents today for No chief complaint on file.  .  Patient comes to appointment here for tele visit related to issues with low blood sugar readings . His A1c last month was great at 7. He states he has been having multiple hypoglycemic events . He is on ozempijc and has been losing weight and has been great with diet . He has reduced his metformin xr from 1000 to 500 mg on his own 3 days ago .    Diabetes  He has type 2 diabetes mellitus. No MedicAlert identification noted. The initial diagnosis of diabetes was made 5 years ago. Hypoglycemia symptoms include dizziness, headaches, hunger, sleepiness, sweats and tremors. Pertinent negatives for hypoglycemia include no confusion, mood changes, nervousness/anxiousness, pallor, seizures or speech difficulty. Associated symptoms include fatigue, polyphagia and weakness. Pertinent negatives for diabetes include no blurred vision, no chest pain, no foot paresthesias, no foot ulcerations, no polydipsia, no polyuria, no visual change and no weight loss. Pertinent negatives for hypoglycemia complications include no blackouts, no hospitalization, no nocturnal hypoglycemia, no required assistance and no required glucagon injection. Symptoms are improving. Diabetic complications include impotence. Pertinent negatives for diabetic complications include no autonomic neuropathy, CVA, heart disease, nephropathy, peripheral neuropathy, PVD or retinopathy. His weight is stable. He is following a diabetic diet. When asked about meal planning, he reported none. He has not had a previous visit with a dietitian. He never participates in exercise. He monitors blood glucose at home 1-2 x per week. He monitors urine at home <1 x per month. There is no compliance with monitoring of blood glucose. There is no change in his  home blood glucose trend. He does not see a podiatrist.Eye exam is current.       No problem-specific Assessment & Plan notes found for this encounter.       Problem List Items Addressed This Visit          Endocrine    Type 2 diabetes mellitus with hypoglycemia without coma, without long-term current use of insulin - Primary    Overview      a1c  at 7.0 ,he is compliant with meds .has been having hypoglycemic episodes . Will reduce to metfromin xr 500 mg from 1000 mg . Cont ozempic and ordered MATIvisione cgm              Past Medical History:  Past Medical History:   Diagnosis Date    Acid reflux        Past Surgical History:  Past Surgical History:   Procedure Laterality Date    TONSILLECTOMY         Family History:  family history includes Diabetes in his brother, father, mother, and sister.     Social History:  Social History     Socioeconomic History    Marital status:    Tobacco Use    Smoking status: Never    Smokeless tobacco: Never   Substance and Sexual Activity    Alcohol use: Yes     Alcohol/week: 3.0 standard drinks of alcohol     Types: 3 Cans of beer per week     Comment: <1    Drug use: No    Sexual activity: Not Currently     Partners: Female       Review of Systems:   Review of Systems   Constitutional:  Positive for fatigue. Negative for weight loss.   Eyes:  Negative for blurred vision.   Cardiovascular:  Negative for chest pain.   Genitourinary:  Positive for impotence.   Skin:  Negative for pallor.   Neurological:  Positive for dizziness, tremors, weakness and headaches. Negative for seizures and speech difficulty.   Endo/Heme/Allergies:  Positive for polyphagia. Negative for polydipsia.   Psychiatric/Behavioral:  Negative for confusion. The patient is not nervous/anxious.         Medications:  Outpatient Encounter Medications as of 7/23/2024   Medication Sig Dispense Refill    buPROPion (WELLBUTRIN XL) 150 MG TB24 tablet TAKE ONE TABLET BY MOUTH EVERY DAY 90 tablet 3    flash  glucose scanning reader (FREESTYLE TATUM 2 READER) Misc 1 each by Misc.(Non-Drug; Combo Route) route once daily. 1 each 0    metFORMIN (GLUCOPHAGE-XR) 500 MG ER 24hr tablet Take 1 tablet (500 mg total) by mouth 2 (two) times daily with meals. 180 tablet 3    pantoprazole (PROTONIX) 40 MG tablet Take 1 tablet (40 mg total) by mouth once daily. 90 tablet 3    rosuvastatin (CRESTOR) 20 MG tablet TAKE ONE TABLET BY MOUTH EVERY DAY 90 tablet 3    semaglutide (OZEMPIC) 0.25 mg or 0.5 mg (2 mg/3 mL) pen injector Inject 0.5 mg into the skin every 7 days. 3 each 3    traZODone (DESYREL) 100 MG tablet Take 1 tablet (100 mg total) by mouth every evening. 30 tablet 3    [DISCONTINUED] ALPRAZolam (XANAX) 0.5 MG tablet Take one tablet by mouth 1 hour before procedure and bring other tablet with you to the procedure. 2 tablet 0    [DISCONTINUED] dextroamphetamine-amphetamine (ADDERALL XR) 20 MG 24 hr capsule Take 1 capsule (20 mg total) by mouth every morning. 30 capsule 0    [DISCONTINUED] DULoxetine (CYMBALTA) 30 MG capsule Take 1 capsule (30 mg total) by mouth once daily. 30 capsule 0     Facility-Administered Encounter Medications as of 7/23/2024   Medication Dose Route Frequency Provider Last Rate Last Admin    LIDOcaine HCL 10 mg/ml (1%) injection 1 mL  1 mL Other 1 time in Clinic/HOD Aryan Atkinson MD        LIDOcaine-EPINEPHrine 1%-1:100,000 30 mL, LIDOcaine HCL 10 mg/ml (1%) 20 mL, sodium bicarbonate 10 mL in sodium chloride 0.9% 500 mL solution   MISCELLANEOUS 1 time in Clinic/HOD Aryan Atkinson MD           Allergies:  Review of patient's allergies indicates:  No Known Allergies      Physical Exam      There were no vitals filed for this visit.      ]     There is no height or weight on file to calculate BMI.    Physical Exam  Constitutional:       General: He is not in acute distress.     Appearance: Normal appearance. He is not ill-appearing.   Eyes:      General:         Right eye: No discharge.          "Left eye: No discharge.      Extraocular Movements: Extraocular movements intact.      Pupils: Pupils are equal, round, and reactive to light.   Pulmonary:      Effort: Pulmonary effort is normal.   Neurological:      General: No focal deficit present.      Mental Status: He is alert and oriented to person, place, and time.   Psychiatric:         Mood and Affect: Mood normal.         Behavior: Behavior normal.          Laboratory:  CBC:  No results for input(s): "WBC", "RBC", "HGB", "HCT", "PLT", "MCV", "MCH", "MCHC" in the last 2160 hours.  CMP:  No results for input(s): "GLU", "CALCIUM", "ALBUMIN", "PROT", "NA", "K", "CO2", "CL", "BUN", "ALKPHOS", "ALT", "AST", "BILITOT" in the last 2160 hours.    Invalid input(s): "CREATININ"  URINALYSIS:  No results for input(s): "COLORU", "CLARITYU", "SPECGRAV", "PHUR", "PROTEINUA", "GLUCOSEU", "BILIRUBINCON", "BLOODU", "WBCU", "RBCU", "BACTERIA", "MUCUS", "NITRITE", "LEUKOCYTESUR", "UROBILINOGEN", "HYALINECASTS" in the last 2160 hours.   LIPIDS:  No results for input(s): "TSH", "HDL", "CHOL", "TRIG", "LDLCALC", "CHOLHDL", "NONHDLCHOL", "TOTALCHOLEST" in the last 2160 hours.  TSH:  No results for input(s): "TSH" in the last 2160 hours.  A1C:  Recent Labs   Lab Result Units 06/05/24  0954   Hemoglobin A1C % 7.0*       Radiology:        Assessment:     Ian Cifuentes is a 59 y.o.male with:    Type 2 diabetes mellitus with hypoglycemia without coma, without long-term current use of insulin  -     flash glucose scanning reader (FREESTYLE TATUM 2 READER) Misc; 1 each by Misc.(Non-Drug; Combo Route) route once daily.  Dispense: 1 each; Refill: 0                Plan:     Problem List Items Addressed This Visit          Endocrine    Type 2 diabetes mellitus with hypoglycemia without coma, without long-term current use of insulin - Primary    Overview      a1c  at 7.0 ,he is compliant with meds .has been having hypoglycemic episodes . Will reduce to metfromin xr 500 mg from 1000 mg " . Cont ozempic and ordered chris schilling cgm             As above, continue current medications and maintain follow up with specialists.  Return to clinic in 3 months.    '  Frederick W Dantagnan [facility] [place]

## 2024-08-12 DIAGNOSIS — G47.09 OTHER INSOMNIA: ICD-10-CM

## 2024-08-12 RX ORDER — TRAZODONE HYDROCHLORIDE 100 MG/1
100 TABLET ORAL NIGHTLY
Qty: 90 TABLET | Refills: 3 | Status: SHIPPED | OUTPATIENT
Start: 2024-08-12

## 2024-08-12 NOTE — TELEPHONE ENCOUNTER
Refill Decision Note   Ian Vane  is requesting a refill authorization.  Brief Assessment and Rationale for Refill:  Approve     Medication Therapy Plan:         Comments:     Note composed:5:31 PM 08/12/2024

## 2024-08-16 ENCOUNTER — OFFICE VISIT (OUTPATIENT)
Dept: PHYSICAL MEDICINE AND REHAB | Facility: CLINIC | Age: 59
End: 2024-08-16
Payer: COMMERCIAL

## 2024-08-16 DIAGNOSIS — M79.604 BILATERAL LEG AND FOOT PAIN: ICD-10-CM

## 2024-08-16 DIAGNOSIS — M54.42 CHRONIC BILATERAL LOW BACK PAIN WITH BILATERAL SCIATICA: ICD-10-CM

## 2024-08-16 DIAGNOSIS — M54.41 CHRONIC BILATERAL LOW BACK PAIN WITH BILATERAL SCIATICA: ICD-10-CM

## 2024-08-16 DIAGNOSIS — M79.605 BILATERAL LEG AND FOOT PAIN: ICD-10-CM

## 2024-08-16 DIAGNOSIS — G89.29 CHRONIC BILATERAL LOW BACK PAIN WITH BILATERAL SCIATICA: ICD-10-CM

## 2024-08-16 DIAGNOSIS — M79.671 BILATERAL LEG AND FOOT PAIN: ICD-10-CM

## 2024-08-16 DIAGNOSIS — M79.672 BILATERAL LEG AND FOOT PAIN: ICD-10-CM

## 2024-08-16 PROCEDURE — 99999 PR PBB SHADOW E&M-EST. PATIENT-LVL II: CPT | Mod: PBBFAC,,, | Performed by: PHYSICAL MEDICINE & REHABILITATION

## 2024-08-16 NOTE — PROGRESS NOTES
[facility]  [address] [facility] [address] [place] [address]             Full Name: Ian Cifuentes Gender: Male  MRN: [id number] YOB: 1965  History: Diabetic patient complaining of general muscle pain.  Low back pain and neck pain.       Visit Date: 8/16/2024 12:55 PM  Age: 59 Years  Examining Physician: Huma Martinez DO   Referring Physician: Elizabeth Cox MD  Height: 6 feet 1 inch      Sensory NCS      Nerve / Sites Rec. Site Onset Lat Peak Lat NP Amp PP Amp Segments Distance Velocity     ms ms µV µV  cm m/s   L Radial - Anatomical snuff box (Forearm)      Forearm Wrist 1.33 1.98 32.1 5.6 Forearm - Wrist 10 75   L Sural - Ankle (Calf)      Calf Ankle 2.77 3.90 13.2 6.7 Calf - Ankle 14 51   R Sural - Ankle (Calf)      Calf Ankle 3.38 4.17 9.0 2.9 Calf - Ankle 14 41   L Superficial peroneal - Ankle      Lat leg Ankle 2.75 3.77 17.4 7.6 Lat leg - Ankle 14 51   R Superficial peroneal - Ankle      Lat leg Ankle 2.71 3.83 8.7 3.9 Lat leg - Ankle 14 52       Motor NCS      Nerve / Sites Muscle Latency Amplitude Amp % Duration Segments Distance Lat Diff Velocity     ms mV % ms  cm ms m/s   L Ulnar - ADM      Wrist ADM 2.77 9.6 100 5.25 Wrist - ADM 8        B.Elbow ADM 6.52 7.6 79.4 5.79 B.Elbow - Wrist 23 3.75 61   L Peroneal - EDB      Ankle EDB 4.48 5.4 100 5.10 Ankle - EDB 8        Fib head EDB 12.44 3.4 62.9 6.31 Fib head - Ankle 33 7.96 41      Pop fossa EDB 15.00 3.4 63 5.85 Pop fossa - Fib head 10 2.56 39   R Peroneal - EDB      Ankle EDB 4.92 4.1 100 6.48 Ankle - EDB 8        Fib head EDB 13.23 3.3 80.5  Fib head - Ankle 34 8.31 41      Pop fossa EDB 15.71 2.9 69.8 7.50 Pop fossa - Fib head 10 2.48 40   L Tibial - AH      Ankle AH 3.75 9.1 100 6.71 Ankle - AH 8        Pop fossa AH 15.15 5.7 62  Pop fossa - Ankle 46.5 11.40 41   R Tibial - AH      Ankle AH 3.75 8.9 100 5.85 Ankle - AH 8        Pop fossa AH 14.90 7.5 84.4 8.46 Pop fossa - Ankle 44 11.15 39       EMG Summary Table     Spontaneous MUAP  Recruitment   Muscle IA Fib PSW Fasc CRD Amp Dur. Poly Pattern   L. Quadriceps N None None None None N N None N   L. Tibialis anterior N None None None None N N None N   L. Peroneus longus N None None None None N N None N   L. Gastrocnemius (Medial head) N None None None None N N None N   L. Gluteus medius N None None None None N N None N   R. Quadriceps N None None None None N N None N   R. Tibialis anterior N None None None None N N None N   R. Peroneus longus N None None None None N N None N   R. Gastrocnemius (Medial head) N None None None None N N None N   R. Gluteus medius N None None None None N N None N       Summary    The motor conduction test was normal in all 5 of the tested nerves: L Ulnar - ADM, L Peroneal - EDB, R Peroneal - EDB, L Tibial - AH, R Tibial - AH.    The sensory conduction test was normal in all 5 of the tested nerves: L Radial - Anatomical snuff box (Forearm), L Sural - Ankle (Calf), R Sural - Ankle (Calf), L Superficial peroneal - Ankle, R Superficial peroneal - Ankle.    The needle EMG study was normal in all 10 tested muscles: L. Quadriceps, L. Tibialis anterior, L. Peroneus longus, L. Gastrocnemius (Medial head), L. Gluteus medius, R. Quadriceps, R. Tibialis anterior, R. Peroneus longus, R. Gastrocnemius (Medial head), R. Gluteus medius.       Impression:  Normal study.  No electrophysiologic evidence of bilateral lumbosacral radiculopathy/plexopathy, bilateral tibial mononeuropathy, bilateral peroneal mononeuropathy, or peripheral neuropathy in the left upper or bilateral lower extremities.    ------------------------------  Huma Martinez, DO

## 2024-09-17 ENCOUNTER — LAB VISIT (OUTPATIENT)
Dept: LAB | Facility: HOSPITAL | Age: 59
End: 2024-09-17
Attending: STUDENT IN AN ORGANIZED HEALTH CARE EDUCATION/TRAINING PROGRAM
Payer: COMMERCIAL

## 2024-09-17 ENCOUNTER — OFFICE VISIT (OUTPATIENT)
Dept: NEUROLOGY | Facility: CLINIC | Age: 59
End: 2024-09-17
Payer: COMMERCIAL

## 2024-09-17 VITALS
WEIGHT: 254.19 LBS | DIASTOLIC BLOOD PRESSURE: 93 MMHG | BODY MASS INDEX: 33.69 KG/M2 | TEMPERATURE: 98 F | RESPIRATION RATE: 20 BRPM | SYSTOLIC BLOOD PRESSURE: 157 MMHG | HEART RATE: 64 BPM | HEIGHT: 73 IN | OXYGEN SATURATION: 97 %

## 2024-09-17 DIAGNOSIS — M62.81 MUSCLE WEAKNESS: ICD-10-CM

## 2024-09-17 DIAGNOSIS — M79.10 MUSCLE PAIN: ICD-10-CM

## 2024-09-17 DIAGNOSIS — M79.10 MUSCLE PAIN: Primary | ICD-10-CM

## 2024-09-17 LAB — CK SERPL-CCNC: 167 U/L (ref 20–200)

## 2024-09-17 PROCEDURE — 86596 VOLTAGE-GTD CA CHNL ANTB EA: CPT | Performed by: STUDENT IN AN ORGANIZED HEALTH CARE EDUCATION/TRAINING PROGRAM

## 2024-09-17 PROCEDURE — 1159F MED LIST DOCD IN RCRD: CPT | Mod: CPTII,S$GLB,, | Performed by: STUDENT IN AN ORGANIZED HEALTH CARE EDUCATION/TRAINING PROGRAM

## 2024-09-17 PROCEDURE — 3080F DIAST BP >= 90 MM HG: CPT | Mod: CPTII,S$GLB,, | Performed by: STUDENT IN AN ORGANIZED HEALTH CARE EDUCATION/TRAINING PROGRAM

## 2024-09-17 PROCEDURE — 3051F HG A1C>EQUAL 7.0%<8.0%: CPT | Mod: CPTII,S$GLB,, | Performed by: STUDENT IN AN ORGANIZED HEALTH CARE EDUCATION/TRAINING PROGRAM

## 2024-09-17 PROCEDURE — 82550 ASSAY OF CK (CPK): CPT | Performed by: STUDENT IN AN ORGANIZED HEALTH CARE EDUCATION/TRAINING PROGRAM

## 2024-09-17 PROCEDURE — 3077F SYST BP >= 140 MM HG: CPT | Mod: CPTII,S$GLB,, | Performed by: STUDENT IN AN ORGANIZED HEALTH CARE EDUCATION/TRAINING PROGRAM

## 2024-09-17 PROCEDURE — 86041 ACETYLCHOLN RCPTR BNDNG ANTB: CPT | Performed by: STUDENT IN AN ORGANIZED HEALTH CARE EDUCATION/TRAINING PROGRAM

## 2024-09-17 PROCEDURE — 36415 COLL VENOUS BLD VENIPUNCTURE: CPT | Performed by: STUDENT IN AN ORGANIZED HEALTH CARE EDUCATION/TRAINING PROGRAM

## 2024-09-17 PROCEDURE — 3008F BODY MASS INDEX DOCD: CPT | Mod: CPTII,S$GLB,, | Performed by: STUDENT IN AN ORGANIZED HEALTH CARE EDUCATION/TRAINING PROGRAM

## 2024-09-17 PROCEDURE — 99999 PR PBB SHADOW E&M-EST. PATIENT-LVL IV: CPT | Mod: PBBFAC,,, | Performed by: STUDENT IN AN ORGANIZED HEALTH CARE EDUCATION/TRAINING PROGRAM

## 2024-09-17 PROCEDURE — 86366 MUSCLE-SPECIFIC KINASE ANTB: CPT | Performed by: STUDENT IN AN ORGANIZED HEALTH CARE EDUCATION/TRAINING PROGRAM

## 2024-09-17 PROCEDURE — 99215 OFFICE O/P EST HI 40 MIN: CPT | Mod: S$GLB,,, | Performed by: STUDENT IN AN ORGANIZED HEALTH CARE EDUCATION/TRAINING PROGRAM

## 2024-09-17 NOTE — PROGRESS NOTES
Chief Complaint and Duration     F/u muscle pain overall    History of Present Illness     Ian Cifuentes is a 59 y.o. male who was recently evaluated by prior neurologists in August of 2023.  Patient has a history of hyperlipidemia, diabetes, NEETA, depression, ADHD, GERD.  Endorses longstanding history of diffuse muscle pain and weakness.  Feels that it has been worse, triggered by physical activity.  For episodes of muscle pain and weakness when he is holding the newspaper, serum well, or when combing his hair or brushing his teeth.  Denies any muscle wasting or fasciculations.  Has had extensive workup thus far including normal CK and MyoMarker 3 panel.  EMG has not been completed.  Has also been evaluated by Rheumatology.    Main complaint is that he feels muscle aching.  Does have mild chronic lower back pain.  Most recent x-ray in June shows he has degenerative changes.  No significant stenosis was noted.    Interim:  9/17/24 - here for followup. Has had ncs/emg 8/2024 without myopathic units.  Has also had study 8/2023 which also did not show myopathic units.  Feels overall body muscle pain. No difficulty w swallowing or speech or secretions.  Has been going on for at least 7-8 years.   Has had workup w neurology and rheumatology.     Review of patient's allergies indicates:  No Known Allergies  Current Outpatient Medications   Medication Sig Dispense Refill    buPROPion (WELLBUTRIN XL) 150 MG TB24 tablet TAKE ONE TABLET BY MOUTH EVERY DAY 90 tablet 3    metFORMIN (GLUCOPHAGE-XR) 500 MG ER 24hr tablet Take 1 tablet (500 mg total) by mouth 2 (two) times daily with meals. (Patient taking differently: Take 500 mg by mouth once daily.) 180 tablet 3    pantoprazole (PROTONIX) 40 MG tablet Take 1 tablet (40 mg total) by mouth once daily. 90 tablet 3    rosuvastatin (CRESTOR) 20 MG tablet TAKE ONE TABLET BY MOUTH EVERY DAY 90 tablet 3    semaglutide (OZEMPIC) 0.25 mg or 0.5 mg (2 mg/3 mL) pen injector Inject  0.5 mg into the skin every 7 days. 3 each 3    traZODone (DESYREL) 100 MG tablet Take 1 tablet (100 mg total) by mouth every evening. 90 tablet 3    flash glucose scanning reader (FREESTYLE TATUM 2 READER) Misc 1 each by Misc.(Non-Drug; Combo Route) route once daily. 1 each 0     Current Facility-Administered Medications   Medication Dose Route Frequency Provider Last Rate Last Admin    LIDOcaine HCL 10 mg/ml (1%) injection 1 mL  1 mL Other 1 time in Clinic/HOD Aryan Atkinson MD        LIDOcaine-EPINEPHrine 1%-1:100,000 30 mL, LIDOcaine HCL 10 mg/ml (1%) 20 mL, sodium bicarbonate 10 mL in sodium chloride 0.9% 500 mL solution   MISCELLANEOUS 1 time in Clinic/HOD Aryan Atkinson MD           Medical History     Past Medical History:   Diagnosis Date    Acid reflux      Past Surgical History:   Procedure Laterality Date    TONSILLECTOMY       Family History   Problem Relation Name Age of Onset    Diabetes Mother Theresa     Diabetes Father Jaycob     Diabetes Sister Ria     Diabetes Brother Andres      Social History     Socioeconomic History    Marital status:    Tobacco Use    Smoking status: Never    Smokeless tobacco: Never   Substance and Sexual Activity    Alcohol use: Yes     Alcohol/week: 3.0 standard drinks of alcohol     Types: 3 Cans of beer per week     Comment: <1    Drug use: No    Sexual activity: Not Currently     Partners: Female     Social Determinants of Health     Financial Resource Strain: Patient Declined (9/16/2024)    Overall Financial Resource Strain (CARDIA)     Difficulty of Paying Living Expenses: Patient declined   Food Insecurity: Patient Declined (9/16/2024)    Hunger Vital Sign     Worried About Running Out of Food in the Last Year: Patient declined     Ran Out of Food in the Last Year: Patient declined   Physical Activity: Insufficiently Active (9/16/2024)    Exercise Vital Sign     Days of Exercise per Week: 2 days     Minutes of Exercise per Session: 10 min    Stress: No Stress Concern Present (9/16/2024)    [employer] - Occupational Stress Questionnaire     Feeling of Stress : Not at all   Housing Stability: Unknown (9/16/2024)    Housing Stability Vital Sign     Unable to Pay for Housing in the Last Year: Patient declined       Exam     Vitals:    09/17/24 0703   BP: (!) 157/93   Pulse: 64   Resp: 20   Temp: 98.1 °F (36.7 °C)      Physical Exam:  General: Not in acute distress. Not ill-appearing.   HENT: Normocephalic and atraumatic. Moist mucous membranes.  Eyes: Conjunctivae normal.   Pulmonary: Pulmonary effort is normal.   Skin: Skin is warm and dry. No rashes.   Psychiatric: Mood normal.        Neurologic Exam   Mental status: oriented to person, place, and time  Attention: Normal. Concentration: normal.  Speech: speech is normal.  Cranial Nerves: EOMI intact, V1-V3 Facial sensation intact. Symmetric facies. Hearing grossly intact. Palate and uvula midline, symmetric. No tongue deviation. Trapezius strength intact.     Motor exam: bulk and tone normal. Strength 5/5 in bilateral upper extremities: deltoids, biceps, triceps, wrist flexion/extension, finger abduction/adduction. Strength 5/5 in bilateral lower extremities: hip flexion/extension, thigh adduction/abduction, knee flexion/extension, dorsiflexion/plantarflexion, foot eversion/inversion.    Reflexes: 2+ in bilateral upper extremities: biceps and brachiaradialis, 2+ in bilateral lower extremities: patellar  Plantar reflex: normal  Childress's/Clonus: negative    Sensory exam: light touch intact    Gait exam: normal  Romberg: negative  Coordination: normal    Tremor: none  Cogwheel rigidity: none    Labs and Imaging     Labs: reviewed  No results found for this or any previous visit (from the past 24 hour(s)).    A1c of 8.2 --> 7    Imaging:   I have personally reviewed the images performed.   X-ray of the lumbar spine in June of 2023 with no significant stenosis    Assessment and  Plan     Problem List Items Addressed This Visit          Orthopedic    Muscle pain - Primary    Relevant Orders    Ambulatory referral/consult to [facility]    Ambulatory referral/consult to Rheumatology    Myasthenia Gravis/Lambert-Eaton (Completed)    CK (Completed)    Muscle weakness     This is a patient followup for overall muscle pain.  Was seeing prior neurologist.    This is a 59-year-old male with fatigable type of weakness 2/2 to pain, states he feels his muscles aching and painful.  This is a longstanding history 7-8 years.  Does endorse numbness and tingling in his feet.  Does have diabetes.  Mild chronic lower back pain, denies radicular features.  Patient's strength 5/5 on exam today.  Has had 2 ncs/emg at this point that was unremarkable for myopathic process or myositis units.    States anything lifting arms above head, pain starts and patient feels weak.  Fatigable.     Patient is back on statin, states trial off for 6 months.    Discussed with the patient we would do this workup before proceeding to a muscle biopsy if needed, patient without evidence of a myopathy or weakness in his certain muscle on this exam today.    Discussed with the patient at this point, would benefit from being seen was Rheumatology again. Also functional restoration program may help.     Will also obtain myasthenia panel to rule out neuromuscular junction.     Follow-up:  Referral placed for Rheumatology    Time spent on this encounter:  40 minutes. This includes face to face time and non-face to face time preparing to see the patient (eg, review of tests), obtaining and/or reviewing separately obtained history, documenting clinical information in the electronic or other health record, independently interpreting results and communicating results to the patient/family/caregiver, or care coordinator.     This note was created by combination of typed  and M-Modal dictation. Transcription and  phonetic errors may be present.  If there are any questions, please contact me.

## 2024-09-20 ENCOUNTER — PATIENT MESSAGE (OUTPATIENT)
Dept: ADMINISTRATIVE | Facility: OTHER | Age: 59
End: 2024-09-20
Payer: COMMERCIAL

## 2024-09-21 LAB — MUSK ANTIBODY TEST: 0 NMOL/L (ref 0–0.02)

## 2024-09-23 LAB
ACHR BIND AB SER-SCNC: 0 NMOL/L
VGCC-N BIND AB SER-SCNC: NORMAL PMOL/L
VGCC-P/Q BIND AB SER-SCNC: 0 NMOL/L

## 2024-09-24 PROBLEM — M62.81 MUSCLE WEAKNESS: Status: ACTIVE | Noted: 2024-09-24

## 2024-09-24 PROBLEM — M79.10 MUSCLE PAIN: Status: ACTIVE | Noted: 2024-09-24

## 2024-10-03 ENCOUNTER — OFFICE VISIT (OUTPATIENT)
Dept: PAIN MEDICINE | Facility: OTHER | Age: 59
End: 2024-10-03
Payer: COMMERCIAL

## 2024-10-03 DIAGNOSIS — M79.10 MUSCLE PAIN: Primary | ICD-10-CM

## 2024-10-03 DIAGNOSIS — M54.12 CERVICAL RADICULOPATHY: ICD-10-CM

## 2024-10-03 DIAGNOSIS — M54.51 VERTEBROGENIC LOW BACK PAIN: ICD-10-CM

## 2024-10-03 NOTE — PROGRESS NOTES
[facility]    Initial Medical Screening Visit Note    Subjective:       Chief Complaint Requiring Rehabilitation: Chronic Pain    Consulted by: Erika Johnson MD      HPI:     Ian Cifuentes is a 59 y.o. male who presents today for the [facility] Medical Screening Visit. Ian Cifuentes was referred by Erika Johnson MD with associated diagnosis of chronic pain.     He reports a history of neck and low back pain for several years. He has tried conservative measures, including physical therapy and medications. He has also tried interventions with limited relief. He is interested in moving forward with treating the underlying cause, including decompression of his spine.             Past Medical History:   Diagnosis Date    Acid reflux        Past Surgical History:   Procedure Laterality Date    TONSILLECTOMY         Review of patient's allergies indicates:  No Known Allergies    Current Outpatient Medications   Medication Sig Dispense Refill    buPROPion (WELLBUTRIN XL) 150 MG TB24 tablet TAKE ONE TABLET BY MOUTH EVERY DAY 90 tablet 3    flash glucose scanning reader (FREESTYLE TATUM 2 READER) Misc 1 each by Misc.(Non-Drug; Combo Route) route once daily. 1 each 0    metFORMIN (GLUCOPHAGE-XR) 500 MG ER 24hr tablet Take 1 tablet (500 mg total) by mouth 2 (two) times daily with meals. (Patient taking differently: Take 500 mg by mouth once daily.) 180 tablet 3    pantoprazole (PROTONIX) 40 MG tablet Take 1 tablet (40 mg total) by mouth once daily. 90 tablet 3    rosuvastatin (CRESTOR) 20 MG tablet TAKE ONE TABLET BY MOUTH EVERY DAY 90 tablet 3    semaglutide (OZEMPIC) 0.25 mg or 0.5 mg (2 mg/3 mL) pen injector Inject 0.5 mg into the skin every 7 days. 3 each 3    traZODone (DESYREL) 100 MG tablet Take 1 tablet (100 mg total) by mouth every evening. 90 tablet 3     Current Facility-Administered Medications   Medication Dose Route Frequency Provider Last Rate Last Admin     LIDOcaine HCL 10 mg/ml (1%) injection 1 mL  1 mL Other 1 time in Clinic/HOD Aryan Atkinson MD        LIDOcaine-EPINEPHrine 1%-1:100,000 30 mL, LIDOcaine HCL 10 mg/ml (1%) 20 mL, sodium bicarbonate 10 mL in sodium chloride 0.9% 500 mL solution   MISCELLANEOUS 1 time in Clinic/HOD Aryan Atkinson MD           Family History   Problem Relation Name Age of Onset    Diabetes Mother Theresa     Diabetes Father Jaycob     Diabetes Sister Ria     Diabetes Brother Andres        Social History     Socioeconomic History    Marital status:    Tobacco Use    Smoking status: Never    Smokeless tobacco: Never   Substance and Sexual Activity    Alcohol use: Yes     Alcohol/week: 3.0 standard drinks of alcohol     Types: 3 Cans of beer per week     Comment: <1    Drug use: No    Sexual activity: Not Currently     Partners: Female     Social Drivers of Health     Financial Resource Strain: Patient Declined (9/16/2024)    Overall Financial Resource Strain (CARDIA)     Difficulty of Paying Living Expenses: Patient declined   Food Insecurity: Patient Declined (9/16/2024)    Hunger Vital Sign     Worried About Running Out of Food in the Last Year: Patient declined     Ran Out of Food in the Last Year: Patient declined   Physical Activity: Insufficiently Active (9/16/2024)    Exercise Vital Sign     Days of Exercise per Week: 2 days     Minutes of Exercise per Session: 10 min   Stress: No Stress Concern Present (9/16/2024)    [employer] - Occupational Stress Questionnaire     Feeling of Stress : Not at all   Housing Stability: Unknown (9/16/2024)    Housing Stability Vital Sign     Unable to Pay for Housing in the Last Year: Patient declined              Objective:        GEN: Well developed, well nourished. No acute distress. Fully alert, oriented, and appropriate. Speech normal milan.   PSYCH: Normal affect. Thought content appropriate.  CHEST: Breathing symmetric. No audible  wheezing.  SKIN: Warm, dry. No rash or discoloration on exposed areas.       Imaging:      Outside cervical and lumbar MRI in media.     Assessment:     Encounter Diagnoses   Name Primary?    Muscle pain Yes    Vertebrogenic low back pain     Cervical radiculopathy        Plan:     Diagnoses and all orders for this visit:    Muscle pain  -     Ambulatory referral/consult to [facility]    Vertebrogenic low back pain    Cervical radiculopathy         Ian Cifuentes is a 59 y.o. male with the above diagnoses.      Education about pain and the chronic pain cycle was provided today. Discussed the importance of multimodal and multidisciplinary management of chronic pain with a focus on both pain relief and function. Discussed how our team provides education and training aimed at improving physical function, emotional health, stress and pain coping skills.Treatment is designed to build confidence in physical activity and ADLs and in your ability to control and manage your pain.     He is not interested in further PT/OT at this time. He would like to try chiropractic care. He may contact us if he changes his mind regarding the program.     I spent a total of 15 minutes with the patient, and greater than 50% of the time was spent in counseling and education.     The above plan and management options were discussed at length with patient. Patient is in agreement with the above and verbalized understanding. It will be communicated with the referring physician via electronic record, fax, or mail.     Amie Walton NP  10/03/2024

## 2024-10-04 ENCOUNTER — PATIENT MESSAGE (OUTPATIENT)
Dept: PRIMARY CARE CLINIC | Facility: CLINIC | Age: 59
End: 2024-10-04
Payer: COMMERCIAL

## 2024-10-04 DIAGNOSIS — E11.9 DIABETES MELLITUS WITHOUT COMPLICATION: ICD-10-CM

## 2024-10-04 NOTE — TELEPHONE ENCOUNTER
Care Due:                  Date            Visit Type   Department     Provider  --------------------------------------------------------------------------------                                ESTABLISHED                              PATIENT -    [facility]  Last Visit: 07-      [facility]           Elias Garcia                              EP -                              PRIMARY      [facility]  Next Visit: 12-      CARE ([facility])   CARE           Elias Garcia                                                            Last  Test          Frequency    Reason                     Performed    Due Date  --------------------------------------------------------------------------------    HBA1C.......  6 months...  metFORMIN, semaglutide...  06- 12-    [facility] Embedded Care Due Messages. Reference number: [id number].   10/04/2024 12:05:34 PM CDT

## 2024-10-05 NOTE — TELEPHONE ENCOUNTER
Provider Staff:  Action required for this patient    Requires labs      Please see care gap opportunities below in Care Due Message.    Thanks!  [facility]     Appointments      Date Provider   Last Visit   7/23/2024 Elias Garcia MD   Next Visit   12/5/2024 Elias Garcia MD     Refill Decision Note   Ian Cifuentes  is requesting a refill authorization.  Brief Assessment and Rationale for Refill:  Approve     Medication Therapy Plan:         Comments:     Note composed:2:03 AM 10/05/2024

## 2024-10-17 DIAGNOSIS — K21.9 GASTROESOPHAGEAL REFLUX DISEASE WITHOUT ESOPHAGITIS: ICD-10-CM

## 2024-10-17 RX ORDER — PANTOPRAZOLE SODIUM 40 MG/1
40 TABLET, DELAYED RELEASE ORAL
Qty: 90 TABLET | Refills: 3 | Status: SHIPPED | OUTPATIENT
Start: 2024-10-17

## 2024-10-17 NOTE — TELEPHONE ENCOUNTER
No care due was identified.  [facility] Embedded Care Due Messages. Reference number: [id number].   10/17/2024 8:37:49 AM CDT

## 2024-11-04 ENCOUNTER — PATIENT MESSAGE (OUTPATIENT)
Dept: RESEARCH | Facility: HOSPITAL | Age: 59
End: 2024-11-04
Payer: COMMERCIAL

## 2024-11-07 ENCOUNTER — OFFICE VISIT (OUTPATIENT)
Dept: RHEUMATOLOGY | Facility: CLINIC | Age: 59
End: 2024-11-07
Payer: COMMERCIAL

## 2024-11-07 VITALS
HEART RATE: 61 BPM | SYSTOLIC BLOOD PRESSURE: 135 MMHG | DIASTOLIC BLOOD PRESSURE: 87 MMHG | WEIGHT: 255.94 LBS | BODY MASS INDEX: 33.77 KG/M2

## 2024-11-07 DIAGNOSIS — M79.10 MUSCLE PAIN: ICD-10-CM

## 2024-11-07 DIAGNOSIS — M79.7 FIBROMYALGIA: Primary | ICD-10-CM

## 2024-11-07 DIAGNOSIS — M15.0 PRIMARY OSTEOARTHRITIS INVOLVING MULTIPLE JOINTS: ICD-10-CM

## 2024-11-07 DIAGNOSIS — E11.649 TYPE 2 DIABETES MELLITUS WITH HYPOGLYCEMIA WITHOUT COMA, WITHOUT LONG-TERM CURRENT USE OF INSULIN: ICD-10-CM

## 2024-11-07 PROCEDURE — 99214 OFFICE O/P EST MOD 30 MIN: CPT | Mod: S$GLB,,, | Performed by: INTERNAL MEDICINE

## 2024-11-07 PROCEDURE — 3075F SYST BP GE 130 - 139MM HG: CPT | Mod: CPTII,S$GLB,, | Performed by: INTERNAL MEDICINE

## 2024-11-07 PROCEDURE — 1159F MED LIST DOCD IN RCRD: CPT | Mod: CPTII,S$GLB,, | Performed by: INTERNAL MEDICINE

## 2024-11-07 PROCEDURE — 3079F DIAST BP 80-89 MM HG: CPT | Mod: CPTII,S$GLB,, | Performed by: INTERNAL MEDICINE

## 2024-11-07 PROCEDURE — 99999 PR PBB SHADOW E&M-EST. PATIENT-LVL III: CPT | Mod: PBBFAC,,, | Performed by: INTERNAL MEDICINE

## 2024-11-07 PROCEDURE — 3008F BODY MASS INDEX DOCD: CPT | Mod: CPTII,S$GLB,, | Performed by: INTERNAL MEDICINE

## 2024-11-07 PROCEDURE — 3051F HG A1C>EQUAL 7.0%<8.0%: CPT | Mod: CPTII,S$GLB,, | Performed by: INTERNAL MEDICINE

## 2024-11-07 PROCEDURE — 1160F RVW MEDS BY RX/DR IN RCRD: CPT | Mod: CPTII,S$GLB,, | Performed by: INTERNAL MEDICINE

## 2024-11-10 NOTE — PROGRESS NOTES
History of present illness:  59-year-old male was evaluated by Dr. Paris 1 year ago.  He presented with polyarthralgia.  He had a positive BORA.  She found no evidence of an underlying connective tissue disease.  She did not have any therapeutic recommendations.  He comes in to see me for a 2nd opinion.    The aching has been present for 5 years.  It was of gradual onset.  It started initially in his chest but he had a negative cardiac workup.  The pain then spread all over.  The pain is bad in the morning.  It is worse with activity.  It does wake him up at night.  He has no morning stiffness.  He has no joint swelling.  He does have some weakness in his arms.  He has been diagnosed as having an ulnar neuropathy on the left side.    He had previously been treated with Flexeril, Cymbalta, gabapentin, and meloxicam.  He states these cause vertigo but he also has vertigo when he is not taking medication.  He has been going to a chiropractor, which helps.  Physical therapy did not help.  He has not tried heat, ice, or topical medications.    Physical examination:  Musculoskeletal:  Cervical spine is unremarkable.    He has some pain on range of motion of the right shoulder.  Left shoulder is unremarkable.  Elbows, wrists, small joints of the hands are unremarkable.  He has negative Tinel sign at the elbow and wrists.    He has pain on range of motion of the lumbar spine.  He has no tenderness to palpation.    Hips, knees, ankles, small joints the feet are unremarkable.  He is diffusely tender to palpation in both articular and nonarticular area.  Laboratory: He had a low titer positive BORA with a negative BORA profile.  All other immunologic studies are normal.  Other laboratory studies are normal.  Radiology: Prior x-rays have shown evidence of osteoarthritis     Assessment:  Clinically he only has evidence of fibromyalgia.  He has had side effects with multiple medications.    Plans:   1.  I placed him on Savella  starting with 12.5 mg at bedtime.  I told him the dose could be increased in the future.  2.  Return to see me in 4 months.

## 2024-12-05 ENCOUNTER — LAB VISIT (OUTPATIENT)
Dept: LAB | Facility: HOSPITAL | Age: 59
End: 2024-12-05
Attending: INTERNAL MEDICINE
Payer: COMMERCIAL

## 2024-12-05 ENCOUNTER — OFFICE VISIT (OUTPATIENT)
Dept: PRIMARY CARE CLINIC | Facility: CLINIC | Age: 59
End: 2024-12-05
Payer: COMMERCIAL

## 2024-12-05 VITALS
DIASTOLIC BLOOD PRESSURE: 80 MMHG | OXYGEN SATURATION: 97 % | RESPIRATION RATE: 18 BRPM | WEIGHT: 260.38 LBS | TEMPERATURE: 98 F | BODY MASS INDEX: 34.51 KG/M2 | HEART RATE: 63 BPM | HEIGHT: 73 IN | SYSTOLIC BLOOD PRESSURE: 124 MMHG

## 2024-12-05 DIAGNOSIS — G47.33 OSA (OBSTRUCTIVE SLEEP APNEA): ICD-10-CM

## 2024-12-05 DIAGNOSIS — E78.2 MIXED HYPERLIPIDEMIA: ICD-10-CM

## 2024-12-05 DIAGNOSIS — E11.649 TYPE 2 DIABETES MELLITUS WITH HYPOGLYCEMIA WITHOUT COMA, WITHOUT LONG-TERM CURRENT USE OF INSULIN: ICD-10-CM

## 2024-12-05 DIAGNOSIS — F33.0 DEPRESSION, MAJOR, RECURRENT, MILD: ICD-10-CM

## 2024-12-05 DIAGNOSIS — M79.7 FIBROMYALGIA: ICD-10-CM

## 2024-12-05 DIAGNOSIS — M15.0 PRIMARY OSTEOARTHRITIS INVOLVING MULTIPLE JOINTS: ICD-10-CM

## 2024-12-05 DIAGNOSIS — E11.649 TYPE 2 DIABETES MELLITUS WITH HYPOGLYCEMIA WITHOUT COMA, WITHOUT LONG-TERM CURRENT USE OF INSULIN: Primary | ICD-10-CM

## 2024-12-05 DIAGNOSIS — H93.13 TINNITUS OF BOTH EARS: ICD-10-CM

## 2024-12-05 LAB
ESTIMATED AVG GLUCOSE: 157 MG/DL (ref 68–131)
HBA1C MFR BLD: 7.1 % (ref 4–5.6)

## 2024-12-05 PROCEDURE — 99214 OFFICE O/P EST MOD 30 MIN: CPT | Mod: S$GLB,,, | Performed by: INTERNAL MEDICINE

## 2024-12-05 PROCEDURE — 1160F RVW MEDS BY RX/DR IN RCRD: CPT | Mod: CPTII,S$GLB,, | Performed by: INTERNAL MEDICINE

## 2024-12-05 PROCEDURE — 83036 HEMOGLOBIN GLYCOSYLATED A1C: CPT | Performed by: INTERNAL MEDICINE

## 2024-12-05 PROCEDURE — 3074F SYST BP LT 130 MM HG: CPT | Mod: CPTII,S$GLB,, | Performed by: INTERNAL MEDICINE

## 2024-12-05 PROCEDURE — 99999 PR PBB SHADOW E&M-EST. PATIENT-LVL IV: CPT | Mod: PBBFAC,,, | Performed by: INTERNAL MEDICINE

## 2024-12-05 PROCEDURE — 36415 COLL VENOUS BLD VENIPUNCTURE: CPT | Performed by: INTERNAL MEDICINE

## 2024-12-05 PROCEDURE — 1159F MED LIST DOCD IN RCRD: CPT | Mod: CPTII,S$GLB,, | Performed by: INTERNAL MEDICINE

## 2024-12-05 PROCEDURE — 3051F HG A1C>EQUAL 7.0%<8.0%: CPT | Mod: CPTII,S$GLB,, | Performed by: INTERNAL MEDICINE

## 2024-12-05 PROCEDURE — 3008F BODY MASS INDEX DOCD: CPT | Mod: CPTII,S$GLB,, | Performed by: INTERNAL MEDICINE

## 2024-12-05 PROCEDURE — 3079F DIAST BP 80-89 MM HG: CPT | Mod: CPTII,S$GLB,, | Performed by: INTERNAL MEDICINE

## 2024-12-05 RX ORDER — BUPROPION HYDROCHLORIDE 150 MG/1
150 TABLET ORAL DAILY
Qty: 90 TABLET | Refills: 3 | Status: SHIPPED | OUTPATIENT
Start: 2024-12-05

## 2024-12-05 NOTE — PROGRESS NOTES
[facility] Note    Chief Complaint      Chief Complaint   Patient presents with    Follow-up     6m       History of Present Illness      Ian Cifuentes is a 59 y.o. male who presents today for   Chief Complaint   Patient presents with    Follow-up     6m   .  Patient comes to appointment here for 6m checkup for chornic issues as below . He has been working [facility] functional medicine group in [place] for his chronic pain , fibromyalgia and cervical radiculopathy . He needs A1c with this visit .     HPI    No problem-specific Assessment & Plan notes found for this encounter.       Problem List Items Addressed This Visit       Type 2 diabetes mellitus with hypoglycemia without coma, without long-term current use of insulin - Primary    Overview     he is compliant with meds .has been having hypoglycemic episodes . Will reduce to metfromin xr 500 mg from 1000 mg . Cont ozempic and ordered freSyncloguetyle shakir cgm . Needs A1c          Mixed hyperlipidemia    Overview      crestor tolerating cont current          NEETA (obstructive sleep apnea)    Overview     Has had sinus surgery with dr kapoor resolved problem          Depression, major, recurrent, mild    Overview     Has been off welbutrin and lexapro . . He feels like he need sto be back on . He has been anxious recenty with increased tinnitis          Fibromyalgia    Overview     Rheumatology is managing          Primary osteoarthritis involving multiple joints    Overview     Stable nsaids          Tinnitus of both ears    Overview     Seeing ent in [place] currently , seems worsening              Past Medical History:  Past Medical History:   Diagnosis Date    Acid reflux        Past Surgical History:  Past Surgical History:   Procedure Laterality Date    TONSILLECTOMY         Family History:  family history includes Diabetes in his brother, father, mother, and sister.     Social History:  Social History     Socioeconomic History    Marital  status:    Tobacco Use    Smoking status: Never    Smokeless tobacco: Never   Substance and Sexual Activity    Alcohol use: Yes     Alcohol/week: 3.0 standard drinks of alcohol     Types: 3 Cans of beer per week     Comment: <1    Drug use: No    Sexual activity: Not Currently     Partners: Female     Social Drivers of Health     Financial Resource Strain: Patient Declined (9/16/2024)    Overall Financial Resource Strain (CARDIA)     Difficulty of Paying Living Expenses: Patient declined   Food Insecurity: Patient Declined (9/16/2024)    Hunger Vital Sign     Worried About Running Out of Food in the Last Year: Patient declined     Ran Out of Food in the Last Year: Patient declined   Physical Activity: Insufficiently Active (9/16/2024)    Exercise Vital Sign     Days of Exercise per Week: 2 days     Minutes of Exercise per Session: 10 min   Stress: No Stress Concern Present (9/16/2024)    [employer] - Occupational Stress Questionnaire     Feeling of Stress : Not at all   Housing Stability: Unknown (9/16/2024)    Housing Stability Vital Sign     Unable to Pay for Housing in the Last Year: Patient declined       Review of Systems:   Review of Systems   Constitutional:  Negative for fever and weight loss.   HENT:  Negative for congestion, hearing loss and sore throat.    Eyes:  Negative for blurred vision.   Respiratory:  Negative for cough and shortness of breath.    Cardiovascular:  Negative for chest pain, palpitations, claudication and leg swelling.   Gastrointestinal:  Negative for abdominal pain, constipation, diarrhea and heartburn.   Genitourinary:  Negative for dysuria.   Musculoskeletal:  Positive for myalgias. Negative for back pain.   Skin:  Negative for rash.   Neurological:  Negative for focal weakness and headaches.   Psychiatric/Behavioral:  Negative for depression and suicidal ideas. The patient is not nervous/anxious.         Medications:  Outpatient Encounter  "Medications as of 12/5/2024   Medication Sig Dispense Refill    metFORMIN (GLUCOPHAGE-XR) 500 MG ER 24hr tablet Take 1 tablet (500 mg total) by mouth 2 (two) times daily with meals. 180 tablet 3    milnacipran (SAVELLA) 12.5 mg Tab tablet Take 1 tablet (12.5 mg total) by mouth once daily. 30 tablet 4    pantoprazole (PROTONIX) 40 MG tablet TAKE 1 TABLET BY MOUTH EVERY DAY 90 tablet 3    rosuvastatin (CRESTOR) 20 MG tablet TAKE ONE TABLET BY MOUTH EVERY DAY 90 tablet 3    semaglutide (OZEMPIC) 0.25 mg or 0.5 mg (2 mg/3 mL) pen injector Inject 0.5 mg into the skin every 7 days. 9 mL 0    traZODone (DESYREL) 100 MG tablet Take 1 tablet (100 mg total) by mouth every evening. 90 tablet 3    [DISCONTINUED] buPROPion (WELLBUTRIN XL) 150 MG TB24 tablet TAKE ONE TABLET BY MOUTH EVERY DAY 90 tablet 3    buPROPion (WELLBUTRIN XL) 150 MG TB24 tablet Take 1 tablet (150 mg total) by mouth once daily. 90 tablet 3    [DISCONTINUED] flash glucose scanning reader (FREESTYLE TATUM 2 READER) Misc 1 each by Misc.(Non-Drug; Combo Route) route once daily. 1 each 0     Facility-Administered Encounter Medications as of 12/5/2024   Medication Dose Route Frequency Provider Last Rate Last Admin    LIDOcaine HCL 10 mg/ml (1%) injection 1 mL  1 mL Other 1 time in Clinic/HOD Aryan Atkinson MD        LIDOcaine-EPINEPHrine 1%-1:100,000 30 mL, LIDOcaine HCL 10 mg/ml (1%) 20 mL, sodium bicarbonate 10 mL in sodium chloride 0.9% 500 mL solution   MISCELLANEOUS 1 time in Clinic/HOD Aryan Atkinson MD           Allergies:  Review of patient's allergies indicates:  No Known Allergies      Physical Exam      Vitals:    12/05/24 1000   BP: 124/80   Pulse: 63   Resp: 18   Temp: 97.6 °F (36.4 °C)        Vital Signs  Temp: 97.6 °F (36.4 °C)  Temp Source: Oral  Pulse: 63  Resp: 18  SpO2: 97 %  BP: 124/80  BP Location: Right arm  Patient Position: Sitting  Pain Score: 0-No pain  Height and Weight  Height: 6' 1" (185.4 cm)  Weight: 118.1 kg (260 lb " "5.8 oz)  BSA (Calculated - sq m): 2.47 sq meters  BMI (Calculated): 34.4  Weight in (lb) to have BMI = 25: 189.1]     Body mass index is 34.35 kg/m².    Physical Exam  Constitutional:       Appearance: He is well-developed.   HENT:      Head: Normocephalic.   Eyes:      Pupils: Pupils are equal, round, and reactive to light.   Neck:      Thyroid: No thyromegaly.   Cardiovascular:      Rate and Rhythm: Normal rate and regular rhythm.      Heart sounds: No murmur heard.     No friction rub. No gallop.   Pulmonary:      Effort: Pulmonary effort is normal.      Breath sounds: Normal breath sounds.   Abdominal:      General: Bowel sounds are normal.      Palpations: Abdomen is soft.   Musculoskeletal:         General: Normal range of motion.      Cervical back: Normal range of motion.   Skin:     General: Skin is warm and dry.   Neurological:      Mental Status: He is alert and oriented to person, place, and time.      Sensory: No sensory deficit.   Psychiatric:         Behavior: Behavior normal.          Laboratory:  CBC:  No results for input(s): "WBC", "RBC", "HGB", "HCT", "PLT", "MCV", "MCH", "MCHC" in the last 2160 hours.  CMP:  No results for input(s): "GLU", "CALCIUM", "ALBUMIN", "PROT", "NA", "K", "CO2", "CL", "BUN", "ALKPHOS", "ALT", "AST", "BILITOT" in the last 2160 hours.    Invalid input(s): "CREATININ"  URINALYSIS:  No results for input(s): "COLORU", "CLARITYU", "SPECGRAV", "PHUR", "PROTEINUA", "GLUCOSEU", "BILIRUBINCON", "BLOODU", "WBCU", "RBCU", "BACTERIA", "MUCUS", "NITRITE", "LEUKOCYTESUR", "UROBILINOGEN", "HYALINECASTS" in the last 2160 hours.   LIPIDS:  No results for input(s): "TSH", "HDL", "CHOL", "TRIG", "LDLCALC", "CHOLHDL", "NONHDLCHOL", "TOTALCHOLEST" in the last 2160 hours.  TSH:  No results for input(s): "TSH" in the last 2160 hours.  A1C:  No results for input(s): "HGBA1C" in the last 2160 hours.    Radiology:        Assessment:     Ian Cifuentes is a 59 y.o.male with:    Type 2 diabetes " mellitus with hypoglycemia without coma, without long-term current use of insulin  -     Hemoglobin A1C; Future; Expected date: 12/05/2024    Primary osteoarthritis involving multiple joints    NEETA (obstructive sleep apnea)    Depression, major, recurrent, mild  -     buPROPion (WELLBUTRIN XL) 150 MG TB24 tablet; Take 1 tablet (150 mg total) by mouth once daily.  Dispense: 90 tablet; Refill: 3    Mixed hyperlipidemia    Tinnitus of both ears    Fibromyalgia                Plan:     Problem List Items Addressed This Visit       Type 2 diabetes mellitus with hypoglycemia without coma, without long-term current use of insulin - Primary    Overview     he is compliant with meds .has been having hypoglycemic episodes . Will reduce to metfromin xr 500 mg from 1000 mg . Cont ozempic and ordered Mobibeame cgm . Needs A1c          Mixed hyperlipidemia    Overview      crestor tolerating cont current          NEETA (obstructive sleep apnea)    Overview     Has had sinus surgery with dr kapoor resolved problem          Depression, major, recurrent, mild    Overview     Has been off welbutrin and lexapro . . He feels like he need sto be back on . He has been anxious recenty with increased tinnitis          Fibromyalgia    Overview     Rheumatology is managing          Primary osteoarthritis involving multiple joints    Overview     Stable nsaids          Tinnitus of both ears    Overview     Seeing ent in [place] currently , seems worsening             As above, continue current medications and maintain follow up with specialists.  Return to clinic in 6 months.      Frederick W Dantagnan [facility]

## 2025-01-01 ENCOUNTER — PATIENT MESSAGE (OUTPATIENT)
Dept: PRIMARY CARE CLINIC | Facility: CLINIC | Age: 60
End: 2025-01-01
Payer: COMMERCIAL

## 2025-01-08 ENCOUNTER — PATIENT MESSAGE (OUTPATIENT)
Dept: PRIMARY CARE CLINIC | Facility: CLINIC | Age: 60
End: 2025-01-08
Payer: COMMERCIAL

## 2025-01-08 NOTE — TELEPHONE ENCOUNTER
Pharmacy requesting refill on Lexapro 20 mg  Pt's LOV with Elias Garcia MD , 12/5/2024  Medication pending

## 2025-01-08 NOTE — TELEPHONE ENCOUNTER
Care Due:                  Date            Visit Type   Department     Provider  --------------------------------------------------------------------------------                                EP -                              PRIMARY      [facility] PR[facility]  Last Visit: 12-      CARE ([facility])   SHERIE Garcia                              EP -                              PRIMARY      [facility] PRIMARY  Next Visit: 06-      CARE ([facility])   SHERIE Garcia                                                            Last  Test          Frequency    Reason                     Performed    Due Date  --------------------------------------------------------------------------------    CMP.........  12 months..  metFORMIN, rosuvastatin..  03- 02-    Lipid Panel.  12 months..  rosuvastatin.............  03- 02-    [facility] Embedded Care Due Messages. Reference number: [id number].   1/08/2025 3:43:41 PM CST

## 2025-01-09 RX ORDER — ESCITALOPRAM OXALATE 20 MG/1
20 TABLET ORAL DAILY
Qty: 90 TABLET | Refills: 3 | Status: SHIPPED | OUTPATIENT
Start: 2025-01-09 | End: 2026-01-09

## 2025-01-11 DIAGNOSIS — E11.9 DIABETES MELLITUS WITHOUT COMPLICATION: ICD-10-CM

## 2025-01-11 RX ORDER — SEMAGLUTIDE 0.68 MG/ML
0.5 INJECTION, SOLUTION SUBCUTANEOUS WEEKLY
Qty: 9 ML | Refills: 1 | Status: SHIPPED | OUTPATIENT
Start: 2025-01-11

## 2025-01-11 NOTE — TELEPHONE ENCOUNTER
No care due was identified.  [facility] Embedded Care Due Messages. Reference number: [id number].   1/11/2025 11:20:55 AM CST

## 2025-01-12 NOTE — TELEPHONE ENCOUNTER
Refill Decision Note   Ian Vane  is requesting a refill authorization.  Brief Assessment and Rationale for Refill:  Approve     Medication Therapy Plan:         Comments:     Note composed:10:07 PM 01/11/2025

## 2025-03-07 DIAGNOSIS — E11.9 DIABETES MELLITUS WITHOUT COMPLICATION: ICD-10-CM

## 2025-03-07 NOTE — TELEPHONE ENCOUNTER
Refill Routing Note   Medication(s) are not appropriate for processing by [facility] for the following reason(s):        Required labs outdated    [facility] action(s):  Defer   Requires labs : Yes             Appointments  past 12m or future 3m with PCP    Date Provider   Last Visit   12/5/2024 Elias Garcia MD   Next Visit   6/5/2025 Elias Garcia MD   ED visits in past 90 days: 0        Note composed:10:00 AM 03/07/2025

## 2025-03-07 NOTE — TELEPHONE ENCOUNTER
Care Due:                  Date            Visit Type   Department     Provider  --------------------------------------------------------------------------------                                EP -                              PRIMARY      [facility] PRIMARY  Last Visit: 12-      CARE ([facility])   CARE           Elias Garcia                              EP -                              PRIMARY      [facility] PRIMARY  Next Visit: 06-      CARE ([facility])   SHERIE Garcia                                                            Last  Test          Frequency    Reason                     Performed    Due Date  --------------------------------------------------------------------------------    HBA1C.......  6 months...  OZEMPIC, metFORMIN.......  12- 06-    [facility] Embedded Care Due Messages. Reference number: [id number].   3/07/2025 7:58:26 AM CST

## 2025-03-10 RX ORDER — METFORMIN HYDROCHLORIDE 500 MG/1
500 TABLET, EXTENDED RELEASE ORAL 2 TIMES DAILY WITH MEALS
Qty: 180 TABLET | Refills: 0 | Status: SHIPPED | OUTPATIENT
Start: 2025-03-10

## 2025-03-12 DIAGNOSIS — E11.9 TYPE 2 DIABETES MELLITUS WITHOUT COMPLICATION: ICD-10-CM

## 2025-03-31 ENCOUNTER — PATIENT MESSAGE (OUTPATIENT)
Dept: PRIMARY CARE CLINIC | Facility: CLINIC | Age: 60
End: 2025-03-31
Payer: COMMERCIAL

## 2025-03-31 DIAGNOSIS — E78.2 MIXED HYPERLIPIDEMIA: ICD-10-CM

## 2025-03-31 DIAGNOSIS — E11.9 DIABETES MELLITUS WITHOUT COMPLICATION: ICD-10-CM

## 2025-03-31 NOTE — TELEPHONE ENCOUNTER
No care due was identified.  [facility] Embedded Care Due Messages. Reference number: [id number].   3/31/2025 5:15:38 PM CDT

## 2025-03-31 NOTE — TELEPHONE ENCOUNTER
Care Due:                  Date            Visit Type   Department     Provider  --------------------------------------------------------------------------------                                EP -                              PRIMARY      [facility] PR[facility]  Last Visit: 12-      CARE ([facility])   SHERIE Garcia                              EP -                              PRIMARY      [facility] PRIMARY  Next Visit: 06-      CARE ([facility])   SHERIE Garcia                                                            Last  Test          Frequency    Reason                     Performed    Due Date  --------------------------------------------------------------------------------    CMP.........  12 months..  metFORMIN, rosuvastatin..  03- 02-    Lipid Panel.  12 months..  rosuvastatin.............  03- 02-    [facility] [employer] Embedded Care Due Messages. Reference number: [id number].   3/31/2025 8:00:50 AM CDT

## 2025-03-31 NOTE — TELEPHONE ENCOUNTER
Refill Routing Note   Medication(s) are not appropriate for processing by [facility] for the following reason(s):        Required labs outdated    [facility] action(s):  Defer   Requires labs : Yes               Appointments  past 12m or future 3m with PCP    Date Provider   Last Visit   12/5/2024 Elias Garcia MD   Next Visit   6/5/2025 Elias Garcia MD   ED visits in past 90 days: 0        Note composed:5:26 PM 03/31/2025

## 2025-03-31 NOTE — TELEPHONE ENCOUNTER
LOV with Elias Garcia MD , 12/5/2024  [facility] request for ozempic not found in chart. Pt requesting refill. Order pended if appropriate.

## 2025-04-01 RX ORDER — ROSUVASTATIN CALCIUM 20 MG/1
20 TABLET, COATED ORAL
Qty: 90 TABLET | Refills: 0 | Status: SHIPPED | OUTPATIENT
Start: 2025-04-01

## 2025-04-01 RX ORDER — SEMAGLUTIDE 0.68 MG/ML
0.5 INJECTION, SOLUTION SUBCUTANEOUS WEEKLY
Qty: 9 ML | Refills: 1 | Status: SHIPPED | OUTPATIENT
Start: 2025-04-01

## 2025-05-01 DIAGNOSIS — E11.649 TYPE 2 DIABETES MELLITUS WITH HYPOGLYCEMIA WITHOUT COMA, WITHOUT LONG-TERM CURRENT USE OF INSULIN: ICD-10-CM

## 2025-06-04 DIAGNOSIS — E11.9 TYPE 2 DIABETES MELLITUS WITHOUT COMPLICATION: ICD-10-CM

## 2025-06-05 ENCOUNTER — OFFICE VISIT (OUTPATIENT)
Dept: PRIMARY CARE CLINIC | Facility: CLINIC | Age: 60
End: 2025-06-05
Payer: COMMERCIAL

## 2025-06-05 VITALS
DIASTOLIC BLOOD PRESSURE: 82 MMHG | RESPIRATION RATE: 18 BRPM | OXYGEN SATURATION: 97 % | SYSTOLIC BLOOD PRESSURE: 120 MMHG | HEIGHT: 73 IN | WEIGHT: 250 LBS | HEART RATE: 63 BPM | BODY MASS INDEX: 33.13 KG/M2

## 2025-06-05 DIAGNOSIS — E11.9 DIABETES MELLITUS WITHOUT COMPLICATION: ICD-10-CM

## 2025-06-05 DIAGNOSIS — Z12.5 PROSTATE CANCER SCREENING: ICD-10-CM

## 2025-06-05 DIAGNOSIS — E78.2 MIXED HYPERLIPIDEMIA: ICD-10-CM

## 2025-06-05 DIAGNOSIS — M25.50 POLYARTHRALGIA: ICD-10-CM

## 2025-06-05 DIAGNOSIS — F33.0 DEPRESSION, MAJOR, RECURRENT, MILD: ICD-10-CM

## 2025-06-05 DIAGNOSIS — Z00.00 ANNUAL PHYSICAL EXAM: Primary | ICD-10-CM

## 2025-06-05 DIAGNOSIS — G47.33 OSA (OBSTRUCTIVE SLEEP APNEA): ICD-10-CM

## 2025-06-05 PROCEDURE — 1160F RVW MEDS BY RX/DR IN RCRD: CPT | Mod: CPTII,S$GLB,, | Performed by: INTERNAL MEDICINE

## 2025-06-05 PROCEDURE — 3074F SYST BP LT 130 MM HG: CPT | Mod: CPTII,S$GLB,, | Performed by: INTERNAL MEDICINE

## 2025-06-05 PROCEDURE — 99999 PR PBB SHADOW E&M-EST. PATIENT-LVL IV: CPT | Mod: PBBFAC,,, | Performed by: INTERNAL MEDICINE

## 2025-06-05 PROCEDURE — 1159F MED LIST DOCD IN RCRD: CPT | Mod: CPTII,S$GLB,, | Performed by: INTERNAL MEDICINE

## 2025-06-05 PROCEDURE — 3008F BODY MASS INDEX DOCD: CPT | Mod: CPTII,S$GLB,, | Performed by: INTERNAL MEDICINE

## 2025-06-05 PROCEDURE — 3079F DIAST BP 80-89 MM HG: CPT | Mod: CPTII,S$GLB,, | Performed by: INTERNAL MEDICINE

## 2025-06-05 PROCEDURE — 99396 PREV VISIT EST AGE 40-64: CPT | Mod: S$GLB,,, | Performed by: INTERNAL MEDICINE

## 2025-06-19 ENCOUNTER — RESULTS FOLLOW-UP (OUTPATIENT)
Dept: PRIMARY CARE CLINIC | Facility: CLINIC | Age: 60
End: 2025-06-19

## 2025-07-02 DIAGNOSIS — E11.9 DIABETES MELLITUS WITHOUT COMPLICATION: ICD-10-CM

## 2025-07-02 DIAGNOSIS — E11.9 TYPE 2 DIABETES MELLITUS WITHOUT COMPLICATION, UNSPECIFIED WHETHER LONG TERM INSULIN USE: ICD-10-CM

## 2025-07-02 RX ORDER — SEMAGLUTIDE 0.68 MG/ML
0.5 INJECTION, SOLUTION SUBCUTANEOUS WEEKLY
Qty: 9 ML | Refills: 1 | Status: SHIPPED | OUTPATIENT
Start: 2025-07-02

## 2025-07-02 NOTE — TELEPHONE ENCOUNTER
Copied from CRM #[id number]. Topic: Medications - Pharmacy  >> Jul 2, 2025 10:08 AM Bjorn wrote:  Requesting an RX refill or new RX.    Is this a refill or new RX: New     RX name and strength (copy/paste from chart):  semaglutide (OZEMPIC) 0.25 mg or 0.5 mg (2 mg/3 mL) pen injector    Is this a 30 day or 90 day RX:     Pharmacy name and phone # (copy/paste from chart):    [place], [place] - [address] [address] [address]  Phone: [phone] Fax: [phone]      Who called and call back number:[phone]    The doctors have asked that we provide their patients with the following 2 reminders -- prescription refills can take up to 72 hours, and a friendly reminder that in the future you can use your [facility] account to request refills: n/a

## 2025-07-02 NOTE — TELEPHONE ENCOUNTER
No care due was identified.  [facility] Embedded Care Due Messages. Reference number: [id number].   7/02/2025 10:13:06 AM CDT

## 2025-08-18 ENCOUNTER — PATIENT MESSAGE (OUTPATIENT)
Dept: PRIMARY CARE CLINIC | Facility: CLINIC | Age: 60
End: 2025-08-18
Payer: COMMERCIAL

## 2025-08-18 DIAGNOSIS — E78.2 MIXED HYPERLIPIDEMIA: ICD-10-CM

## 2025-08-18 RX ORDER — ROSUVASTATIN CALCIUM 20 MG/1
TABLET, COATED ORAL
Qty: 90 TABLET | Refills: 3 | Status: SHIPPED | OUTPATIENT
Start: 2025-08-18